# Patient Record
Sex: FEMALE | Race: WHITE | Employment: OTHER | URBAN - METROPOLITAN AREA
[De-identification: names, ages, dates, MRNs, and addresses within clinical notes are randomized per-mention and may not be internally consistent; named-entity substitution may affect disease eponyms.]

---

## 2019-07-29 ENCOUNTER — HOSPITAL ENCOUNTER (EMERGENCY)
Age: 79
Discharge: HOME | End: 2019-07-29
Payer: MEDICARE

## 2019-07-29 ENCOUNTER — APPOINTMENT (OUTPATIENT)
Dept: GENERAL RADIOLOGY | Age: 79
End: 2019-07-29
Payer: MEDICARE

## 2019-07-29 VITALS
SYSTOLIC BLOOD PRESSURE: 118 MMHG | BODY MASS INDEX: 25.19 KG/M2 | RESPIRATION RATE: 16 BRPM | HEART RATE: 114 BPM | OXYGEN SATURATION: 94 % | TEMPERATURE: 98.8 F | WEIGHT: 120 LBS | HEIGHT: 58 IN | DIASTOLIC BLOOD PRESSURE: 68 MMHG

## 2019-07-29 DIAGNOSIS — S80.02XA CONTUSION OF LEFT KNEE, INITIAL ENCOUNTER: Primary | ICD-10-CM

## 2019-07-29 PROCEDURE — 99283 EMERGENCY DEPT VISIT LOW MDM: CPT

## 2019-07-29 PROCEDURE — 73564 X-RAY EXAM KNEE 4 OR MORE: CPT

## 2019-07-29 RX ORDER — FERROUS SULFATE 325(65) MG
325 TABLET ORAL
COMMUNITY

## 2019-07-29 ASSESSMENT — PAIN SCALES - GENERAL: PAINLEVEL_OUTOF10: 5

## 2019-07-29 ASSESSMENT — PAIN DESCRIPTION - ORIENTATION: ORIENTATION: LEFT

## 2019-07-29 ASSESSMENT — PAIN DESCRIPTION - LOCATION: LOCATION: KNEE

## 2019-07-29 NOTE — ED PROVIDER NOTES
[facility]    HPI:  7/29/19,   Time: 4:17 PM         Letty Jauregui is a 66 y.o. female presenting to the ED from home and complains of continued pain swelling and bruising to her left knee that began two days ago after falling. The complaint has been persistent, moderate in severity, and worsened by walking. She denies any other injury. ROS:   Pertinent positives and negatives are stated within HPI, all other systems reviewed and are negative.  --------------------------------------------- PAST HISTORY ---------------------------------------------  Past Medical History:  has a past medical history of Atrial fibrillation ([facility] [address] Past Surgical History:  has a past surgical history that includes Upper gastrointestinal endoscopy (07/25/2017). Social History:  reports that she has never smoked. She has never used smokeless tobacco. She reports that she drinks alcohol. She reports that she does not use drugs. Family History: family history is not on file. The patients home medications have been reviewed. Allergies: Dye [iodides] and Aleve [naproxen]    -------------------------------------------------- RESULTS -------------------------------------------------  All laboratory and radiology results have been personally reviewed by myself   LABS:  No results found for this visit on 07/29/19. RADIOLOGY:  Interpreted by Radiologist.  XR KNEE LEFT (MIN 4 VIEWS)   Final Result   Findings more of soft tissue contusion in the anterior aspect of the   knee extending medially and laterally. Degenerative changes seen moderate degree in the left knee joint. No acute fractures or dislocations identified.          ------------------------- NURSING NOTES AND VITALS REVIEWED ---------------------------   The nursing notes within the ED encounter and vital signs as below have been reviewed.    /68   Pulse 114 Comment: pt states her heart rate is always like this and her doctors are aware

## 2019-07-29 NOTE — ED NOTES
Ace applied to left knee. Tolerated well. States feeling better. Pulses intact after wrap placed.      True Rangel RN  07/29/19 9280

## 2020-12-15 PROBLEM — J84.10 PULMONARY FIBROSIS (HCC): Status: ACTIVE | Noted: 2020-12-15

## 2020-12-15 PROBLEM — R06.09 DYSPNEA ON EXERTION: Status: ACTIVE | Noted: 2020-12-15

## 2021-01-27 ENCOUNTER — IMMUNIZATION (OUTPATIENT)
Dept: PRIMARY CARE CLINIC | Age: 81
End: 2021-01-27
Payer: MEDICARE

## 2021-01-27 PROCEDURE — 0011A COVID-19, MODERNA VACCINE 100MCG/0.5ML DOSE: CPT | Performed by: NURSE PRACTITIONER

## 2021-01-27 PROCEDURE — 91301 COVID-19, MODERNA VACCINE 100MCG/0.5ML DOSE: CPT | Performed by: NURSE PRACTITIONER

## 2021-02-24 ENCOUNTER — IMMUNIZATION (OUTPATIENT)
Dept: PRIMARY CARE CLINIC | Age: 81
End: 2021-02-24
Payer: MEDICARE

## 2021-02-24 PROCEDURE — 91301 COVID-19, MODERNA VACCINE 100MCG/0.5ML DOSE: CPT | Performed by: NURSE PRACTITIONER

## 2021-02-24 PROCEDURE — 0012A COVID-19, MODERNA VACCINE 100MCG/0.5ML DOSE: CPT | Performed by: NURSE PRACTITIONER

## 2021-03-15 PROBLEM — J43.9 PULMONARY EMPHYSEMA (HCC): Status: ACTIVE | Noted: 2021-03-15

## 2021-06-21 ENCOUNTER — APPOINTMENT (OUTPATIENT)
Dept: GENERAL RADIOLOGY | Age: 81
DRG: 292 | End: 2021-06-21
Payer: MEDICARE

## 2021-06-21 ENCOUNTER — HOSPITAL ENCOUNTER (INPATIENT)
Age: 81
LOS: 1 days | Discharge: HOME | DRG: 292 | End: 2021-06-24
Attending: EMERGENCY MEDICINE | Admitting: FAMILY MEDICINE
Payer: MEDICARE

## 2021-06-21 DIAGNOSIS — I50.9 CONGESTIVE HEART FAILURE, UNSPECIFIED HF CHRONICITY, UNSPECIFIED HEART FAILURE TYPE (HCC): ICD-10-CM

## 2021-06-21 DIAGNOSIS — R06.00 DYSPNEA, UNSPECIFIED TYPE: ICD-10-CM

## 2021-06-21 DIAGNOSIS — R07.9 CHEST PAIN, UNSPECIFIED TYPE: Primary | ICD-10-CM

## 2021-06-21 LAB
ALBUMIN SERPL-MCNC: 4.3 G/DL (ref 3.5–5.2)
ALP BLD-CCNC: 84 U/L (ref 35–104)
ALT SERPL-CCNC: 10 U/L (ref 0–32)
ANION GAP SERPL CALCULATED.3IONS-SCNC: 9 MMOL/L (ref 7–16)
APTT: 37.6 SEC (ref 24.5–35.1)
AST SERPL-CCNC: 28 U/L (ref 0–31)
BASOPHILS ABSOLUTE: 0.03 E9/L (ref 0–0.2)
BASOPHILS RELATIVE PERCENT: 0.6 % (ref 0–2)
BILIRUB SERPL-MCNC: 0.8 MG/DL (ref 0–1.2)
BUN BLDV-MCNC: 16 MG/DL (ref 6–23)
CALCIUM SERPL-MCNC: 9.9 MG/DL (ref 8.6–10.2)
CHLORIDE BLD-SCNC: 99 MMOL/L (ref 98–107)
CO2: 29 MMOL/L (ref 22–29)
CREAT SERPL-MCNC: 1.1 MG/DL (ref 0.5–1)
EOSINOPHILS ABSOLUTE: 0.14 E9/L (ref 0.05–0.5)
EOSINOPHILS RELATIVE PERCENT: 2.7 % (ref 0–6)
GFR AFRICAN AMERICAN: 58
GFR NON-AFRICAN AMERICAN: 48 ML/MIN/1.73
GLUCOSE BLD-MCNC: 96 MG/DL (ref 74–99)
HCT VFR BLD CALC: 45.8 % (ref 34–48)
HEMOGLOBIN: 14.4 G/DL (ref 11.5–15.5)
IMMATURE GRANULOCYTES #: 0.01 E9/L
IMMATURE GRANULOCYTES %: 0.2 % (ref 0–5)
INR BLD: 1.2
LYMPHOCYTES ABSOLUTE: 1.19 E9/L (ref 1.5–4)
LYMPHOCYTES RELATIVE PERCENT: 23.2 % (ref 20–42)
MCH RBC QN AUTO: 29 PG (ref 26–35)
MCHC RBC AUTO-ENTMCNC: 31.4 % (ref 32–34.5)
MCV RBC AUTO: 92.3 FL (ref 80–99.9)
MONOCYTES ABSOLUTE: 0.42 E9/L (ref 0.1–0.95)
MONOCYTES RELATIVE PERCENT: 8.2 % (ref 2–12)
NEUTROPHILS ABSOLUTE: 3.33 E9/L (ref 1.8–7.3)
NEUTROPHILS RELATIVE PERCENT: 65.1 % (ref 43–80)
PDW BLD-RTO: 13.4 FL (ref 11.5–15)
PLATELET # BLD: 167 E9/L (ref 130–450)
PMV BLD AUTO: 10.5 FL (ref 7–12)
POTASSIUM REFLEX MAGNESIUM: 4.7 MMOL/L (ref 3.5–5)
PRO-BNP: 1945 PG/ML (ref 0–450)
PROTHROMBIN TIME: 13.4 SEC (ref 9.3–12.4)
RBC # BLD: 4.96 E12/L (ref 3.5–5.5)
SODIUM BLD-SCNC: 137 MMOL/L (ref 132–146)
TOTAL PROTEIN: 7.3 G/DL (ref 6.4–8.3)
TROPONIN, HIGH SENSITIVITY: 19 NG/L (ref 0–9)
TROPONIN, HIGH SENSITIVITY: 20 NG/L (ref 0–9)
WBC # BLD: 5.1 E9/L (ref 4.5–11.5)

## 2021-06-21 PROCEDURE — 83880 ASSAY OF NATRIURETIC PEPTIDE: CPT

## 2021-06-21 PROCEDURE — 85610 PROTHROMBIN TIME: CPT

## 2021-06-21 PROCEDURE — G0378 HOSPITAL OBSERVATION PER HR: HCPCS

## 2021-06-21 PROCEDURE — 85025 COMPLETE CBC W/AUTO DIFF WBC: CPT

## 2021-06-21 PROCEDURE — 96374 THER/PROPH/DIAG INJ IV PUSH: CPT

## 2021-06-21 PROCEDURE — 80053 COMPREHEN METABOLIC PANEL: CPT

## 2021-06-21 PROCEDURE — 96376 TX/PRO/DX INJ SAME DRUG ADON: CPT

## 2021-06-21 PROCEDURE — 71045 X-RAY EXAM CHEST 1 VIEW: CPT

## 2021-06-21 PROCEDURE — 6360000002 HC RX W HCPCS: Performed by: FAMILY MEDICINE

## 2021-06-21 PROCEDURE — 93005 ELECTROCARDIOGRAM TRACING: CPT | Performed by: NURSE PRACTITIONER

## 2021-06-21 PROCEDURE — 84484 ASSAY OF TROPONIN QUANT: CPT

## 2021-06-21 PROCEDURE — 85730 THROMBOPLASTIN TIME PARTIAL: CPT

## 2021-06-21 PROCEDURE — 99283 EMERGENCY DEPT VISIT LOW MDM: CPT

## 2021-06-21 PROCEDURE — 6360000002 HC RX W HCPCS: Performed by: EMERGENCY MEDICINE

## 2021-06-21 RX ORDER — FUROSEMIDE 10 MG/ML
20 INJECTION INTRAMUSCULAR; INTRAVENOUS ONCE
Status: COMPLETED | OUTPATIENT
Start: 2021-06-21 | End: 2021-06-21

## 2021-06-21 RX ORDER — FUROSEMIDE 10 MG/ML
40 INJECTION INTRAMUSCULAR; INTRAVENOUS DAILY
Status: DISCONTINUED | OUTPATIENT
Start: 2021-06-21 | End: 2021-06-22

## 2021-06-21 RX ORDER — ASPIRIN 81 MG/1
324 TABLET, CHEWABLE ORAL ONCE
Status: DISCONTINUED | OUTPATIENT
Start: 2021-06-21 | End: 2021-06-21

## 2021-06-21 RX ADMIN — FUROSEMIDE 40 MG: 10 INJECTION, SOLUTION INTRAMUSCULAR; INTRAVENOUS at 22:33

## 2021-06-21 RX ADMIN — FUROSEMIDE 20 MG: 10 INJECTION, SOLUTION INTRAVENOUS at 16:34

## 2021-06-21 ASSESSMENT — PAIN SCALES - GENERAL: PAINLEVEL_OUTOF10: 0

## 2021-06-21 NOTE — ED NOTES
FIRST PROVIDER CONTACT ASSESSMENT NOTE        Department of Emergency Medicine            ED  First Provider Note            6/21/21  11:18 AM EDT    Chief Complaint: No chief complaint on file. History of Present Illness:    Ally Ferraro is a [de-identified] y.o. female who presents to the emergency department of chest pain and shortness of breath. Onset of symptoms at 9 am while in physical therapy. Focused Screening Exam:  Constitutional:  Alert, appears stated age and is in no distress.     *ALLERGIES*     Dye [iodides] and Aleve [naproxen]     ED Triage Vitals [06/21/21 1114]   BP Temp Temp src Pulse Resp SpO2 Height Weight   -- 97.1 °F (36.2 °C) -- -- -- -- -- --        Initial Plan of Care:  Initiate Treatment-Testing, Proceed toTreatment Area When Bed Available for ED Attending/MLP to Continue Care    -----------------END OF FIRST PROVIDER CONTACT ASSESSMENT NOTE--------------  Electronically signed by MARKOS Santillan CNP   DD: 6/21/21     MARKOS Santillan CNP  06/21/21 1119

## 2021-06-21 NOTE — ED PROVIDER NOTES
HPI:  6/21/21,   Time: 4:01 PM EDT       Ally Ferraro is a [de-identified] y.o. female presenting to the ED for sob/nausea, beginning 6 hrs ago. The complaint has been intermittent, moderate in severity, and worsened by moderate exertion. At therayp today, cp and dominguez, better with rest.  Some nausea, no emesis. Hx dyspnea in past, but this is different. Pain pressure like. No radiation. No cough/congestion/fever/chills/sweats. Bib private vehicle    Review of Systems:   Pertinent positives and negatives are stated within HPI, all other systems reviewed and are negative.          --------------------------------------------- PAST HISTORY ---------------------------------------------  Past Medical History:  has a past medical history of Atrial fibrillation ([facility] [address] Past Surgical History:  has a past surgical history that includes Upper gastrointestinal endoscopy (07/25/2017). Social History:  reports that she quit smoking about 38 years ago. Her smoking use included cigarettes. She started smoking about 59 years ago. She has a 30.00 pack-year smoking history. She has never used smokeless tobacco. She reports current alcohol use. She reports that she does not use drugs. Family History: family history is not on file. The patients home medications have been reviewed. Allergies: Dye [iodides] and Aleve [naproxen]        ---------------------------------------------------PHYSICAL EXAM--------------------------------------    Constitutional/General: Alert and oriented x3, well appearing, non toxic in NAD  Head: Normocephalic and atraumatic  Eyes: PERRL, EOMI, conjunctive normal, sclera non icteric  Mouth: Oropharynx clear, handling secretions, no trismus, no asymmetry of the posterior oropharynx or uvular edema  Neck: Supple, full ROM, non tender to palpation in the midline, no stridor, no crepitus, no meningeal signs  Respiratory: Lungs clear to auscultation bilaterally, no wheezes, rales, or rhonchi.  Not in respiratory distress  Cardiovascular:  Regular rate. Regular rhythm. No murmurs, gallops, or rubs. 2+ distal pulses  Chest: No chest wall tenderness  GI:  Abdomen Soft, Non tender, Non distended. +BS. No organomegaly, no palpable masses,  No rebound, guarding, or rigidity. Musculoskeletal: Moves all extremities x 4. Warm and well perfused, no clubbing, cyanosis, or edema. Capillary refill <3 seconds  Integument: skin warm and dry. No rashes. Lymphatic: no lymphadenopathy noted  Neurologic: GCS 15, no focal deficits, symmetric strength 5/5 in the upper and lower extremities bilaterally  Psychiatric: Normal Affect    -------------------------------------------------- RESULTS -------------------------------------------------  I have personally reviewed all laboratory and imaging results for this patient. Results are listed below.      LABS:  Results for orders placed or performed during the hospital encounter of 06/21/21   CBC Auto Differential   Result Value Ref Range    WBC 5.1 4.5 - 11.5 E9/L    RBC 4.96 3.50 - 5.50 E12/L    Hemoglobin 14.4 11.5 - 15.5 g/dL    Hematocrit 45.8 34.0 - 48.0 %    MCV 92.3 80.0 - 99.9 fL    MCH 29.0 26.0 - 35.0 pg    MCHC 31.4 (L) 32.0 - 34.5 %    RDW 13.4 11.5 - 15.0 fL    Platelets 702 230 - 855 E9/L    MPV 10.5 7.0 - 12.0 fL    Neutrophils % 65.1 43.0 - 80.0 %    Immature Granulocytes % 0.2 0.0 - 5.0 %    Lymphocytes % 23.2 20.0 - 42.0 %    Monocytes % 8.2 2.0 - 12.0 %    Eosinophils % 2.7 0.0 - 6.0 %    Basophils % 0.6 0.0 - 2.0 %    Neutrophils Absolute 3.33 1.80 - 7.30 E9/L    Immature Granulocytes # 0.01 E9/L    Lymphocytes Absolute 1.19 (L) 1.50 - 4.00 E9/L    Monocytes Absolute 0.42 0.10 - 0.95 E9/L    Eosinophils Absolute 0.14 0.05 - 0.50 E9/L    Basophils Absolute 0.03 0.00 - 0.20 E9/L   Comprehensive Metabolic Panel w/ Reflex to MG   Result Value Ref Range    Sodium 137 132 - 146 mmol/L    Potassium reflex Magnesium 4.7 3.5 - 5.0 mmol/L    Chloride 99 98 - 107 mmol/L CO2 29 22 - 29 mmol/L    Anion Gap 9 7 - 16 mmol/L    Glucose 96 74 - 99 mg/dL    BUN 16 6 - 23 mg/dL    CREATININE 1.1 (H) 0.5 - 1.0 mg/dL    GFR Non-African American 48 >=60 mL/min/1.73    GFR African American 58     Calcium 9.9 8.6 - 10.2 mg/dL    Total Protein 7.3 6.4 - 8.3 g/dL    Albumin 4.3 3.5 - 5.2 g/dL    Total Bilirubin 0.8 0.0 - 1.2 mg/dL    Alkaline Phosphatase 84 35 - 104 U/L    ALT 10 0 - 32 U/L    AST 28 0 - 31 U/L   Troponin   Result Value Ref Range    Troponin, High Sensitivity 20 (H) 0 - 9 ng/L   Brain Natriuretic Peptide   Result Value Ref Range    Pro-BNP 1,945 (H) 0 - 450 pg/mL   APTT   Result Value Ref Range    aPTT 37.6 (H) 24.5 - 35.1 sec   Protime-INR   Result Value Ref Range    Protime 13.4 (H) 9.3 - 12.4 sec    INR 1.2    Troponin   Result Value Ref Range    Troponin, High Sensitivity 19 (H) 0 - 9 ng/L   EKG 12 Lead   Result Value Ref Range    Ventricular Rate 93 BPM    Atrial Rate 241 BPM    QRS Duration 68 ms    Q-T Interval 354 ms    QTc Calculation (Bazett) 440 ms    R Axis 111 degrees    T Axis 71 degrees       RADIOLOGY:  Interpreted by Radiologist.  XR CHEST PORTABLE   Final Result   No acute process. Stable cardiomegaly. Lobe atelectasis. EKG:  This EKG is signed and interpreted by the EP. Time: 1253  Rate: 90  Rhythm: Atrial fibrillation  Interpretation: atrial fibrillation (chronic)  Comparison: None      ------------------------- NURSING NOTES AND VITALS REVIEWED ---------------------------   The nursing notes within the ED encounter and vital signs as below have been reviewed by myself. /73   Pulse 103   Temp 97.1 °F (36.2 °C)   Resp 18   Ht 4' 10\" (1.473 m)   Wt 118 lb (53.5 kg)   SpO2 95%   BMI 24.66 kg/m²   Oxygen Saturation Interpretation: Normal    The patients available past medical records and past encounters were reviewed.         ------------------------------ ED COURSE/MEDICAL DECISION MAKING----------------------  Medications furosemide (LASIX) injection 20 mg (20 mg Intravenous Given 6/21/21 7480)         ED COURSE:       Medical Decision Making:    Vss, w/u noted, elev bnp, trop x 2 no delta, but with age and elev bnp, obs for further care. This patient's ED course included: a personal history and physicial examination    This patient has remained hemodynamically stable during their ED course. Re-Evaluations:             Re-evaluation. Patients symptoms show no change            Consultations:             Kingsley Roland    Critical Care:         Counseling: The emergency provider has spoken with the patient and discussed todays results, in addition to providing specific details for the plan of care and counseling regarding the diagnosis and prognosis. Questions are answered at this time and they are agreeable with the plan.       --------------------------------- IMPRESSION AND DISPOSITION ---------------------------------    IMPRESSION  1. Chest pain, unspecified type    2. Dyspnea, unspecified type    3. Congestive heart failure, unspecified HF chronicity, unspecified [facility] ([facility]        DISPOSITION  Disposition: Admit to telemetry  Patient condition is stable    NOTE: This report was transcribed using voice recognition software.  Every effort was made to ensure accuracy; however, inadvertent computerized transcription errors may be present        Harlan Rivera MD  06/21/21 7409

## 2021-06-22 PROBLEM — I48.91 ATRIAL FIBRILLATION WITH RAPID VENTRICULAR RESPONSE (HCC): Status: ACTIVE | Noted: 2021-06-22

## 2021-06-22 LAB
ANION GAP SERPL CALCULATED.3IONS-SCNC: 9 MMOL/L (ref 7–16)
BUN BLDV-MCNC: 18 MG/DL (ref 6–23)
CALCIUM SERPL-MCNC: 9.5 MG/DL (ref 8.6–10.2)
CHLORIDE BLD-SCNC: 99 MMOL/L (ref 98–107)
CO2: 31 MMOL/L (ref 22–29)
CREAT SERPL-MCNC: 1.2 MG/DL (ref 0.5–1)
EKG ATRIAL RATE: 241 BPM
EKG Q-T INTERVAL: 354 MS
EKG QRS DURATION: 68 MS
EKG QTC CALCULATION (BAZETT): 440 MS
EKG R AXIS: 111 DEGREES
EKG T AXIS: 71 DEGREES
EKG VENTRICULAR RATE: 93 BPM
GFR AFRICAN AMERICAN: 52
GFR NON-AFRICAN AMERICAN: 43 ML/MIN/1.73
GLUCOSE BLD-MCNC: 94 MG/DL (ref 74–99)
HCT VFR BLD CALC: 40.9 % (ref 34–48)
HEMOGLOBIN: 13 G/DL (ref 11.5–15.5)
MCH RBC QN AUTO: 29.1 PG (ref 26–35)
MCHC RBC AUTO-ENTMCNC: 31.8 % (ref 32–34.5)
MCV RBC AUTO: 91.7 FL (ref 80–99.9)
PDW BLD-RTO: 13.4 FL (ref 11.5–15)
PLATELET # BLD: 170 E9/L (ref 130–450)
PMV BLD AUTO: 10.6 FL (ref 7–12)
POTASSIUM SERPL-SCNC: 3.8 MMOL/L (ref 3.5–5)
RBC # BLD: 4.46 E12/L (ref 3.5–5.5)
SODIUM BLD-SCNC: 139 MMOL/L (ref 132–146)
WBC # BLD: 4.8 E9/L (ref 4.5–11.5)

## 2021-06-22 PROCEDURE — 85027 COMPLETE CBC AUTOMATED: CPT

## 2021-06-22 PROCEDURE — 6370000000 HC RX 637 (ALT 250 FOR IP): Performed by: FAMILY MEDICINE

## 2021-06-22 PROCEDURE — 96376 TX/PRO/DX INJ SAME DRUG ADON: CPT

## 2021-06-22 PROCEDURE — G0378 HOSPITAL OBSERVATION PER HR: HCPCS

## 2021-06-22 PROCEDURE — 36415 COLL VENOUS BLD VENIPUNCTURE: CPT

## 2021-06-22 PROCEDURE — 6360000002 HC RX W HCPCS: Performed by: FAMILY MEDICINE

## 2021-06-22 PROCEDURE — 93010 ELECTROCARDIOGRAM REPORT: CPT | Performed by: INTERNAL MEDICINE

## 2021-06-22 PROCEDURE — 80048 BASIC METABOLIC PNL TOTAL CA: CPT

## 2021-06-22 RX ORDER — LISINOPRIL 20 MG/1
40 TABLET ORAL DAILY
Status: DISCONTINUED | OUTPATIENT
Start: 2021-06-22 | End: 2021-06-22

## 2021-06-22 RX ORDER — ATORVASTATIN CALCIUM 20 MG/1
20 TABLET, FILM COATED ORAL DAILY
Status: DISCONTINUED | OUTPATIENT
Start: 2021-06-22 | End: 2021-06-25 | Stop reason: HOSPADM

## 2021-06-22 RX ORDER — DILTIAZEM HYDROCHLORIDE 120 MG/1
120 CAPSULE, COATED, EXTENDED RELEASE ORAL DAILY
Status: DISCONTINUED | OUTPATIENT
Start: 2021-06-23 | End: 2021-06-25 | Stop reason: HOSPADM

## 2021-06-22 RX ORDER — AMLODIPINE BESYLATE AND BENAZEPRIL HYDROCHLORIDE 5; 40 MG/1; MG/1
1 CAPSULE ORAL DAILY
Status: DISCONTINUED | OUTPATIENT
Start: 2021-06-22 | End: 2021-06-22 | Stop reason: CLARIF

## 2021-06-22 RX ORDER — NADOLOL 20 MG/1
5 TABLET ORAL DAILY
Status: DISCONTINUED | OUTPATIENT
Start: 2021-06-22 | End: 2021-06-25 | Stop reason: HOSPADM

## 2021-06-22 RX ORDER — ALBUTEROL SULFATE 2.5 MG/3ML
2.5 SOLUTION RESPIRATORY (INHALATION) EVERY 6 HOURS PRN
Status: DISCONTINUED | OUTPATIENT
Start: 2021-06-22 | End: 2021-06-25 | Stop reason: HOSPADM

## 2021-06-22 RX ORDER — AMLODIPINE BESYLATE 5 MG/1
5 TABLET ORAL DAILY
Status: DISCONTINUED | OUTPATIENT
Start: 2021-06-22 | End: 2021-06-22

## 2021-06-22 RX ORDER — LISINOPRIL 20 MG/1
20 TABLET ORAL DAILY
Status: DISCONTINUED | OUTPATIENT
Start: 2021-06-23 | End: 2021-06-24

## 2021-06-22 RX ORDER — POTASSIUM CHLORIDE 20 MEQ/1
20 TABLET, EXTENDED RELEASE ORAL DAILY
Status: DISCONTINUED | OUTPATIENT
Start: 2021-06-22 | End: 2021-06-25 | Stop reason: HOSPADM

## 2021-06-22 RX ORDER — ALENDRONATE SODIUM 70 MG/1
70 TABLET ORAL
Status: DISCONTINUED | OUTPATIENT
Start: 2021-06-22 | End: 2021-06-22 | Stop reason: CLARIF

## 2021-06-22 RX ADMIN — FUROSEMIDE 40 MG: 10 INJECTION, SOLUTION INTRAMUSCULAR; INTRAVENOUS at 08:14

## 2021-06-22 RX ADMIN — APIXABAN 2.5 MG: 5 TABLET, FILM COATED ORAL at 20:16

## 2021-06-22 ASSESSMENT — PAIN SCALES - GENERAL: PAINLEVEL_OUTOF10: 0

## 2021-06-22 ASSESSMENT — ENCOUNTER SYMPTOMS
SHORTNESS OF BREATH: 1
ABDOMINAL PAIN: 0

## 2021-06-22 NOTE — H&P
HISTORY AND PHYSICAL             Date: 6/22/2021        Patient Name: Bill Oneil     YOB: 1940      Age:  [de-identified] y.o. Chief Complaint     Chief Complaint   Patient presents with    Shortness of Breath     starting during physical therapy. hx afib, on eliquis        History Obtained From   patient    History of Present Illness   Patient was at PT and says she became short of breath and dizzy. Past Medical History     Past Medical History:   Diagnosis Date    Atrial fibrillation [facility])         Past Surgical History     Past Surgical History:   Procedure Laterality Date    UPPER GASTROINTESTINAL ENDOSCOPY  07/25/2017        Medications Prior to Admission     Prior to Admission medications    Medication Sig Start Date End Date Taking?  Authorizing Provider   albuterol sulfate HFA (PROVENTIL HFA) 108 (90 Base) MCG/ACT inhaler Inhale 2 puffs into the lungs every 6 hours as needed for Wheezing 12/15/20  Yes Leanne Bamberger, MD   ferrous sulfate 325 (65 Fe) MG tablet Take 325 mg by mouth daily (with breakfast)   Yes Historical Provider, MD   NADOLOL PO Take 5 mg by mouth daily    Yes Historical Provider, MD   potassium chloride (KLOR-CON M) 20 MEQ extended release tablet Take 20 mEq by mouth daily   Yes Historical Provider, MD   alendronate (FOSAMAX) 70 MG tablet Take 70 mg by mouth every 7 days Sundays   Yes Historical Provider, MD   amLODIPine-benazepril (LOTREL) 5-40 MG per capsule Take 1 capsule by mouth daily   Yes Historical Provider, MD   furosemide (LASIX) 20 MG tablet Take 20 mg by mouth daily   Yes Historical Provider, MD   simvastatin (ZOCOR) 20 MG tablet Take 20 mg by mouth nightly   Yes Historical Provider, MD   apixaban (ELIQUIS) 5 MG TABS tablet Take 2.5 mg by mouth 2 times daily    Yes Historical Provider, MD   Tiotropium Bromide-Olodaterol (STIOLTO RESPIMAT) 2.5-2.5 MCG/ACT AERS Inhale 2 actuation into the lungs daily 3/15/21   Leanne Bamberger, MD        Allergies   Dye [iodides] and Aleve [naproxen]    Social History     Social History     Tobacco History     Smoking Status  Former Smoker Smoking Start Date  12/15/1961 Quit date  12/15/1982 Smoking Frequency  1.5 packs/day for 20 years (30 pk yrs)    Smoking Tobacco Type  Cigarettes    Smokeless Tobacco Use  Never Used          Alcohol History     Alcohol Use Status  Yes Comment  SOCIALLY          Drug Use     Drug Use Status  No          Sexual Activity     Sexually Active  Not Currently                Family History   History reviewed. No pertinent family history. Review of Systems   Review of Systems   Constitutional: Positive for activity change. HENT: Negative for congestion. Respiratory: Positive for shortness of breath. Cardiovascular: Negative for chest pain. Gastrointestinal: Negative for abdominal pain. Genitourinary: Negative for difficulty urinating. Neurological: Positive for light-headedness. Negative for syncope and speech difficulty. Psychiatric/Behavioral: Negative for agitation. Physical Exam   BP 99/71   Pulse 87   Temp 97.4 °F (36.3 °C) (Temporal)   Resp 18   Ht 4' 10\" (1.473 m)   Wt 118 lb (53.5 kg)   SpO2 95%   BMI 24.66 kg/m²     Physical Exam  Vitals reviewed. HENT:      Head: Normocephalic. Mouth/Throat:      Mouth: Mucous membranes are moist.   Eyes:      Pupils: Pupils are equal, round, and reactive to light. Cardiovascular:      Rate and Rhythm: Normal rate and regular rhythm. Pulses: Normal pulses. Heart sounds: Normal heart sounds. Pulmonary:      Effort: Pulmonary effort is normal. No respiratory distress. Breath sounds: Normal breath sounds. No wheezing. Abdominal:      General: Abdomen is flat. Bowel sounds are normal. There is no distension. Tenderness: There is no abdominal tenderness. Skin:     General: Skin is warm and dry. Capillary Refill: Capillary refill takes less than 2 seconds.    Neurological:      General: No focal deficit present. Mental Status: She is alert and oriented to person, place, and time.    Psychiatric:         Mood and Affect: Mood normal.         Behavior: Behavior normal.         Labs      Recent Results (from the past 24 hour(s))   EKG 12 Lead    Collection Time: 06/21/21 12:53 PM   Result Value Ref Range    Ventricular Rate 93 BPM    Atrial Rate 241 BPM    QRS Duration 68 ms    Q-T Interval 354 ms    QTc Calculation (Bazett) 440 ms    R Axis 111 degrees    T Axis 71 degrees   CBC Auto Differential    Collection Time: 06/21/21 12:55 PM   Result Value Ref Range    WBC 5.1 4.5 - 11.5 E9/L    RBC 4.96 3.50 - 5.50 E12/L    Hemoglobin 14.4 11.5 - 15.5 g/dL    Hematocrit 45.8 34.0 - 48.0 %    MCV 92.3 80.0 - 99.9 fL    MCH 29.0 26.0 - 35.0 pg    MCHC 31.4 (L) 32.0 - 34.5 %    RDW 13.4 11.5 - 15.0 fL    Platelets 836 486 - 072 E9/L    MPV 10.5 7.0 - 12.0 fL    Neutrophils % 65.1 43.0 - 80.0 %    Immature Granulocytes % 0.2 0.0 - 5.0 %    Lymphocytes % 23.2 20.0 - 42.0 %    Monocytes % 8.2 2.0 - 12.0 %    Eosinophils % 2.7 0.0 - 6.0 %    Basophils % 0.6 0.0 - 2.0 %    Neutrophils Absolute 3.33 1.80 - 7.30 E9/L    Immature Granulocytes # 0.01 E9/L    Lymphocytes Absolute 1.19 (L) 1.50 - 4.00 E9/L    Monocytes Absolute 0.42 0.10 - 0.95 E9/L    Eosinophils Absolute 0.14 0.05 - 0.50 E9/L    Basophils Absolute 0.03 0.00 - 0.20 E9/L   Comprehensive Metabolic Panel w/ Reflex to MG    Collection Time: 06/21/21 12:55 PM   Result Value Ref Range    Sodium 137 132 - 146 mmol/L    Potassium reflex Magnesium 4.7 3.5 - 5.0 mmol/L    Chloride 99 98 - 107 mmol/L    CO2 29 22 - 29 mmol/L    Anion Gap 9 7 - 16 mmol/L    Glucose 96 74 - 99 mg/dL    BUN 16 6 - 23 mg/dL    CREATININE 1.1 (H) 0.5 - 1.0 mg/dL    GFR Non-African American 48 >=60 mL/min/1.73    GFR African American 58     Calcium 9.9 8.6 - 10.2 mg/dL    Total Protein 7.3 6.4 - 8.3 g/dL    Albumin 4.3 3.5 - 5.2 g/dL    Total Bilirubin 0.8 0.0 - 1.2 mg/dL    Alkaline Phosphatase 84 35 - 104 U/L    ALT 10 0 - 32 U/L    AST 28 0 - 31 U/L   Troponin    Collection Time: 06/21/21 12:55 PM   Result Value Ref Range    Troponin, High Sensitivity 20 (H) 0 - 9 ng/L   Brain Natriuretic Peptide    Collection Time: 06/21/21 12:55 PM   Result Value Ref Range    Pro-BNP 1,945 (H) 0 - 450 pg/mL   APTT    Collection Time: 06/21/21 12:55 PM   Result Value Ref Range    aPTT 37.6 (H) 24.5 - 35.1 sec   Protime-INR    Collection Time: 06/21/21 12:55 PM   Result Value Ref Range    Protime 13.4 (H) 9.3 - 12.4 sec    INR 1.2    Troponin    Collection Time: 06/21/21  3:56 PM   Result Value Ref Range    Troponin, High Sensitivity 19 (H) 0 - 9 ng/L   CBC    Collection Time: 06/22/21  3:50 AM   Result Value Ref Range    WBC 4.8 4.5 - 11.5 E9/L    RBC 4.46 3.50 - 5.50 E12/L    Hemoglobin 13.0 11.5 - 15.5 g/dL    Hematocrit 40.9 34.0 - 48.0 %    MCV 91.7 80.0 - 99.9 fL    MCH 29.1 26.0 - 35.0 pg    MCHC 31.8 (L) 32.0 - 34.5 %    RDW 13.4 11.5 - 15.0 fL    Platelets 992 784 - 927 E9/L    MPV 10.6 7.0 - 12.0 fL   Basic metabolic panel    Collection Time: 06/22/21  3:50 AM   Result Value Ref Range    Sodium 139 132 - 146 mmol/L    Potassium 3.8 3.5 - 5.0 mmol/L    Chloride 99 98 - 107 mmol/L    CO2 31 (H) 22 - 29 mmol/L    Anion Gap 9 7 - 16 mmol/L    Glucose 94 74 - 99 mg/dL    BUN 18 6 - 23 mg/dL    CREATININE 1.2 (H) 0.5 - 1.0 mg/dL    GFR Non-African American 43 >=60 mL/min/1.73    GFR African American 52     Calcium 9.5 8.6 - 10.2 mg/dL        Imaging/Diagnostics Last 24 Hours   XR CHEST PORTABLE    Result Date: 6/21/2021  EXAMINATION: ONE XRAY VIEW OF THE CHEST 6/21/2021 12:28 pm COMPARISON: 07/25/2017 HISTORY: ORDERING SYSTEM PROVIDED HISTORY: Shortness of breath TECHNOLOGIST PROVIDED HISTORY: Reason for exam:->Shortness of breath What reading provider will be dictating this exam?->CRC FINDINGS: The lungs are without acute focal process. Right lower lobe atelectasis. There is no effusion or pneumothorax.   Stable cardiomegaly. The osseous structures are without acute process. No acute process. Stable cardiomegaly. Lobe atelectasis. Assessment      Hospital Problems         Last Modified POA    Dyspnea 6/21/2021 Yes      Dizziness  Afib    Plan   Feels better after IV lasix, although CXR and labs are stable. Cardiology to evaluate. Continue home meds.     Consultations Ordered:  IP CONSULT TO INTERNAL MEDICINE  IP CONSULT TO CARDIOLOGY    Electronically signed by Sona Orosco MD on 6/22/21 at 7:00 AM EDT

## 2021-06-22 NOTE — PROGRESS NOTES
[facility] cardiology progress note:    Patient was seen and follows with Dr. Jagruti Abad. Nursing staff notified of needed consult change. Please call if you have any questions.    Electronically signed by MARKOS Garcia CNP on 6/22/21 at 9:43 AM EDT

## 2021-06-22 NOTE — PROGRESS NOTES
Home medications reviewed with patient upon admission. perfectserve sent to Dr. Anais Collazo at 2057 regarding orders for home medications. No orders placed at this time.

## 2021-06-22 NOTE — CARE COORDINATION
6/22/21 Transition of Care: Patient is observation due to shortness of breath. She did have iv lasix x1 and says she feels better. She is alert and oriented. She lives alone and is independent. She does not use assistive devices and she does drive. She is currently attending therapy outpatient at [address] [place] [address] due to back pain. She sees Dr James Worthy and her pharmacy is [facility]. She plans on returning home at discharge and states her family wiil provide transportion. Currently she is awaiting cardiology consult completion with Dr Clover Kumar. She is now on iv lasix qd. Creat 1.2 today. Will await cardiology and follow.  Reji Plascencia RN CM

## 2021-06-23 PROBLEM — I50.43 CHF (CONGESTIVE HEART FAILURE), NYHA CLASS I, ACUTE ON CHRONIC, COMBINED (HCC): Status: ACTIVE | Noted: 2021-06-23

## 2021-06-23 LAB
ANION GAP SERPL CALCULATED.3IONS-SCNC: 9 MMOL/L (ref 7–16)
BUN BLDV-MCNC: 26 MG/DL (ref 6–23)
CALCIUM SERPL-MCNC: 9.5 MG/DL (ref 8.6–10.2)
CHLORIDE BLD-SCNC: 100 MMOL/L (ref 98–107)
CO2: 31 MMOL/L (ref 22–29)
CREAT SERPL-MCNC: 1.4 MG/DL (ref 0.5–1)
GFR AFRICAN AMERICAN: 44
GFR NON-AFRICAN AMERICAN: 36 ML/MIN/1.73
GLUCOSE BLD-MCNC: 96 MG/DL (ref 74–99)
HCT VFR BLD CALC: 42.5 % (ref 34–48)
HEMOGLOBIN: 13.3 G/DL (ref 11.5–15.5)
MCH RBC QN AUTO: 28.7 PG (ref 26–35)
MCHC RBC AUTO-ENTMCNC: 31.3 % (ref 32–34.5)
MCV RBC AUTO: 91.6 FL (ref 80–99.9)
PDW BLD-RTO: 13.4 FL (ref 11.5–15)
PLATELET # BLD: 173 E9/L (ref 130–450)
PMV BLD AUTO: 10.8 FL (ref 7–12)
POTASSIUM SERPL-SCNC: 3.9 MMOL/L (ref 3.5–5)
RBC # BLD: 4.64 E12/L (ref 3.5–5.5)
SODIUM BLD-SCNC: 140 MMOL/L (ref 132–146)
WBC # BLD: 4.8 E9/L (ref 4.5–11.5)

## 2021-06-23 PROCEDURE — 6370000000 HC RX 637 (ALT 250 FOR IP): Performed by: INTERNAL MEDICINE

## 2021-06-23 PROCEDURE — 85027 COMPLETE CBC AUTOMATED: CPT

## 2021-06-23 PROCEDURE — 36415 COLL VENOUS BLD VENIPUNCTURE: CPT

## 2021-06-23 PROCEDURE — 80048 BASIC METABOLIC PNL TOTAL CA: CPT

## 2021-06-23 PROCEDURE — 6370000000 HC RX 637 (ALT 250 FOR IP): Performed by: FAMILY MEDICINE

## 2021-06-23 PROCEDURE — 2140000000 HC CCU INTERMEDIATE R&B

## 2021-06-23 RX ADMIN — APIXABAN 2.5 MG: 5 TABLET, FILM COATED ORAL at 09:25

## 2021-06-23 RX ADMIN — POTASSIUM CHLORIDE 20 MEQ: 1500 TABLET, EXTENDED RELEASE ORAL at 09:26

## 2021-06-23 RX ADMIN — NADOLOL 5 MG: 20 TABLET ORAL at 09:29

## 2021-06-23 RX ADMIN — APIXABAN 2.5 MG: 5 TABLET, FILM COATED ORAL at 20:47

## 2021-06-23 RX ADMIN — DILTIAZEM HYDROCHLORIDE 120 MG: 120 CAPSULE, COATED, EXTENDED RELEASE ORAL at 09:26

## 2021-06-23 RX ADMIN — ATORVASTATIN CALCIUM 20 MG: 20 TABLET, FILM COATED ORAL at 09:26

## 2021-06-23 ASSESSMENT — ENCOUNTER SYMPTOMS
SHORTNESS OF BREATH: 1
ABDOMINAL PAIN: 0

## 2021-06-23 ASSESSMENT — PAIN SCALES - GENERAL: PAINLEVEL_OUTOF10: 0

## 2021-06-23 NOTE — PROGRESS NOTES
Progress Note  Date:2021       Room:82/8209  Patient Sergey Hernandez     YOB: 1940     Age:80 y.o. Patient says her breathing is improved today. Subjective    Subjective:  Symptoms:  Improved. She reports shortness of breath. No chest pain. Diet:  Adequate intake. Activity level: Normal.    Pain:  She reports no pain. Review of Systems   Constitutional: Negative for activity change and fever. Respiratory: Positive for shortness of breath. Cardiovascular: Negative for chest pain. Gastrointestinal: Negative for abdominal pain. Genitourinary: Negative for difficulty urinating. Musculoskeletal: Negative for arthralgias. Neurological: Negative for dizziness. Psychiatric/Behavioral: Negative for agitation. Objective         Vitals Last 24 Hours:  TEMPERATURE:  Temp  Av.7 °F (36.5 °C)  Min: 97.3 °F (36.3 °C)  Max: 98.1 °F (36.7 °C)  RESPIRATIONS RANGE: Resp  Avg: 15  Min: 14  Max: 16  PULSE OXIMETRY RANGE: SpO2  Av.7 %  Min: 91 %  Max: 95 %  PULSE RANGE: Pulse  Av.5  Min: 90  Max: 91  BLOOD PRESSURE RANGE: Systolic (15CJL), DVD:171 , Min:95 , PMO:156   ; Diastolic (03JLH), BXZ:24, Min:54, Max:69    I/O (24Hr): Intake/Output Summary (Last 24 hours) at 2021 0629  Last data filed at 2021 0600  Gross per 24 hour   Intake 240 ml   Output 400 ml   Net -160 ml     Objective:  General Appearance:  Comfortable. Vital signs: (most recent): Blood pressure 95/69, pulse 90, temperature 98.1 °F (36.7 °C), temperature source Temporal, resp. rate 16, height 4' 10\" (1.473 m), weight 118 lb (53.5 kg), SpO2 95 %. No fever. Lungs:  Normal effort and normal respiratory rate. Breath sounds clear to auscultation. Heart: Normal rate. Irregular rhythm.       Labs/Imaging/Diagnostics    Labs:  CBC:  Recent Labs     21  1255 21  0350 21  0442   WBC 5.1 4.8 4.8   RBC 4.96 4.46 4.64   HGB 14.4 13.0 13.3   HCT 45.8 40.9 42.5 MCV 92.3 91.7 91.6   RDW 13.4 13.4 13.4    170 173     CHEMISTRIES:  Recent Labs     06/21/21  1255 06/22/21  0350 06/23/21  0442    139 140   K 4.7 3.8 3.9   CL 99 99 100   CO2 29 31* 31*   BUN 16 18 26*   CREATININE 1.1* 1.2* 1.4*   GLUCOSE 96 94 96     PT/INR:  Recent Labs     06/21/21  1255   PROTIME 13.4*   INR 1.2     APTT:  Recent Labs     06/21/21  1255   APTT 37.6*     LIVER PROFILE:  Recent Labs     06/21/21  1255   AST 28   ALT 10   BILITOT 0.8   ALKPHOS 84       Imaging Last 24 Hours:  XR CHEST PORTABLE    Result Date: 6/21/2021  EXAMINATION: ONE XRAY VIEW OF THE CHEST 6/21/2021 12:28 pm COMPARISON: 07/25/2017 HISTORY: ORDERING SYSTEM PROVIDED HISTORY: Shortness of breath TECHNOLOGIST PROVIDED HISTORY: Reason for exam:->Shortness of breath What reading provider will be dictating this exam?->CRC FINDINGS: The lungs are without acute focal process. Right lower lobe atelectasis. There is no effusion or pneumothorax. Stable cardiomegaly. The osseous structures are without acute process. No acute process. Stable cardiomegaly. Lobe atelectasis. Assessment//Plan           Hospital Problems         Last Modified POA    Anticoagulated 6/22/2021 Yes    Dyspnea 6/21/2021 Yes    Atrial fibrillation with rapid ventricular response ([address] 6/22/2021 Yes        Assessment:  (Dyspnea   Dizziness  Afib     ). Plan:   ([address]  Cardiology following, plan for ECHO. Continue rest of meds. Home when ok with Cardiology. ).        Electronically signed by Laura Lopez MD on 6/23/21 at 6:29 AM EDT

## 2021-06-23 NOTE — CONSULTS
CARDIOLOGY CONSULTATION    Patient Name:  Paris Patterson    :  1940    Reason for Consultation:   Near syncope; atrial fibrillation rapid ventricular response    History of Present Illness:   Paris Patterson presents to [address] following history of sudden lightheadedness while attending physical therapy yesterday morning at approximately 9 AM. She has a longstanding history of atrial fibrillation for which she was on nadolol as well as apixaban. She left the facility and drove home but at home did not feel well and was somewhat short of breath but denied any chest discomfort. She then took her dogs to a residence where she borders them and being offered to be transported to the emergency room she elected to drive herself. While in the emergency room it was noted that she had chest discomfort yet denied it this evening. She was given intravenous diuretic as her proBNP was elevated at >1900 pg/mL. She denied any peripheral edema nor sensation of abdominal fullness. She was subsequently admitted for further observation. She has been seen in the distant past by me. She has no previous history of myocardial infarction. She denies any chest discomfort nor shortness of breath this evening. Past Medical History:   has a past medical history of Atrial fibrillation ([facility] [address] Surgical History:   has a past surgical history that includes Upper gastrointestinal endoscopy (2017). Social History:   reports that she quit smoking about 38 years ago. Her smoking use included cigarettes. She started smoking about 59 years ago. She has a 30.00 pack-year smoking history. She has never used smokeless tobacco. She reports current alcohol use. She reports that she does not use drugs.      Family History:  family history is remarkable for father  in his [de-identified] secondary to heart disease and had previous heart surgery and mother  at 80 secondary to complications of wheezing, no sputum production. No hemoptysis, pleuritic pain. · Gastrointestinal: No abdominal pain, appetite loss, blood in stools. No change in bowel habits. No hematemesis  · Genitourinary: No dysuria, trouble voiding or hematuria. No nocturia or increased frequency. · Musculoskeletal:  No gait disturbance, weakness or joint complaints. · Integumentary: No rash or pruritis. · Neurological: No headache, diplopia, change in muscle strength, numbness or tingling. No change in gait, balance, coordination, mood, affect, memory, mentation, behavior. · Psychiatric: No anxiety or depression. · Endocrine: No temperature intolerance. No excessive thirst, fluid intake, or urination. No tremor. · Hematologic/Lymphatic: No abnormal bruising or bleeding, blood clots or swollen lymph nodes. · Allergic/Immunologic: No nasal congestion or hives. Physical Examination:    Vital Signs: BP 95/69   Pulse 90   Temp 98.1 °F (36.7 °C) (Temporal)   Resp 16   Ht 4' 10\" (1.473 m)   Wt 118 lb (53.5 kg)   SpO2 91%   BMI 24.66 kg/m²   General appearance: Well preserved, ectomorphic body habitus, alert, no distress. Skin: Skin color, texture, turgor normal. No rashes or lesions. No induration or tightening palpated. Head: Normocephalic. No masses, lesions, tenderness or abnormalities  Eyes: Conjunctivae/corneas clear. PERRL, EOMs intact. Sclera non icteric. Ears: External ears normal. Canals clear. TM's clear bilaterally. Hearing normal to finger rub. Nose/Sinuses: Nares normal. Septum midline. Mucosa normal. No drainage or sinus tenderness. Oropharynx: Lips, mucosa, and tongue normal. Oropharynx clear with no exudate seen. Neck: Neck supple and symmetric. No adenopathy. Thyroid symmetric, normal size, without nodules. Trachea is midline. Carotids brisk in upstroke without bruits, no abnormal JVP noted at 45°. Chest: Even excursion  Lungs: Lungs clear to auscultation bilaterally.  No retractions or use of accessory muscles. No tactile vocal fremitus. No rhonchi, crackles or rales. Heart:  S1 > S2. Irregular, irregular rhythm. No gallop or murmur. No rub, palpable thrill or heave noted. PMI 5th intercostal space midclavicular line. Abdomen: Abdomen soft, scaphoid, non-tender. BS normal. No masses, organomegaly. No hernia noted. Extremities: Extremities normal. No deformities, edema, or skin discoloration. No cyanosis or clubbing noted to the nails. Peripheral pulses present 2+ upper extremities and present 1+  lower extremities. Musculoskeletal: Spine ROM normal. Muscular strength intact. Neuro: Cranial nerves intact. Motor: Strength 5/5 in all extremities. Reflexes 2+ in all extremities. No focal weakness. Sensory: grossly normal to touch. Coordination intact. Pertinent Labs:  CBC:   Recent Labs     06/21/21  1255 06/22/21  0350   WBC 5.1 4.8   HGB 14.4 13.0    170     BMP:  Recent Labs     06/21/21  1255 06/22/21  0350    139   K 4.7 3.8   CL 99 99   CO2 29 31*   BUN 16 18   CREATININE 1.1* 1.2*   GLUCOSE 96 94   LABGLOM 48 43     ABGs: No results found for: PH, PO2, PCO2  INR:   Recent Labs     06/21/21  1255   INR 1.2     PRO-BNP:   Lab Results   Component Value Date    PROBNP 1,945 (H) 06/21/2021      Cardiac Injury Profile: No results for input(s): CKTOTAL, CKMB, CKMBINDEX, TROPONINI in the last 72 hours. Lipid Profile: No results found for: TRIG, HDL, LDLCALC, CHOL   Thyroid:   Lab Results   Component Value Date    TSH 1.870 01/22/2018      Hemoglobin A1C: No components found for: HGBA1C   ECG:  See report    Radiology:  XR CHEST PORTABLE    Result Date: 6/21/2021  EXAMINATION: ONE XRAY VIEW OF THE CHEST 6/21/2021 12:28 pm COMPARISON: 07/25/2017 HISTORY: ORDERING SYSTEM PROVIDED HISTORY: Shortness of breath TECHNOLOGIST PROVIDED HISTORY: Reason for exam:->Shortness of breath What reading provider will be dictating this exam?->CRC FINDINGS: The lungs are without acute focal process.   Right lower lobe atelectasis. There is no effusion or pneumothorax. Stable cardiomegaly. The osseous structures are without acute process. No acute process. Stable cardiomegaly. Lobe atelectasis. Assessment:    Active Problems:    Anticoagulated    Dyspnea    Atrial fibrillation with rapid ventricular response (HCC)  Resolved Problems:    * No resolved hospital problems. *      Plan: Will obtain two-dimensional echocardiogram and based upon results will further adjust her medical regimen. Will also obtain orthostatic blood pressures. I am concerned that her electrocardiogram does suggest possible remote high lateral wall infarct. Will obtain previous records from my office and make further comment in the a.m. Will also obtain lipid profile in the a.m. and make appropriate recommendations. Will withhold any further diuretic presently as certainly I suspect her elevated proBNP may simply reflect her underlying atrial fibrillation. Additionally, I have once again asked Mrs. Cheyenne Yin if she had any chest discomfort whatsoever as noted in the emergency room history and she emphatically denied it. I have spent more than 45 minutes face to face with Yair Dawson reviewing notes and laboratory data with greater than 50% of this time instructing and counseling the patient regarding my findings and recommendations and I have answered all questions as posed to me by Ms. Cheyenne Yin. Oziel Clark DO DO, FACP, [facility], [facility]    NOTE:  This report was transcribed using voice recognition software. Every effort was made to ensure accuracy; however, inadvertent computerized transcription errors may be present.

## 2021-06-23 NOTE — CARE COORDINATION
6/23/21  Update CM Note; Patient is observation and pending an echo per cardiology. Plan is to possibly adjust medication based on echo report. Echo notified and will follow for completion.  Raghavendra Mathews RN Cm

## 2021-06-24 VITALS
OXYGEN SATURATION: 96 % | HEART RATE: 75 BPM | SYSTOLIC BLOOD PRESSURE: 106 MMHG | DIASTOLIC BLOOD PRESSURE: 73 MMHG | RESPIRATION RATE: 18 BRPM | WEIGHT: 118 LBS | BODY MASS INDEX: 24.77 KG/M2 | HEIGHT: 58 IN | TEMPERATURE: 97.6 F

## 2021-06-24 PROBLEM — R55 NEAR SYNCOPE: Status: ACTIVE | Noted: 2021-06-22

## 2021-06-24 LAB
ANION GAP SERPL CALCULATED.3IONS-SCNC: 8 MMOL/L (ref 7–16)
BUN BLDV-MCNC: 21 MG/DL (ref 6–23)
CALCIUM SERPL-MCNC: 9.7 MG/DL (ref 8.6–10.2)
CHLORIDE BLD-SCNC: 98 MMOL/L (ref 98–107)
CO2: 31 MMOL/L (ref 22–29)
CREAT SERPL-MCNC: 1 MG/DL (ref 0.5–1)
GFR AFRICAN AMERICAN: >60
GFR NON-AFRICAN AMERICAN: 53 ML/MIN/1.73
GLUCOSE BLD-MCNC: 97 MG/DL (ref 74–99)
HCT VFR BLD CALC: 45.9 % (ref 34–48)
HEMOGLOBIN: 14.4 G/DL (ref 11.5–15.5)
LV EF: 55 %
LVEF MODALITY: NORMAL
MCH RBC QN AUTO: 29.3 PG (ref 26–35)
MCHC RBC AUTO-ENTMCNC: 31.4 % (ref 32–34.5)
MCV RBC AUTO: 93.3 FL (ref 80–99.9)
PDW BLD-RTO: 13.3 FL (ref 11.5–15)
PLATELET # BLD: 173 E9/L (ref 130–450)
PMV BLD AUTO: 11 FL (ref 7–12)
POTASSIUM SERPL-SCNC: 3.9 MMOL/L (ref 3.5–5)
RBC # BLD: 4.92 E12/L (ref 3.5–5.5)
SODIUM BLD-SCNC: 137 MMOL/L (ref 132–146)
WBC # BLD: 4 E9/L (ref 4.5–11.5)

## 2021-06-24 PROCEDURE — 36415 COLL VENOUS BLD VENIPUNCTURE: CPT

## 2021-06-24 PROCEDURE — 6370000000 HC RX 637 (ALT 250 FOR IP): Performed by: FAMILY MEDICINE

## 2021-06-24 PROCEDURE — 80048 BASIC METABOLIC PNL TOTAL CA: CPT

## 2021-06-24 PROCEDURE — 93306 TTE W/DOPPLER COMPLETE: CPT

## 2021-06-24 PROCEDURE — 85027 COMPLETE CBC AUTOMATED: CPT

## 2021-06-24 PROCEDURE — 6370000000 HC RX 637 (ALT 250 FOR IP): Performed by: INTERNAL MEDICINE

## 2021-06-24 RX ORDER — FUROSEMIDE 20 MG/1
20 TABLET ORAL
Qty: 60 TABLET | Refills: 3 | Status: SHIPPED | OUTPATIENT
Start: 2021-06-25

## 2021-06-24 RX ORDER — DILTIAZEM HYDROCHLORIDE 120 MG/1
120 CAPSULE, COATED, EXTENDED RELEASE ORAL DAILY
Qty: 30 CAPSULE | Refills: 3 | Status: SHIPPED | OUTPATIENT
Start: 2021-06-25

## 2021-06-24 RX ORDER — DOCUSATE SODIUM 100 MG/1
100 CAPSULE, LIQUID FILLED ORAL DAILY
Status: DISCONTINUED | OUTPATIENT
Start: 2021-06-24 | End: 2021-06-25 | Stop reason: HOSPADM

## 2021-06-24 RX ORDER — ATORVASTATIN CALCIUM 20 MG/1
20 TABLET, FILM COATED ORAL DAILY
Qty: 30 TABLET | Refills: 3 | Status: ON HOLD | OUTPATIENT
Start: 2021-06-25 | End: 2022-06-21

## 2021-06-24 RX ORDER — LISINOPRIL 10 MG/1
10 TABLET ORAL DAILY
Status: DISCONTINUED | OUTPATIENT
Start: 2021-06-24 | End: 2021-06-25 | Stop reason: HOSPADM

## 2021-06-24 RX ORDER — LISINOPRIL 10 MG/1
10 TABLET ORAL DAILY
Qty: 30 TABLET | Refills: 3 | Status: SHIPPED | OUTPATIENT
Start: 2021-06-25

## 2021-06-24 RX ADMIN — LISINOPRIL 10 MG: 10 TABLET ORAL at 08:23

## 2021-06-24 RX ADMIN — APIXABAN 2.5 MG: 5 TABLET, FILM COATED ORAL at 08:23

## 2021-06-24 RX ADMIN — APIXABAN 2.5 MG: 5 TABLET, FILM COATED ORAL at 20:37

## 2021-06-24 RX ADMIN — NADOLOL 5 MG: 20 TABLET ORAL at 08:23

## 2021-06-24 RX ADMIN — DOCUSATE SODIUM 100 MG: 100 CAPSULE, LIQUID FILLED ORAL at 13:54

## 2021-06-24 RX ADMIN — POTASSIUM CHLORIDE 20 MEQ: 1500 TABLET, EXTENDED RELEASE ORAL at 08:23

## 2021-06-24 RX ADMIN — ATORVASTATIN CALCIUM 20 MG: 20 TABLET, FILM COATED ORAL at 08:23

## 2021-06-24 RX ADMIN — DILTIAZEM HYDROCHLORIDE 120 MG: 120 CAPSULE, COATED, EXTENDED RELEASE ORAL at 08:23

## 2021-06-24 ASSESSMENT — ENCOUNTER SYMPTOMS
ABDOMINAL PAIN: 0
SHORTNESS OF BREATH: 1

## 2021-06-24 ASSESSMENT — PAIN SCALES - GENERAL: PAINLEVEL_OUTOF10: 0

## 2021-06-24 NOTE — PROGRESS NOTES
Progress Note  Date:2021       Room:82/8209  Patient Dedrick Collet     YOB: 1940     Age:80 y.o. Patient says her breathing is improved today. Subjective    Subjective:  Symptoms:  Improved. She reports shortness of breath. No chest pain. Diet:  Adequate intake. Activity level: Normal.    Pain:  She reports no pain. Review of Systems   Constitutional: Negative for activity change and fever. Respiratory: Positive for shortness of breath. Cardiovascular: Negative for chest pain. Gastrointestinal: Negative for abdominal pain. Genitourinary: Negative for difficulty urinating. Musculoskeletal: Negative for arthralgias. Neurological: Negative for dizziness. Psychiatric/Behavioral: Negative for agitation. Objective         Vitals Last 24 Hours:  TEMPERATURE:  Temp  Av °F (36.1 °C)  Min: 96.7 °F (35.9 °C)  Max: 97.3 °F (36.3 °C)  RESPIRATIONS RANGE: Resp  Avg: 15  Min: 14  Max: 16  PULSE OXIMETRY RANGE: SpO2  Av %  Min: 93 %  Max: 97 %  PULSE RANGE: Pulse  Av.7  Min: 73  Max: 81  BLOOD PRESSURE RANGE: Systolic (88UTO), WE , Min:86 , GSV:82   ; Diastolic (38KSD), ZKM:72, Min:60, Max:69    I/O (24Hr): Intake/Output Summary (Last 24 hours) at 2021 0617  Last data filed at 2021 1841  Gross per 24 hour   Intake 720 ml   Output 350 ml   Net 370 ml     Objective:  General Appearance:  Comfortable. Vital signs: (most recent): Blood pressure 91/69, pulse 81, temperature 96.7 °F (35.9 °C), temperature source Temporal, resp. rate 16, height 4' 10\" (1.473 m), weight 118 lb (53.5 kg), SpO2 93 %. No fever. Lungs:  Normal effort and normal respiratory rate. Breath sounds clear to auscultation. Heart: Normal rate. Irregular rhythm.       Labs/Imaging/Diagnostics    Labs:  CBC:  Recent Labs     21  1255 21  0350 21  0442   WBC 5.1 4.8 4.8   RBC 4.96 4.46 4.64   HGB 14.4 13.0 13.3   HCT 45.8 40.9 42.5   MCV 92.3 91.7 91.6   RDW 13.4 13.4 13.4    170 173     CHEMISTRIES:  Recent Labs     06/21/21  1255 06/22/21  0350 06/23/21  0442    139 140   K 4.7 3.8 3.9   CL 99 99 100   CO2 29 31* 31*   BUN 16 18 26*   CREATININE 1.1* 1.2* 1.4*   GLUCOSE 96 94 96     PT/INR:  Recent Labs     06/21/21  1255   PROTIME 13.4*   INR 1.2     APTT:  Recent Labs     06/21/21  1255   APTT 37.6*     LIVER PROFILE:  Recent Labs     06/21/21  1255   AST 28   ALT 10   BILITOT 0.8   ALKPHOS 84       Imaging Last 24 Hours:  XR CHEST PORTABLE    Result Date: 6/21/2021  EXAMINATION: ONE XRAY VIEW OF THE CHEST 6/21/2021 12:28 pm COMPARISON: 07/25/2017 HISTORY: ORDERING SYSTEM PROVIDED HISTORY: Shortness of breath TECHNOLOGIST PROVIDED HISTORY: Reason for exam:->Shortness of breath What reading provider will be dictating this exam?->CRC FINDINGS: The lungs are without acute focal process. Right lower lobe atelectasis. There is no effusion or pneumothorax. Stable cardiomegaly. The osseous structures are without acute process. No acute process. Stable cardiomegaly. Lobe atelectasis. Assessment//Plan           Hospital Problems         Last Modified POA    Anticoagulated 6/22/2021 Yes    Dyspnea 6/21/2021 Yes    Atrial fibrillation with rapid ventricular response ([address] 6/22/2021 Yes    CHF (congestive heart failure), NYHA class I, acute on chronic, combined ([address] 6/23/2021 Yes        Assessment:  (Dyspnea   Dizziness  Afib     ). Plan:   ([address]  Cardiology following, plan for ECHO. Reduced ACE. Continue rest of meds. Home when ok with Cardiology. ).

## 2021-06-24 NOTE — PROGRESS NOTES
BP 91/69   Pulse 81   Temp 96.7 °F (35.9 °C) (Temporal)   Resp 16   Ht 4' 10\" (1.473 m)   Wt 118 lb (53.5 kg)   SpO2 93%   BMI 24.66 kg/m²   Patient Vitals for the past 96 hrs (Last 3 readings):   Weight   06/21/21 1120 118 lb (53.5 kg)     OBJECTIVE:    HEENT: PERRL, EOM  Intact; sclera non-icteric, conjunctiva pink. Carotids are brisk in upstroke with normal contour. No carotid bruits. Normal jugular venous pulsation at 45°. No palpable cervical nor supraclavicular nodes. Thyroid not palpable. Trachea midline. Chest: Even excursion  Lungs: few bilateral mid inspiratory crackles, no expiratory wheezes or rhonchi, no decreased tactile fremitus without inspiratory rales. Heart: Irregular, irregular rhythm; S1 > S2, no gallop; grade 1 - 2/6 systolic murmur heard at the apex. No clicks, rub, palpable thrills   or heaves. PMI nondisplaced, 5th intercostal space MCL. Abdomen: Soft, nontender, nondistended,  mildly protuberant, no masses or organomegaly. Bowel sounds active. Extremities: Without clubbing, cyanosis or edema. Pulses present 3+ upper extermities bilaterally; present 1+ DP and present 1+ PT bilaterally.      Data:   Scheduled Meds: Reviewed  Continuous Infusions:     Intake/Output Summary (Last 24 hours) at 6/23/2021 2343  Last data filed at 6/23/2021 1841  Gross per 24 hour   Intake 720 ml   Output 750 ml   Net -30 ml     CBC:   Recent Labs     06/21/21  1255 06/22/21  0350 06/23/21  0442   WBC 5.1 4.8 4.8   HGB 14.4 13.0 13.3   HCT 45.8 40.9 42.5    170 173     BMP:  Recent Labs     06/21/21  1255 06/22/21  0350 06/23/21  0442    139 140   K 4.7 3.8 3.9   CL 99 99 100   CO2 29 31* 31*   BUN 16 18 26*   CREATININE 1.1* 1.2* 1.4*   LABGLOM 48 43 36     ABGs: No results found for: PH, PO2, PCO2  INR:   Recent Labs     06/21/21  1255   INR 1.2     PRO-BNP:   Lab Results   Component Value Date    PROBNP 1,945 (H) 06/21/2021      TSH:   Lab Results   Component Value Date TSH 1.870 01/22/2018      Cardiac Injury Profile: No results for input(s): CKTOTAL, CKMB, TROPONINI in the last 72 hours. Lipid Profile: No results found for: TRIG, HDL, LDLCALC, CHOL   Hemoglobin A1C: No components found for: HGBA1C     RAD:   XR CHEST PORTABLE    Result Date: 6/21/2021  EXAMINATION: ONE XRAY VIEW OF THE CHEST 6/21/2021 12:28 pm COMPARISON: 07/25/2017 HISTORY: ORDERING SYSTEM PROVIDED HISTORY: Shortness of breath TECHNOLOGIST PROVIDED HISTORY: Reason for exam:->Shortness of breath What reading provider will be dictating this exam?->CRC FINDINGS: The lungs are without acute focal process. Right lower lobe atelectasis. There is no effusion or pneumothorax. Stable cardiomegaly. The osseous structures are without acute process. No acute process. Stable cardiomegaly. Lobe atelectasis. EKG: See Report  Echo: See Report      IMPRESSIONS:  Active Problems:    Anticoagulated    Dyspnea    Atrial fibrillation with rapid ventricular response (HCC)    CHF (congestive heart failure), NYHA class I, acute on chronic, combined ([address]  Resolved Problems:    * No resolved hospital problems. *      RECOMMENDATIONS:  We will decrease dosage of ACE inhibitor and place it on hold for systolic blood pressure less than +105 mmHg. Awaiting completion of two-dimensional echocardiogram and will further adjust her medical regimen but importantly will also hopefully see her renal function stabilized. I have spent more than 25 minutes face to face with Gayathri Angel and reviewing notes and laboratory data, with greater than 50% of this time instructing and counseling the patient face to face regarding my findings and recommendations and I have answered all questions as posed to me by Ms. Tiana Runao. Adia Mota, DO FACP,FACC,FSCAI      NOTE:  This report was transcribed using voice recognition software.   Every effort was made to ensure accuracy; however, inadvertent computerized transcription errors may be present

## 2021-06-24 NOTE — PROGRESS NOTES
Physician Progress Note      PATIENT:               Triny Randall  CSN #:                  [id number]  :                       1940  ADMIT DATE:       2021 3:42 PM  [address] DATE:  RESPONDING  PROVIDER #:        Tarun Betancur MD          QUERY TEXT:    Patient admitted with dyspnea and AF Noted documentation in cardiology   consult: \"her electrocardiogram does suggest possible remote high lateral wall   infarct. \". If possible, please document in the progress notes and discharge   summary if you are evaluating and/or treating any of the following: The medical record reflects the following:  Risk Factors: age CHF AF  Clinical Indicators: \"her electrocardiogram does suggest possible remote high   lateral wall infarct. \" Troponin 20 EKG: Septal infarct (cited on or before   2017) Lateral infarct , age undetermined  Treatment: Cardiology consult EKG cycle enzymes cardiac meds    Sara Yin RN BSN CCDS  Options provided:  -- NSTEMI  -- Type 2 MI  -- Demand Ischemia with MI  -- Demand Ischemia only, no MI  -- Unstable Angina  -- Other - I will add my own diagnosis  -- Disagree - Not applicable / Not valid  -- Disagree - Clinically unable to determine / Unknown  -- Refer to Clinical Documentation Reviewer    PROVIDER RESPONSE TEXT:    This patient has unstable angina.     Query created by: Uday Bañuelos on 2021 7:23 AM      Electronically signed by:  Tarun Betancur MD 2021 11:24 AM

## 2021-06-24 NOTE — CARE COORDINATION
6/24/21 Update CM Note; Patient continues to await completion of 2D echo. Cardiology following and pending echo to further adjust medications. Currently cardiology decreased Zestril to 10mg qd. Plan remains to home with no needs.  Aakash Deutsch RN CM

## 2021-06-25 ENCOUNTER — CARE COORDINATION (OUTPATIENT)
Dept: CASE MANAGEMENT | Age: 81
End: 2021-06-25

## 2021-06-25 NOTE — PROGRESS NOTES
PROGRESS NOTE       PATIENT PROBLEM LIST:  Principal Problem:    Near syncope  Active Problems:    Anticoagulated    Dyspnea    Atrial fibrillation with rapid ventricular response (HCC)    CHF (congestive heart failure), NYHA class I, acute on chronic, combined ([address]  Resolved Problems:    * No resolved hospital problems. *      SUBJECTIVE:  Kiko Mccall states she feels much better today and has no reproducible symptoms which brought her to the emergency room. she denies any lightheadedness, palpitations, shortness of breath nor chest discomfort presently. systolic blood pressure on the other hand has been low I.e. 86/60 mmHg. REVIEW OF SYSTEMS:  General ROS: negative for - fatigue, malaise,  weight gain or weight loss  Psychological ROS: negative for - anxiety , depression  Ophthalmic ROS: negative for - decreased vision or visual distortion. ENT ROS: negative  Allergy and Immunology ROS: negative  Hematological and Lymphatic ROS: negative  Endocrine: no heat or cold intolerance and no polyphagia, polydipsia, or polyuria  Respiratory ROS: negative for - hemoptysis, pleuritic pain, shortness of breath and wheezing  Cardiovascular ROS: positive for - irregular heartbeat and loss of consciousness. Gastrointestinal ROS: no abdominal pain, change in bowel habits, or black or bloody stools  Genito-Urinary ROS: no nocturia, dysuria, trouble voiding, frequency or hematuria  Musculoskeletal ROS: negative for- myalgias, arthralgias, or claudication  Neurological ROS: no TIA or stroke symptoms otherwise no significant change in symptoms or problems since yesterday as documented in previous progress notes.     SCHEDULED MEDICATIONS:   lisinopril  10 mg Oral Daily    docusate sodium  100 mg Oral Daily    apixaban  2.5 mg Oral BID    nadolol  5 mg Oral Daily    potassium chloride  20 mEq Oral Daily    atorvastatin  20 mg Oral Daily    dilTIAZem  120 mg Oral Daily       VITAL SIGNS: /73   Pulse 75   Temp 97.6 °F (36.4 °C) (Temporal)   Resp 18   Ht 4' 10\" (1.473 m)   Wt 118 lb (53.5 kg)   SpO2 96%   BMI 24.66 kg/m²   Patient Vitals for the past 96 hrs (Last 3 readings):   Weight   06/21/21 1120 118 lb (53.5 kg)     OBJECTIVE:    HEENT: PERRL, EOM  Intact; sclera non-icteric, conjunctiva pink. Carotids are brisk in upstroke with normal contour. No carotid bruits. Normal jugular venous pulsation at 45°. No palpable cervical nor supraclavicular nodes. Thyroid not palpable. Trachea midline. Chest: Even excursion  Lungs: few bilateral mid inspiratory crackles, no expiratory wheezes or rhonchi, no decreased tactile fremitus without inspiratory rales. Heart: Irregular, irregular rhythm; S1 > S2, no gallop; grade 1  2/6 systolic murmur heard at the apex. No clicks, rub, palpable thrills   or heaves. PMI nondisplaced, 5th intercostal space MCL. Abdomen: Soft, nontender, nondistended,  mildly protuberant, no masses or organomegaly. Bowel sounds active. Extremities: Without clubbing, cyanosis or edema. Pulses present 3+ upper extermities bilaterally; present 1+ DP and present 1+ PT bilaterally. Data:   Scheduled Meds: Reviewed  Continuous Infusions:     Intake/Output Summary (Last 24 hours) at 6/24/2021 2222  Last data filed at 6/24/2021 1830  Gross per 24 hour   Intake 1200 ml   Output 300 ml   Net 900 ml     CBC:   Recent Labs     06/22/21  0350 06/23/21  0442 06/24/21  0653   WBC 4.8 4.8 4.0*   HGB 13.0 13.3 14.4   HCT 40.9 42.5 45.9    173 173     BMP:  Recent Labs     06/22/21  0350 06/23/21  0442 06/24/21  0653    140 137   K 3.8 3.9 3.9   CL 99 100 98   CO2 31* 31* 31*   BUN 18 26* 21   CREATININE 1.2* 1.4* 1.0   LABGLOM 43 36 53     ABGs: No results found for: PH, PO2, PCO2  INR:   No results for input(s): INR in the last 72 hours.   PRO-BNP:   Lab Results   Component Value Date    PROBNP 1,945 (H) 06/21/2021      TSH:   Lab Results   Component Value Date    TSH 1.870 01/22/2018      Cardiac Injury Profile: No results for input(s): CKTOTAL, CKMB, TROPONINI in the last 72 hours. Lipid Profile: No results found for: TRIG, HDL, LDLCALC, CHOL   Hemoglobin A1C: No components found for: HGBA1C     RAD:   XR CHEST PORTABLE    Result Date: 6/21/2021  EXAMINATION: ONE XRAY VIEW OF THE CHEST 6/21/2021 12:28 pm COMPARISON: 07/25/2017 HISTORY: ORDERING SYSTEM PROVIDED HISTORY: Shortness of breath TECHNOLOGIST PROVIDED HISTORY: Reason for exam:->Shortness of breath What reading provider will be dictating this exam?->CRC FINDINGS: The lungs are without acute focal process. Right lower lobe atelectasis. There is no effusion or pneumothorax. Stable cardiomegaly. The osseous structures are without acute process. No acute process. Stable cardiomegaly. Lobe atelectasis. EKG: See Report  Echo: See Report      IMPRESSIONS:  Principal Problem:    Near syncope  Active Problems:    Anticoagulated    Dyspnea    Atrial fibrillation with rapid ventricular response (HCC)    CHF (congestive heart failure), NYHA class I, acute on chronic, combined ([address]  Resolved Problems:    * No resolved hospital problems. *      RECOMMENDATIONS:  We will decrease dosage of ACE inhibitor and place it on hold for systolic blood pressure less than +105 mmHg. Awaiting completion of two-dimensional echocardiogram and will further adjust her medical regimen but importantly will also hopefully see her renal function stabilized. I have spent more than 25 minutes face to face with Mukesh Fraire and reviewing notes and laboratory data, with greater than 50% of this time instructing and counseling the patient face to face regarding my findings and recommendations and I have answered all questions as posed to me by Ms. Makenna Flores.     Yg Martin, DO FACP,FACC,FSCAI      NOTE:  This report was transcribed using voice recognition software.   Every effort was made to ensure accuracy; however, inadvertent computerized transcription errors may be present

## 2021-06-25 NOTE — CARE COORDINATION
Jamie 45 Transitions Initial Follow Up Call    Call within 2 business days of discharge: Yes    Patient: [place] Gentile Patient : 1940   MRN: [id number]  Reason for Admission: near syncope  Discharge Date: 21 RARS: Readmission Risk Score: 11      Last Discharge [facility]       Complaint Diagnosis Description Type Department Provider    21 Shortness of Breath Chest pain, unspecified type . .. ED to Hosp-Admission (Discharged) (ADMITTED) [facility] Juhi Mcintosh MD; Sue Green . ..        -First attempt to reach the patient for initial BPCI call post hospital discharge. VM box full, unable to leave message. Follow Up  No future appointments.     Lcio Green RN

## 2021-06-25 NOTE — DISCHARGE SUMMARY
Discharge Summary    Date: 6/25/2021  Patient Name: Osmany Medrano YOB: 1940 Age: [de-identified] y.o. Admit Date: 6/21/2021  Discharge Date: 6/24/2021  Discharge Condition: Stable    Admission Diagnosis  Dyspnea (R06.00);CHF (congestive heart failure), NYHA class I, acute on chronic, combined (HCC) (I50.43)     Discharge Diagnosis  Principal Problem: Near syncopeActive Problems: Anticoagulated Dyspnea Atrial fibrillation with rapid ventricular response (HCC) CHF (congestive heart failure), NYHA class I, acute on chronic, combined (HCC)Resolved Problems: * No resolved hospital problems. [facility] Stay  Narrative of Hospital Course:  Patient admitted with dyspnea from mild CHF. Improved with IV diureses. Cardiology followed. Consultants:  IP CONSULT TO INTERNAL MEDICINEIP CONSULT TO CARDIOLOGY    Surgeries/procedures Performed:       Treatments:            Discharge Plan/Disposition:  Home    Hospital/Incidental Findings Requiring Follow Up:    Patient Instructions:    Diet: Cardiac Diet    Activity:Activity as Tolerated  For number of days (if applicable): Other Instructions:    Provider Follow-Up:   No follow-ups on file.      Significant Diagnostic Studies:    Recent Labs:  Admission on 06/21/2021, Discharged on 06/24/2021Ventricular Rate                              Date: 06/21/2021Value: 93          Ref range: BPM                Status: FinalAtrial Rate                                   Date: 06/21/2021Value: 241         Ref range: BPM                Status: FinalQRS Duration                                  Date: 06/21/2021Value: 68          Ref range: ms                 Status: FinalQ-T Interval                                  Date: 06/21/2021Value: 354         Ref range: ms                 Status: FinalQTc Calculation (Bazett)                      Date: 06/21/2021Value: 440         Ref range: ms                 Status: FinalR Axis                                        Date: Date: 06/21/2021Value: 2.7         Ref range: 0.0 - 6.0 %        Status: FinalBasophils %                                   Date: 06/21/2021Value: 0.6         Ref range: 0.0 - 2.0 %        Status: FinalNeutrophils Absolute                          Date: 06/21/2021Value: 3.33        Ref range: 1.80 - 7.30 E9/L   Status: FinalImmature Granulocytes #                       Date: 06/21/2021Value: 0.01        Ref range: E9/L               Status: FinalLymphocytes Absolute                          Date: 06/21/2021Value: 1.19*       Ref range: 1.50 - 4.00 E9/L   Status: FinalMonocytes Absolute                            Date: 06/21/2021Value: 0.42        Ref range: 0.10 - 0.95 E9/L   Status: FinalEosinophils Absolute                          Date: 06/21/2021Value: 0.14        Ref range: 0.05 - 0.50 E9/L   Status: FinalBasophils Absolute                            Date: 06/21/2021Value: 0.03        Ref range: 0.00 - 0.20 E9/L   Status: FinalSodium                                        Date: 06/21/2021Value: 137         Ref range: 132 - 146 mmol/L   Status: FinalPotassium reflex Magnesium                    Date: 06/21/2021Value: 4.7         Ref range: 3.5 - 5.0 mmol/L   Status: FinalChloride                                      Date: 06/21/2021Value: 99          Ref range: 98 - 107 mmol/L    Status: FinalCO2                                           Date: 06/21/2021Value: 29          Ref range: 22 - 29 mmol/L     Status: FinalAnion Gap                                     Date: 06/21/2021Value: 9           Ref range: 7 - 16 mmol/L      Status: FinalGlucose                                       Date: 06/21/2021Value: 96          Ref range: 74 - 99 mg/dL      Status: FinalBUN                                           Date: 06/21/2021Value: 16          Ref range: 6 - 23 mg/dL       Status: FinalCREATININE                                    Date: 06/21/2021Value: 1.1*        Ref range: 0.5 - 1.0 mg/dL Status: [employer]                      Date: 06/21/2021Value: 50          Ref range: >=60 mL/min/1.73   Status: Final              Comment: Chronic Kidney Disease: less than 60 ml/min/1.73 sq.m. Kidney Failure: less than 15 ml/min/1.73 sq. m. Results valid for patients 18 years and older. GFR                           Date: 06/21/2021Value: 58            Status: FinalCalcium                                       Date: 06/21/2021Value: 9.9         Ref range: 8.6 - 10.2 mg/dL   Status: FinalTotal Protein                                 Date: 06/21/2021Value: 7.3         Ref range: 6.4 - 8.3 g/dL     Status: FinalAlbumin                                       Date: 06/21/2021Value: 4.3         Ref range: 3.5 - 5.2 g/dL     Status: FinalTotal Bilirubin                               Date: 06/21/2021Value: 0.8         Ref range: 0.0 - 1.2 mg/dL    Status: FinalAlkaline Phosphatase                          Date: 06/21/2021Value: 84          Ref range: 35 - 104 U/L       Status: FinalALT                                           Date: 06/21/2021Value: 10          Ref range: 0 - 32 U/L         Status: FinalAST                                           Date: 06/21/2021Value: 28          Ref range: 0 - 31 U/L         Status: FinalTroponin, High Sensitivity                    Date: 06/21/2021Value: 20*         Ref range: 0 - 9 ng/L         Status: Final              Comment: High Sensitivity Troponin values cannot be compared withother Troponin methodologies. Patients with high levels of Biotin oral intake (i.e. >5 mg/day)may have falsely decreased Troponin levels. Samples collectedwithin 8 hours of biotin intake may require additional informationfor diagnosis. Pro-BNP                                       Date: 06/21/2021Value: 1,945*      Ref range: 0 - 450 pg/mL      Status: FinalaPTT                                          Date: 06/21/2021Value: 37.6*       Ref range: 24.5 - 35.1 sec range: 7.0 - 12.0 fL      Status: FinalSodium                                        Date: 06/22/2021Value: 139         Ref range: 132 - 146 mmol/L   Status: FinalPotassium                                     Date: 06/22/2021Value: 3.8         Ref range: 3.5 - 5.0 mmol/L   Status: FinalChloride                                      Date: 06/22/2021Value: 99          Ref range: 98 - 107 mmol/L    Status: FinalCO2                                           Date: 06/22/2021Value: 31*         Ref range: 22 - 29 mmol/L     Status: FinalAnion Gap                                     Date: 06/22/2021Value: 9           Ref range: 7 - 16 mmol/L      Status: FinalGlucose                                       Date: 06/22/2021Value: 94          Ref range: 74 - 99 mg/dL      Status: FinalBUN                                           Date: 06/22/2021Value: 18          Ref range: 6 - 23 mg/dL       Status: FinalCREATININE                                    Date: 06/22/2021Value: 1.2*        Ref range: 0.5 - 1.0 mg/dL    Status: FinalGFR Non-                      Date: 06/22/2021Value: 43          Ref range: >=60 mL/min/1.73   Status: Final              Comment: Chronic Kidney Disease: less than 60 ml/min/1.73 sq.m. Kidney Failure: less than 15 ml/min/1.73 sq. m. Results valid for patients 18 years and older. GFR                           Date: 06/22/2021Value: 52            Status: FinalCalcium                                       Date: 06/22/2021Value: 9.5         Ref range: 8.6 - 10.2 mg/dL   Status: [address] [facility]                                           Date: 06/23/2021Value: 4.8         Ref range: 4.5 - 11.5 E9/L    Status: FinalRBC                                           Date: 06/23/2021Value: 4.64        Ref range: 3.50 - 5.50 E12/L  Status: FinalHemoglobin                                    Date: 06/23/2021Value: 13.3        Ref range: 11.5 - 15.5 g/dL   Status: FinalHematocrit Date: 06/23/2021Value: 42.5        Ref range: 34.0 - 48.0 %      Status: FinalMCV                                           Date: 06/23/2021Value: 91.6        Ref range: 80.0 - 99.9 fL     Status: [facility]                                           Date: 06/23/2021Value: 28.7        Ref range: 26.0 - 35.0 pg     Status: [address]                                          Date: 06/23/2021Value: 31.3*       Ref range: 32.0 - 34.5 %      Status: FinalRDW                                           Date: 06/23/2021Value: 13.4        Ref range: 11.5 - 15.0 fL     Status: FinalPlatelets                                     Date: 06/23/2021Value: 173         Ref range: 130 - 450 E9/L     Status: FinalMPV                                           Date: 06/23/2021Value: 10.8        Ref range: 7.0 - 12.0 fL      Status: FinalSodium                                        Date: 06/23/2021Value: 140         Ref range: 132 - 146 mmol/L   Status: FinalPotassium                                     Date: 06/23/2021Value: 3.9         Ref range: 3.5 - 5.0 mmol/L   Status: FinalChloride                                      Date: 06/23/2021Value: 100         Ref range: 98 - 107 mmol/L    Status: FinalCO2                                           Date: 06/23/2021Value: 31*         Ref range: 22 - 29 mmol/L     Status: FinalAnion Gap                                     Date: 06/23/2021Value: 9           Ref range: 7 - 16 mmol/L      Status: FinalGlucose                                       Date: 06/23/2021Value: 96          Ref range: 74 - 99 mg/dL      Status: FinalBUN                                           Date: 06/23/2021Value: 26*         Ref range: 6 - 23 mg/dL       Status: FinalCREATININE                                    Date: 06/23/2021Value: 1.4*        Ref range: 0.5 - 1.0 mg/dL    Status: FinalGFR Non-                      Date: 06/23/2021Value: 36          Ref range: >=60 mL/min/1.73 Status: Final              Comment: Chronic Kidney Disease: less than 60 ml/min/1.73 sq.m. Kidney Failure: less than 15 ml/min/1.73 sq. m. Results valid for patients 18 years and older. GFR                           Date: 06/23/2021Value: 44            Status: FinalCalcium                                       Date: 06/23/2021Value: 9.5         Ref range: 8.6 - 10.2 mg/dL   Status: [address] [facility]                                           Date: 06/24/2021Value: 4.0*        Ref range: 4.5 - 11.5 E9/L    Status: FinalRBC                                           Date: 06/24/2021Value: 4.92        Ref range: 3.50 - 5.50 E12/L  Status: FinalHemoglobin                                    Date: 06/24/2021Value: 14.4        Ref range: 11.5 - 15.5 g/dL   Status: FinalHematocrit                                    Date: 06/24/2021Value: 45.9        Ref range: 34.0 - 48.0 %      Status: FinalMCV                                           Date: 06/24/2021Value: 93.3        Ref range: 80.0 - 99.9 fL     Status: [address]                                           Date: 06/24/2021Value: 29.3        Ref range: 26.0 - 35.0 pg     Status: [facility]                                          Date: 06/24/2021Value: 31.4*       Ref range: 32.0 - 34.5 %      Status: FinalRDW                                           Date: 06/24/2021Value: 13.3        Ref range: 11.5 - 15.0 fL     Status: FinalPlatelets                                     Date: 06/24/2021Value: 173         Ref range: 130 - 450 E9/L     Status: FinalMPV                                           Date: 06/24/2021Value: 11.0        Ref range: 7.0 - 12.0 fL      Status: FinalSodium                                        Date: 06/24/2021Value: 137         Ref range: 132 - 146 mmol/L   Status: FinalPotassium                                     Date: 06/24/2021Value: 3.9         Ref range: 3.5 - 5.0 mmol/L   Status: FinalChloride                                      Date: 06/24/2021Value: 98          Ref range: 98 - 107 mmol/L    Status: FinalCO2                                           Date: 06/24/2021Value: 31*         Ref range: 22 - 29 mmol/L     Status: FinalAnion Gap                                     Date: 06/24/2021Value: 8           Ref range: 7 - 16 mmol/L      Status: FinalGlucose                                       Date: 06/24/2021Value: 97          Ref range: 74 - 99 mg/dL      Status: FinalBUN                                           Date: 06/24/2021Value: 21          Ref range: 6 - 23 mg/dL       Status: FinalCREATININE                                    Date: 06/24/2021Value: 1.0         Ref range: 0.5 - 1.0 mg/dL    Status: FinalGFR Non-                      Date: 06/24/2021Value: 53          Ref range: >=60 mL/min/1.73   Status: Final              Comment: Chronic Kidney Disease: less than 60 ml/min/1.73 sq.m. Kidney Failure: less than 15 ml/min/1.73 sq. m. Results valid for patients 18 years and older. GFR                           Date: 06/24/2021Value: >60           Status: FinalCalcium                                       Date: 06/24/2021Value: 9.7         Ref range: 8.6 - 10.2 mg/dL   Status: Final------------    Radiology last 7 days:  XR CHEST PORTABLEResult Date: 6/21/2021No acute process. Stable cardiomegaly. Lobe atelectasis.       [unfilled]    Discharge Medications    Discharge Medication List as of 6/24/2021 10:24 PMSTART taking these medicationsatorvastatin (LIPITOR) 20 MG tabletTake 1 tablet by mouth daily, Disp-30 tablet, R-3Normallisinopril (PRINIVIL;ZESTRIL) 10 MG tabletTake 1 tablet by mouth daily, Disp-30 tablet, R-3NormaldilTIAZem (CARDIZEM CD) 120 MG extended release capsuleTake 1 capsule by mouth daily, Disp-30 capsule, R-3Normal    Discharge Medication List as of 6/24/2021 10:24 PMCONTINUE these medications which have CHANGEDfurosemide (LASIX) 20 MG tabletTake 1 tablet by mouth three times a week Mon-Weds-Sat only, Disp-60 tablet, R-3Normal    Discharge Medication List as of 6/24/2021 10:24 Frank Barters these medications which have NOT CHANGEDalbuterol sulfate HFA (PROVENTIL HFA) 108 (90 Base) MCG/ACT inhalerInhale 2 puffs into the lungs every 6 hours as needed for Wheezing, Disp-1 Inhaler, R-3Normalferrous sulfate 325 (65 Fe) MG tabletTake 325 mg by mouth daily (with breakfast)Historical MedNADOLOL POTake 5 mg by mouth daily Historical Medpotassium chloride (KLOR-CON M) 20 MEQ extended release tabletTake 20 mEq by mouth dailyHistorical Medalendronate (FOSAMAX) 70 MG tabletTake 70 mg by mouth every 7 days SundaysHistorical Medapixaban (ELIQUIS) 5 MG TABS tabletTake 2.5 mg by mouth 2 times daily Historical MedTiotropium Bromide-Olodaterol (STIOLTO RESPIMAT) 2.5-2.5 MCG/ACT AERSInhale 2 actuation into the lungs daily, Disp-1 Inhaler, R-0Sample    Discharge Medication List as of 6/24/2021 10:24 PMSTOP taking these medicationspantoprazole (PROTONIX) 40 MG tabletComments:Reason for Stopping:sucralfate (CARAFATE) 1 GM/10ML suspensionComments:Reason for Stopping:amLODIPine-benazepril (LOTREL) 5-40 MG per capsuleComments:Reason for Stopping:simvastatin (ZOCOR) 20 MG tabletComments:Reason for Stopping:    Time Spent on Discharge:1E] minutes were spent in patient examination, evaluation, counseling as well as medication reconciliation, prescriptions for required medications, discharge plan, and follow up.     Electronically signed by Abdon Solis MD on 6/25/21 at 6:25 AM EDT

## 2021-06-28 ENCOUNTER — CARE COORDINATION (OUTPATIENT)
Dept: CASE MANAGEMENT | Age: 81
End: 2021-06-28

## 2021-06-28 NOTE — CARE COORDINATION
Jaime 45 Transitions Initial Follow Up Call    Call within 2 business days of discharge: Yes    Patient: Bull Smith Patient : 1940   MRN: [id number]  Reason for Admission: near syncope  Discharge Date: 21 RARS: Readmission Risk Score: 11      Last Discharge [facility]       Complaint Diagnosis Description Type Department Provider    21 Shortness of Breath Chest pain, unspecified type . .. ED to Hosp-Admission (Discharged) (ADMITTED) [facility] 8S CDU Kayleen Hashimoto, MD; Lisbeth Watts . ..        -Second attempt to reach the patient for initial BPCI call post hospital discharge. \"Memory full\" as per phone automation, unable to leave message. Follow Up  No future appointments.     Ezra Garcia RN

## 2021-07-06 ENCOUNTER — CARE COORDINATION (OUTPATIENT)
Dept: CASE MANAGEMENT | Age: 81
End: 2021-07-06

## 2021-07-06 NOTE — CARE COORDINATION
Jaime 45 Transitions Follow Up Call    2021    Patient: Juanita Lechuga  Patient : 1940   MRN: [id number]  Reason for Admission:   Discharge Date: 21 RARS: Readmission Risk Score: 11    Attempted to reach patient by phone regarding follow up; BPCI-A. Unable to reach patient; no answer, voice mailbox full. Attempted to reach office of Dr. Darby Cabrera to confirm patient scheduled f/u appointment; message states not taking calls at this time due to high call volume.       Yuliya Olivo RN

## 2021-07-12 ENCOUNTER — CARE COORDINATION (OUTPATIENT)
Dept: CASE MANAGEMENT | Age: 81
End: 2021-07-12

## 2021-07-12 NOTE — CARE COORDINATION
Jaime 45 Transitions Follow Up Call    2021    Patient: Juanita Lechuga  Patient : 1940   MRN: [id number]  Reason for Admission: Chest pain  Discharge Date: 21 RARS: Readmission Risk Score: 11       Attempted to contact patient for transitions call. Memory full, unable to leave message. Attempted PCP office to check on HFU appt. Office closed. Follow Up  No future appointments.     Janeen Garay RN

## 2021-07-15 ENCOUNTER — CARE COORDINATION (OUTPATIENT)
Dept: CASE MANAGEMENT | Age: 81
End: 2021-07-15

## 2021-07-15 NOTE — CARE COORDINATION
Jaime 45 Transitions Follow Up Call    7/15/2021    Patient: Kiko Mccall  Patient : 1940   MRN: [id number]  Reason for Admission: Chest pain  Discharge Date: 21 RARS: Readmission Risk Score: 11         Attempted to contact patient for BPCI-A call. Memory full, unable to leave message. Follow Up  No future appointments.     Meghan Vo RN

## 2021-07-23 ENCOUNTER — CARE COORDINATION (OUTPATIENT)
Dept: CASE MANAGEMENT | Age: 81
End: 2021-07-23

## 2021-07-23 NOTE — CARE COORDINATION
Jaime 45 Transitions Initial Follow Up Call    Call within 2 business days of discharge:     Patient: Mukesh Fraire Patient : 1940   MRN: [id number]  Reason for Admission:   Discharge Date: 21 RARS: Readmission Risk Score: 11      Last Discharge [facility]       Complaint Diagnosis Description Type Department Provider    21 Shortness of Breath Chest pain, unspecified type . .. ED to Hosp-Admission (Discharged) (ADMITTED) [facility] 8S CDU Alix Aggarwal MD; Josemanuel Abreu . .. Spoke with: Mukesh Fraire, patient    Facility:  [facility]     Contacted patient for initial BPCI-A follow up. Spoke with Angelneftaly Sandympf who was very upset that she has been receiving calls everyday from an affiliate of [facility] ([facility]). Patient stated she has not been answering the phone so that is probably the reason they are calling her everyday. She said she wants the calls to stop. CTN apologized and informed her that she would reach out to her director. CTN introduced self and provided information about the Medicare bundle program.  Angelarqian Roque stated she is okay with follow up calls but does NOT want calls everyday or multiple times a week. Informed her that we typically follow up once a week for the first 30 days and then push the calls out if she is no longer having any issues or signs/symptoms. Patient verbalized understanding and is in agreement with this. Marcioyury Sandympf stated she is feeling much better. Stated she is no longer short of breath and able to do more w/o becoming short of breath. She is trying not to overexert herself. No c/o chest pain/discomfort, palpitations that she is aware of, swelling or weight gain. Denies any syncopal episodes. Reports she saw her cardiologist and medications have been changed. Attempted to review medications but patient declined. She stated she does not have them with her right now.   Chantel Ades for being short of CTN and

## 2021-07-30 ENCOUNTER — CARE COORDINATION (OUTPATIENT)
Dept: CASE MANAGEMENT | Age: 81
End: 2021-07-30

## 2021-07-30 NOTE — CARE COORDINATION
Jaime 45 Transitions Follow Up Call    2021    Patient: Ezra Castro  Patient : 1940   MRN: [id number]  Reason for Admission:   Discharge Date: 21 RARS: Readmission Risk Score: 11    Attempted to contact patient for BPCI-A follow up. Unable to reach patient. Recording stated to please call again. No option to leave a message. Will try again at a later time. Follow Up  No future appointments.     Hernandez Lee RN

## 2021-08-09 ENCOUNTER — CARE COORDINATION (OUTPATIENT)
Dept: CASE MANAGEMENT | Age: 81
End: 2021-08-09

## 2021-08-09 NOTE — CARE COORDINATION
Jaime 45 Transitions Follow Up Call    2021    Patient: Star Luu  Patient : 1940   MRN: <[id number]>  Reason for Admission:   Discharge Date: 21 RARS: Readmission Risk Score: 11    Attempted to contact patient for follow up BPCI-A transition call. Unable to leave a voicemail message to return call. Message received, called party is temporarily unavailable. Will continue to follow. Care Transitions Subsequent and Final Call    Subsequent and Final Calls  Care Transitions Interventions  Other Interventions: Follow Up  No future appointments.     Stone Vizcaino LPN

## 2021-08-19 ENCOUNTER — CARE COORDINATION (OUTPATIENT)
Dept: CASE MANAGEMENT | Age: 81
End: 2021-08-19

## 2021-08-19 NOTE — CARE COORDINATION
Jaime 45 Transitions Follow Up Call    2021    Patient: Lucas Terry  Patient : 1940   MRN: <[id number]>  Reason for Admission:   Discharge Date: 21 RARS: Readmission Risk Score: 11       Attempted to contact patient for follow up BPCI-A transition call. Unable to leave a voicemail message to return call. Message received states not available now. Will continue to follow. Care Transitions Subsequent and Final Call    Subsequent and Final Calls  Care Transitions Interventions  Other Interventions: Follow Up  No future appointments.     Amaury Morrissey LPN

## 2021-08-30 ENCOUNTER — CARE COORDINATION (OUTPATIENT)
Dept: CASE MANAGEMENT | Age: 81
End: 2021-08-30

## 2021-08-30 NOTE — CARE COORDINATION
Jaime 45 Transitions Follow Up Call    2021    Patient: Scar Abbott  Patient : 1940   MRN: <[id number]>  Reason for Admission:   Discharge Date: 21 RARS: Readmission Risk Score: 11       Attempted to contact patient for follow up BPCI-A transition call. Unable to leave a voicemail message to return call. Voicemail full. Will continue to follow. Care Transitions Subsequent and Final Call    Subsequent and Final Calls  Care Transitions Interventions  Other Interventions: Follow Up  No future appointments.     Lora Fox LPN

## 2021-09-10 ENCOUNTER — CARE COORDINATION (OUTPATIENT)
Dept: CASE MANAGEMENT | Age: 81
End: 2021-09-10

## 2021-09-21 ENCOUNTER — CARE COORDINATION (OUTPATIENT)
Dept: CASE MANAGEMENT | Age: 81
End: 2021-09-21

## 2021-09-21 NOTE — CARE COORDINATION
Jaime 45 Transitions Follow Up Call    2021    Patient: Ezra Castro  Patient : 1940   MRN: [id number]  Reason for Admission:   Discharge Date: 21 RARS: Readmission Risk Score: 11    Attempted to contact patient for final BPCI-A follow up. Unable to reach patient. Left message with contact information and request for call back. BPCI-A bundle ending. CTN signing off. Follow Up  No future appointments.     Hernandez Lee RN

## 2021-09-24 ENCOUNTER — TELEPHONE (OUTPATIENT)
Dept: PRIMARY CARE CLINIC | Age: 81
End: 2021-09-24

## 2021-10-02 ENCOUNTER — HOSPITAL ENCOUNTER (EMERGENCY)
Age: 81
Discharge: HOME | End: 2021-10-02
Attending: EMERGENCY MEDICINE
Payer: MEDICARE

## 2021-10-02 VITALS
RESPIRATION RATE: 16 BRPM | HEART RATE: 89 BPM | TEMPERATURE: 98.2 F | OXYGEN SATURATION: 94 % | DIASTOLIC BLOOD PRESSURE: 78 MMHG | SYSTOLIC BLOOD PRESSURE: 124 MMHG

## 2021-10-02 DIAGNOSIS — T50.901A ACCIDENTAL MEDICATION ERROR, INITIAL ENCOUNTER: Primary | ICD-10-CM

## 2021-10-02 PROCEDURE — 93005 ELECTROCARDIOGRAM TRACING: CPT | Performed by: EMERGENCY MEDICINE

## 2021-10-02 PROCEDURE — 99283 EMERGENCY DEPT VISIT LOW MDM: CPT

## 2021-10-02 NOTE — ED PROVIDER NOTES
EXAM--------------------------------------    Constitutional/General: Alert and oriented x3  Head: Normocephalic and atraumatic  Eyes: PERRL, EOMI, sclera non icteric  Mouth: Oropharynx clear, handling secretions, no trismus, no asymmetry of the posterior oropharynx or uvular edema  Neck: Supple, full ROM, no stridor, no meningeal signs  Respiratory: Lungs clear to auscultation bilaterally, no wheezes, rales, or rhonchi. Not in respiratory distress  Cardiovascular:  Regular rate. Regular rhythm. No murmurs, no aortic murmurs, no gallops, or rubs. 2+ distal pulses. Equal extremity pulses. Chest: No chest wall tenderness  GI:  Abdomen Soft, Non tender, Non distended. No rebound, guarding, or rigidity. No pulsatile masses. Musculoskeletal: Moves all extremities x 4. Warm and well perfused, no clubbing, cyanosis, or edema. Capillary refill <3 seconds  Integument: skin warm and dry. No rashes. Neurologic: GCS 15, no focal deficits, symmetric strength 5/5 in the upper and lower extremities bilaterally  Psychiatric: Normal Affect          -------------------------------------------------- RESULTS -------------------------------------------------  I have personally reviewed all laboratory and imaging results for this patient. Results are listed below.      LABS: (Lab results interpreted by me)  Results for orders placed or performed during the hospital encounter of 10/02/21   EKG 12 Lead   Result Value Ref Range    Ventricular Rate 88 BPM    Atrial Rate 300 BPM    QRS Duration 58 ms    Q-T Interval 344 ms    QTc Calculation (Bazett) 416 ms    R Axis 138 degrees    T Axis 43 degrees   ,       RADIOLOGY:  Interpreted by Radiologist unless otherwise specified  No orders to display         EKG Interpretation  Interpreted by emergency department physician, Dr. Meg Awan    Date of EKG: 10/2/21  Time: 1922    Rhythm: atrial fibrillation - controlled  Rate: 88  Axis: right  Conduction: normal  ST Segments: no acute change  T Waves: no acute change    Clinical Impression: No findings suggestive of acute ischemia or injury  Comparison to prior EKG: stable as compared to patient's most recent EKG      ------------------------- NURSING NOTES AND VITALS REVIEWED ---------------------------   The nursing notes within the ED encounter and vital signs as below have been reviewed by myself  /78   Pulse 89   Temp 98.2 °F (36.8 °C) (Temporal)   Resp 16   SpO2 94%     Oxygen Saturation Interpretation: Normal    The patients available past medical records and past encounters were reviewed. ------------------------------ ED COURSE/MEDICAL DECISION MAKING----------------------  Medications - No data to display            Medical Decision Making:     I, Dr. Mukesh Zhou, am the primary provider of record    80-year-old female presenting after taking a full dose of her 20 mg tablet of nadolol. She is having no symptoms. She is normotensive and EKG shows no ischemia or injury, no bradycardia. Patient has ambulated to the bathroom multiple times with no symptoms, no complaints, no difficulty. Repeat blood pressure is unchanged. She was observed in ED for over 3 hours with no changes, comfortable at discharge home and PCP follow-up. Encouraged to return for any changes in condition or new symptoms. Re-Evaluations: This patient's ED course included: a personal history and physicial examination and re-evaluation prior to disposition    This patient has remained hemodynamically stable and been closely monitored during their ED course. Counseling: The emergency provider has spoken with the patient and discussed todays results, in addition to providing specific details for the plan of care and counseling regarding the diagnosis and prognosis.   Questions are answered at this time and they are agreeable with the plan.       --------------------------------- IMPRESSION AND DISPOSITION ---------------------------------    IMPRESSION  1. Accidental medication error, initial encounter        DISPOSITION  Disposition: Discharge to home  Patient condition is stable        NOTE: This report was transcribed using voice recognition software.  Every effort was made to ensure accuracy; however, inadvertent computerized transcription errors may be present        Awa Logan DO  10/02/21 [id number]

## 2021-10-03 LAB
EKG ATRIAL RATE: 300 BPM
EKG Q-T INTERVAL: 344 MS
EKG QRS DURATION: 58 MS
EKG QTC CALCULATION (BAZETT): 416 MS
EKG R AXIS: 138 DEGREES
EKG T AXIS: 43 DEGREES
EKG VENTRICULAR RATE: 88 BPM

## 2021-10-03 PROCEDURE — 93010 ELECTROCARDIOGRAM REPORT: CPT | Performed by: INTERNAL MEDICINE

## 2022-03-31 ENCOUNTER — OFFICE VISIT (OUTPATIENT)
Dept: FAMILY MEDICINE CLINIC | Age: 82
End: 2022-03-31
Payer: MEDICARE

## 2022-03-31 VITALS
BODY MASS INDEX: 23.72 KG/M2 | WEIGHT: 113 LBS | HEIGHT: 58 IN | OXYGEN SATURATION: 97 % | TEMPERATURE: 97.8 F | RESPIRATION RATE: 16 BRPM | DIASTOLIC BLOOD PRESSURE: 78 MMHG | HEART RATE: 55 BPM | SYSTOLIC BLOOD PRESSURE: 126 MMHG

## 2022-03-31 DIAGNOSIS — S81.812A LEG LACERATION, LEFT, INITIAL ENCOUNTER: Primary | ICD-10-CM

## 2022-03-31 PROCEDURE — 99213 OFFICE O/P EST LOW 20 MIN: CPT

## 2022-03-31 RX ORDER — SULFAMETHOXAZOLE AND TRIMETHOPRIM 800; 160 MG/1; MG/1
1 TABLET ORAL 2 TIMES DAILY
Qty: 20 TABLET | Refills: 0 | Status: SHIPPED | OUTPATIENT
Start: 2022-03-31 | End: 2022-04-10

## 2022-03-31 NOTE — PROGRESS NOTES
Chief Complaint       Wound Check (lt lower extremity since yesterday  / dog jumped off lap and accidentally scraped leg )    History of Present Illness   Source of history provided by:  patient. Christina Gil is a 80 y.o. old female presenting to the walk in clinic for a laceration to the left lower leg, caused by dog jumping off of her lap and scraping her leg, which occurred yesterday afternoon. Patient immediately rinsed area with hydrogen peroxide and soapy water. Pt states bleeding is controlled and there is no N/T to the site. The patients tetanus status is up to date (2018). Pt denies any significant pain at the site, loss of function to the area, fever, chills, lethargy, N/V, or syncope. Patient on Eliquis. ROS    Unless otherwise stated in this report or unable to obtain because of the patient's clinical or mental status as evidenced by the medical record, this patients's positive and negative responses for Review of Systems, constitutional, psych, eyes, ENT, cardiovascular, respiratory, gastrointestinal, neurological, genitourinary, musculoskeletal, integument systems and systems related to the presenting problem are either stated in the preceding or were not pertinent or were negative for the symptoms and/or complaints related to the medical problem. Physical Exam         VS:  /78   Pulse 55   Temp 97.8 °F (36.6 °C) (Temporal)   Resp 16   Ht 4' 10\" (1.473 m)   Wt 113 lb (51.3 kg)   SpO2 97%   BMI 23.62 kg/m²    Oxygen Saturation Interpretation: Normal.    Constitutional:  Alert, development consistent with age. Chest: Heart RRR without pathologic murmurs or gallops. Lungs CTAB without W/R/R. Skin: 6cm laceration to the medial left shin noted. Skin macerated in this area. Multiple small puncture wounds noted throughout lower left leg, healing appropriately. No surrounding erythema, warmth, or lymphatic streaking. No current active bleeding.  Wound explored extensively and no FB present. No tendon laceration noted. FROM without deficits or weakness. Distal sensation intact. Cap refill less then 2 sec. Lymphatics: No lymphangitis or adenopathy noted. Neurological:  Alert and oriented. Motor functions intact. Lab / Imaging Results   (All laboratory and radiology results have been personally reviewed by myself)  Labs:  No results found for this visit on 03/31/22. Imaging: All Radiology results interpreted by Radiologist unless otherwise noted. Assessment / Plan     Impression(s):  Jalil Saldana was seen today for wound check. Diagnoses and all orders for this visit:    Leg laceration, left, initial encounter  -     sulfamethoxazole-trimethoprim (BACTRIM DS;SEPTRA DS) 800-160 MG per tablet; Take 1 tablet by mouth 2 times daily for 10 days  -     mupirocin (BACTROBAN) 2 % ointment; Apply topically 3 times daily. Disposition:  Disposition: Discharge to home. Laceration was not repaired due to cause and duration of injury. Laceration healing appropriately and no active bleeding noted. Wound was irrigated with normal saline and cleansed with iodine swabs. Bacitracin ointment applied along with sterile dressing. Patient was provided materials to dress wound at home. Script for Bactrim and mupriocin provided. Advised rest of the affected area to prevent dehiscence. Wound care measures discussed at length. Advised to follow up with PCP next week if no improvement. ED sooner if symptoms worsen or change. ED immediately with signs of secondary infection including surrounding erythema, swelling, increased tenderness, or purulent discharge. ED with any weakness, paresthesias, or uncontrolled bleeding. Pt states understanding and is in agreement with this care plan. All questions answered. HAILEY You    **This report was transcribed using voice recognition software.  Every effort was made to ensure accuracy; however, inadvertent computerized transcription errors may be present.

## 2022-04-11 DIAGNOSIS — S81.812A LEG LACERATION, LEFT, INITIAL ENCOUNTER: ICD-10-CM

## 2022-06-20 ENCOUNTER — OFFICE VISIT (OUTPATIENT)
Dept: FAMILY MEDICINE CLINIC | Age: 82
End: 2022-06-20
Payer: MEDICARE

## 2022-06-20 ENCOUNTER — HOSPITAL ENCOUNTER (OUTPATIENT)
Age: 82
Setting detail: OBSERVATION
Discharge: HOME | End: 2022-06-22
Attending: EMERGENCY MEDICINE | Admitting: FAMILY MEDICINE
Payer: MEDICARE

## 2022-06-20 ENCOUNTER — APPOINTMENT (OUTPATIENT)
Dept: CT IMAGING | Age: 82
End: 2022-06-20
Payer: MEDICARE

## 2022-06-20 VITALS
RESPIRATION RATE: 18 BRPM | DIASTOLIC BLOOD PRESSURE: 70 MMHG | BODY MASS INDEX: 23.51 KG/M2 | HEIGHT: 58 IN | OXYGEN SATURATION: 98 % | TEMPERATURE: 97 F | SYSTOLIC BLOOD PRESSURE: 124 MMHG | HEART RATE: 91 BPM | WEIGHT: 112 LBS

## 2022-06-20 DIAGNOSIS — S22.000A COMPRESSION FRACTURE OF THORACIC VERTEBRA, UNSPECIFIED THORACIC VERTEBRAL LEVEL, INITIAL ENCOUNTER (HCC): Primary | ICD-10-CM

## 2022-06-20 DIAGNOSIS — T14.8XXA MUSCLE STRAIN: ICD-10-CM

## 2022-06-20 DIAGNOSIS — M54.2 NECK PAIN: ICD-10-CM
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
## 2022-06-20 DIAGNOSIS — S09.90XA INJURY OF HEAD, INITIAL ENCOUNTER: ICD-10-CM

## 2022-06-20 DIAGNOSIS — R51.9 ACUTE NONINTRACTABLE HEADACHE, UNSPECIFIED HEADACHE TYPE: ICD-10-CM

## 2022-06-20 DIAGNOSIS — S16.1XXA ACUTE STRAIN OF NECK MUSCLE, INITIAL ENCOUNTER: ICD-10-CM
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
## 2022-06-20 DIAGNOSIS — S39.012A STRAIN OF LUMBAR REGION, INITIAL ENCOUNTER: ICD-10-CM

## 2022-06-20 DIAGNOSIS — S09.90XA CLOSED HEAD INJURY, INITIAL ENCOUNTER: ICD-10-CM

## 2022-06-20 PROCEDURE — 1036F TOBACCO NON-USER: CPT | Performed by: NURSE PRACTITIONER

## 2022-06-20 PROCEDURE — G8420 CALC BMI NORM PARAMETERS: HCPCS | Performed by: NURSE PRACTITIONER

## 2022-06-20 PROCEDURE — 72125 CT NECK SPINE W/O DYE: CPT

## 2022-06-20 PROCEDURE — 99285 EMERGENCY DEPT VISIT HI MDM: CPT

## 2022-06-20 PROCEDURE — 1090F PRES/ABSN URINE INCON ASSESS: CPT | Performed by: NURSE PRACTITIONER

## 2022-06-20 PROCEDURE — 70450 CT HEAD/BRAIN W/O DYE: CPT

## 2022-06-20 PROCEDURE — G0378 HOSPITAL OBSERVATION PER HR: HCPCS

## 2022-06-20 PROCEDURE — 99214 OFFICE O/P EST MOD 30 MIN: CPT | Performed by: NURSE PRACTITIONER

## 2022-06-20 PROCEDURE — 72128 CT CHEST SPINE W/O DYE: CPT

## 2022-06-20 PROCEDURE — G8427 DOCREV CUR MEDS BY ELIG CLIN: HCPCS | Performed by: NURSE PRACTITIONER

## 2022-06-20 PROCEDURE — 1123F ACP DISCUSS/DSCN MKR DOCD: CPT | Performed by: NURSE PRACTITIONER

## 2022-06-20 PROCEDURE — 72131 CT LUMBAR SPINE W/O DYE: CPT

## 2022-06-20 PROCEDURE — G8400 PT W/DXA NO RESULTS DOC: HCPCS | Performed by: NURSE PRACTITIONER

## 2022-06-20 ASSESSMENT — PAIN DESCRIPTION - DESCRIPTORS: DESCRIPTORS: ACHING

## 2022-06-20 ASSESSMENT — PAIN - FUNCTIONAL ASSESSMENT: PAIN_FUNCTIONAL_ASSESSMENT: 0-10

## 2022-06-20 ASSESSMENT — PAIN SCALES - GENERAL: PAINLEVEL_OUTOF10: 10

## 2022-06-20 ASSESSMENT — PAIN DESCRIPTION - LOCATION: LOCATION: BACK;HEAD;NECK

## 2022-06-20 NOTE — PROGRESS NOTES
22  Monica Riding : 1940 Sex: female  Age 80 y.o. Subjective:  Chief Complaint   Patient presents with    Headache     x 3 days / fell and hit RT side of head  last Tuesday / had aches and pains all night        HPI:   Monica Lopez , 80 y.o. female presents to the clinic for evaluation of headache x 3 days. The patient also states neck pain The patient reports tripping and falling hitting head on pavement 2 days prior. The patient has not taken any treatment for symptoms. The patient reports worsening symptoms over time. The patient denies any other r/t injury to fall,  LOC, vision changes, nausea / vomiting, skull hematoma, and change in mental status. The patient denies clonic movement and loss of strength / sensation of extremities. The patient also denies loose teeth, fever, chest pain, abdominal pain, shortness of breath, and diarrhea. ROS:   Unless otherwise stated in this report the patient's positive and negative responses for review of systems for constitutional, eyes, ENT, cardiovascular, respiratory, gastrointestinal, neurological, , musculoskeletal, and integument systems and related systems to the presenting problem are either stated in the history of present illness or were not pertinent or were negative for the symptoms and/or complaints related to the presenting medical problem. Positives and pertinent negatives as per HPI. All others reviewed and are negative. PMH:     Past Medical History:   Diagnosis Date    Atrial fibrillation [facility])        Past Surgical History:   Procedure Laterality Date    UPPER GASTROINTESTINAL ENDOSCOPY  2017       History reviewed. No pertinent family history. Medications:     Current Outpatient Medications:     mupirocin (BACTROBAN) 2 % ointment, Apply topically 3 times daily. , Disp: 15 g, Rfl: 0    atorvastatin (LIPITOR) 20 MG tablet, Take 1 tablet by mouth daily, Disp: 30 tablet, Rfl: 3    lisinopril (PRINIVIL;ZESTRIL) 10 MG tablet, Take 1 tablet by mouth daily, Disp: 30 tablet, Rfl: 3    dilTIAZem (CARDIZEM CD) 120 MG extended release capsule, Take 1 capsule by mouth daily, Disp: 30 capsule, Rfl: 3    furosemide (LASIX) 20 MG tablet, Take 1 tablet by mouth three times a week Mon--Sat only, Disp: 60 tablet, Rfl: 3    Tiotropium Bromide-Olodaterol (STIOLTO RESPIMAT) 2.5-2.5 MCG/ACT AERS, Inhale 2 actuation into the lungs daily, Disp: 1 Inhaler, Rfl: 0    albuterol sulfate HFA (PROVENTIL HFA) 108 (90 Base) MCG/ACT inhaler, Inhale 2 puffs into the lungs every 6 hours as needed for Wheezing, Disp: 1 Inhaler, Rfl: 3    ferrous sulfate 325 (65 Fe) MG tablet, Take 325 mg by mouth daily (with breakfast) , Disp: , Rfl:     NADOLOL PO, Take 5 mg by mouth daily , Disp: , Rfl:     potassium chloride (KLOR-CON M) 20 MEQ extended release tablet, Take 20 mEq by mouth daily, Disp: , Rfl:     alendronate (FOSAMAX) 70 MG tablet, Take 70 mg by mouth every 7 days Sundays, Disp: , Rfl:     apixaban (ELIQUIS) 5 MG TABS tablet, Take 2.5 mg by mouth 2 times daily , Disp: , Rfl:     Allergies: Allergies   Allergen Reactions    Dye [Iodides]     Aleve [Naproxen]        Social History:     Social History     Tobacco Use    Smoking status: Former Smoker     Packs/day: 1.50     Years: 20.00     Pack years: 30.00     Types: Cigarettes     Start date: 12/15/1961     Quit date: 12/15/1982     Years since quittin.5    Smokeless tobacco: Never Used   Substance Use Topics    Alcohol use: Yes     Comment: SOCIALLY    Drug use: No       Patient lives at home. Physical Exam:     Vitals:    22 1426   BP: 124/70   Site: Left Upper Arm   Position: Sitting   Cuff Size: Medium Adult   Pulse: 91   Resp: 18   Temp: 97 °F (36.1 °C)   TempSrc: Temporal   SpO2: 98%   Weight: 112 lb (50.8 kg)   Height: 4' 10\" (1.473 m)       Physical Exam (PE)    Physical Exam  Constitutional:       Appearance: Normal appearance.    HENT: Head: Normocephalic. Right Ear: External ear normal. There is impacted cerumen. Left Ear: Tympanic membrane, ear canal and external ear normal.      Nose: Nose normal.      Mouth/Throat:      Mouth: Mucous membranes are moist.      Pharynx: Oropharynx is clear. Eyes:      Extraocular Movements: Extraocular movements intact. Pupils: Pupils are equal, round, and reactive to light. Cardiovascular:      Rate and Rhythm: Normal rate and regular rhythm. Pulses: Normal pulses. Heart sounds: Normal heart sounds. Pulmonary:      Effort: Pulmonary effort is normal.      Breath sounds: Normal breath sounds. Abdominal:      General: Bowel sounds are normal.      Palpations: Abdomen is soft. Musculoskeletal:         General: Normal range of motion. Cervical back: Normal range of motion and neck supple. Tenderness present. No rigidity. Lymphadenopathy:      Cervical: No cervical adenopathy. Skin:     General: Skin is warm and dry. Capillary Refill: Capillary refill takes less than 2 seconds. Neurological:      General: No focal deficit present. Mental Status: She is alert and oriented to person, place, and time. Cranial Nerves: No cranial nerve deficit. Sensory: No sensory deficit. Motor: No weakness. Coordination: Coordination normal.      Gait: Gait normal.   Psychiatric:         Mood and Affect: Mood normal.         Behavior: Behavior normal.          Testing:   (All laboratory and radiology results have been personally reviewed by myself)  Labs:  No results found for this visit on 06/20/22. Imaging: All Radiology results interpreted by Radiologist unless otherwise noted. No orders to display       Assessment / Plan:   The patient's vitals, allergies, medications, and past medical history have been reviewed. Naz Romeo was seen today for headache.     Diagnoses and all orders for this visit:    Fall, initial encounter    Injury of head, initial encounter    Neck pain    Acute nonintractable headache, unspecified headache type        - Disposition: ED    - Educational material printed for patient's review and were included in patient instructions. After Visit Summary and given to patient at the end of visit. - Pt advised that a comprehensive workup is unable to be performed in an ready care setting. Pt advised to go straight to the ED for further evaluation and management. Pt agreed with this care plan and agreed to go immediately by private vehicle. Pt left our office in stable condition. Further disposition to follow. All questions answered. SIGNATURE: MARKOS Quezada Mai-WESLEY    *NOTE: This report was transcribed using voice recognition software. Every effort was made to ensure accuracy; however, inadvertent computerized transcription errors may be present.

## 2022-06-20 NOTE — PATIENT INSTRUCTIONS
Patient Education        Learning About a Closed Head Injury  What is a closed head injury? A closed head injury happens when your head gets hit hard. The strong force of the blow causes your brain to shake in your skull. This movement can cause the brain to bruise, swell, or tear. Sometimes nerves or blood vessels also getdamaged. This can cause bleeding in or around the brain. A concussion is a type of closed head injury. What are the symptoms? If you have a mild concussion, you may have a mild headache or feel \"not quite right. \" These symptoms are common. They usually go away over a few days to 4weeks. But sometimes after a concussion, you feel like you can't function as well as before the injury. And you have new symptoms. This is called postconcussivesyndrome. You may:   Find it harder to solve problems, think, concentrate, or remember.  Have headaches.  Have changes in your sleep patterns, such as not being able to sleep or sleeping all the time.  Have changes in your personality.  Not be interested in your usual activities.  Feel angry or anxious without a clear reason.  Lose your sense of taste or smell.  Be dizzy, lightheaded, or unsteady. It may be hard to stand or walk. How is a closed head injury treated? Any person who may have a concussion needs to see a doctor. Some people have to stay in the hospital to be watched. Others can go home safely. If you go home, follow your doctor's instructions. He or she will tell you if you need someoneto watch you closely for the next 24 hours or longer. Rest is the best treatment. Get plenty of sleep at night. And try to [address] the day.  Avoid activities that are physically or mentally demanding. These include housework, exercise, and schoolwork. And don't play video games, send text messages, or use the computer. You may need to change your school or work schedule to be able to avoid these activities.    Ask your doctor when it's okay to drive, ride a bike, or operate machinery.  Take an over-the-counter pain medicine, such as acetaminophen (Tylenol), ibuprofen (Advil, Motrin), or naproxen (Aleve). Be safe with medicines. Read and follow all instructions on the label.  Check with your doctor before you use any other medicines for pain.  Do not drink alcohol or use illegal drugs. They can slow recovery. They can also increase your risk of getting a second head injury. Follow-up care is a key part of your treatment and safety. Be sure to make and go to all appointments, and call your doctor if you are having problems. It's also a good idea to know your test results and keep alist of the medicines you take. Where can you learn more? Go to https://[employer]Turnaround[employer]. org and sign in to your [employer] account. Enter [id number] [id number] [phone] in the [employer] box to learn more about \"Learning About a Closed Head Injury. \"     If you do not have an account, please click on the \"Sign Up Now\" link. Current as of: December 13, 2021               Content Version: 13.2  © 1302-4984 [employer]. Care instructions adapted under license by [facility] [address] If you have questions about a medical condition or this instruction, always ask your healthcare professional. [address] any warranty or liability for your use of this information.

## 2022-06-20 NOTE — ED PROVIDER NOTES
ED Physician   HPI:  6/20/22, Time: 6:11 PM EDT         Dylan Bustamante is a 80 y.o. female presenting to the ED for fall with injury over 1 week ago. Patient presents to the emergency department after having a mechanical fall last Tuesday. 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 Patient reports that she was outside and she stepped up onto one of her steps but her footing was not correct causing her to fall backwards. Patient reports that she landed primarily on her right side she does report hitting her head, there was no loss of consciousness. Patient reports that she is on Eliquis. She did go to the local urgent care clinic and she was advised here to come to the emergency department for CAT scan of the brain. She does report pain primarily to the back of her head right posterior parietal aspect as well as right lateral lumbar sacral region. She does reports feeling sore all over. She denies any injuries to her upper or lower extremities. She denies having any unusual nausea or vomiting or any confusion or change noted in gait as well as no unusual bruising or bleeding. Patient also has not had any saddle anesthesia or any unexplained incontinence. Patient does complain of head pain. No visual disturbance noted either. Patient reports otherwise normal state of health. She denied feeling weak or dizzy prior to this fall just not putting her foot fully on the step when she was walking up it causing her to fall backwards. Patient reports otherwise no noted chest pain, shortness of breath, abdominal pain and no noted nausea, vomiting or diarrhea. Symptoms moderate in severity and persistent.     Review of Systems:   A complete review of systems was performed and pertinent positives and negatives are stated within HPI, all other systems reviewed and are negative.          --------------------------------------------- PAST HISTORY ---------------------------------------------  Past Medical History:  has a past medical history of Atrial fibrillation ([facility] [address] Past Surgical History:  has a past surgical history that includes Upper gastrointestinal endoscopy (07/25/2017). Social History:  reports that she quit smoking about 39 years ago. Her smoking use included cigarettes. She started smoking about 60 years ago. She has a 30.00 pack-year smoking history. She has never used smokeless tobacco. She reports current alcohol use. She reports that she does not use drugs. Family History: family history is not on file. The patients home medications have been reviewed. Allergies: Dye [iodides] and Aleve [naproxen]    -------------------------------------------------- RESULTS -------------------------------------------------  All laboratory and radiology results have been personally reviewed by myself   LABS:  No results found for this visit on 06/20/22. RADIOLOGY:  Interpreted by Radiologist.  [address] [place] [address]   Final Result   CT OF THE BRAIN:      1. No skull fracture or acute intracranial abnormality. CT OF THE CERVICAL SPINE:      1. No fracture or joint dislocation is seen. 2. Degenerative changes, as described. CT OF THE THORACIC SPINE:      1. Age-indeterminate compression fracture deformities of the T6, T7 and T8   vertebral bodies. If indicated, MRI may be obtained for further evaluation. 2. Chronic compression fracture deformity with kyphoplasty changes at T12.   3. Demineralized bones. CT OF THE LUMBAR SPINE:      1. Chronic compression fracture deformities of the L1 and L2 bodies with   kyphoplasty changes in the former. 2. Degenerative changes, as described. 3. Cholelithiasis. :   Unavailable         CT CERVICAL SPINE WO CONTRAST   Final Result   CT OF THE BRAIN:      1. No skull fracture or acute intracranial abnormality. CT OF THE CERVICAL SPINE:      1. No fracture or joint dislocation is seen. 2. Degenerative changes, as described. CT OF THE THORACIC SPINE:      1. Age-indeterminate compression fracture deformities of the T6, T7 and T8   vertebral bodies. If indicated, MRI may be obtained for further evaluation. 2. Chronic compression fracture deformity with kyphoplasty changes at T12.   3. Demineralized bones. CT OF THE LUMBAR SPINE:      1. Chronic compression fracture deformities of the L1 and L2 bodies with   kyphoplasty changes in the former. 2. Degenerative changes, as described. 3. Cholelithiasis. :   Unavailable         CT LUMBAR SPINE WO CONTRAST   Final Result   CT OF THE BRAIN:      1. No skull fracture or acute intracranial abnormality. CT OF THE CERVICAL SPINE:      1. No fracture or joint dislocation is seen. 2. Degenerative changes, as described. CT OF THE THORACIC SPINE:      1. Age-indeterminate compression fracture deformities of the T6, T7 and T8   vertebral bodies. If indicated, MRI may be obtained for further evaluation. 2. Chronic compression fracture deformity with kyphoplasty changes at T12.   3. Demineralized bones. CT OF THE LUMBAR SPINE:      1. Chronic compression fracture deformities of the L1 and L2 bodies with   kyphoplasty changes in the former. 2. Degenerative changes, as described. 3. Cholelithiasis. :   Unavailable         CT THORACIC SPINE WO CONTRAST   Final Result   CT OF THE BRAIN:      1. No skull fracture or acute intracranial abnormality. CT OF THE CERVICAL SPINE:      1. No fracture or joint dislocation is seen. 2. Degenerative changes, as described. CT OF THE THORACIC SPINE:      1. Age-indeterminate compression fracture deformities of the T6, T7 and T8   vertebral bodies. If indicated, MRI may be obtained for further evaluation. 2. Chronic compression fracture deformity with kyphoplasty changes at T12.   3. Demineralized bones. CT OF THE LUMBAR SPINE:      1.  Chronic compression fracture deformities of the L1 and L2 bodies with   kyphoplasty changes in the former. 2. Degenerative changes, as described. 3. Cholelithiasis. Chris Choi             ------------------------- NURSING NOTES AND VITALS REVIEWED ---------------------------   The nursing notes within the ED encounter and vital signs as below have been reviewed. BP (!) 124/91   Pulse 98   Temp 97.5 °F (36.4 °C) (Temporal)   Resp 16   Ht 4' 10\" (1.473 m)   Wt 112 lb (50.8 kg)   SpO2 94%   BMI 23.41 kg/m²   Oxygen Saturation Interpretation: Normal      ---------------------------------------------------PHYSICAL EXAM--------------------------------------      Constitutional/General: Alert and oriented x3, well appearing, non toxic in NAD  Head: Normocephalic and atraumatic  Eyes: PERRL, EOMI  Mouth: Oropharynx clear, handling secretions, no trismus  Neck: Supple, full ROM,   Pulmonary: Lungs clear to auscultation bilaterally, no wheezes, rales, or rhonchi. Not in respiratory distress  Cardiovascular:  Regular rate and rhythm, no murmurs, gallops, or rubs. 2+ distal pulses  Abdomen: Soft, non tender, non distended,   Extremities: Moves all extremities x 4. Warm and well perfused, cervical, thoracic, lumbar sacral spine are midline. No step-off noted. The patient did express point tenderness to all areas palpated stating it was more of a soreness. On exam for point tenderness was most noticeable to the thoracic region. No internal/external rotation no shortening noted. Skin: warm and dry without rash  Neurologic: GCS 15, cranial nerves II through XII grossly intact. No acute neurovascular deficit noted. Speech clear and coherent strength strong and equal bilaterally  Psych: Normal Affect      ------------------------------ ED COURSE/MEDICAL DECISION MAKING----------------------  Medications - No data to display      ED COURSE:       Medical Decision Making:    Plan be for imaging. Patient declines wanting anything for pain meds.   Imaging results CT scan of brain showing no skull fracture or any acute intercranial abnormality. CT cervical spine no fracture or any joint dislocation. CT lumbar spine does show chronic compression fracture deformities of L1 and 2 with kyphoplasty. Otherwise degenerative changes and cholelithiasis. CT thoracic spine does show age-indeterminate compression fracture deformities of T6, T7 and T8 vertebral bodies. She does have a chronic compression fracture deformity with kyphoplasty of T12. Patient reports having kyphoplasties done in [place] years ago after a fall. Patient with notable point tenderness to mid thoracic spine. Call out to neurosurgery. I did speak with neurosurgeon, Dr. Gopi Obando, she was made aware of imaging and presenting complaint, she reports that the patient will need a TLSO brace and pain control. And that she will see her first thing in the a.m. I did call patient's primary care doctor and made him aware, he is agreeable to admit patient to medical surgical.  Patient is declining wanting anything for pain. She is neurovascularly intact she is able to ambulate easily and independently without any difficulty she was made aware of all results and there is concern for compression fracture of thoracic spine but unclear if this could be old or versus the fall from 1 week ago. Patient otherwise will be admitted to medical surgical observation. Patient expressed understanding. Patient resting comfortably. Counseling: The emergency provider has spoken with the patient and discussed todays results, in addition to providing specific details for the plan of care and counseling regarding the diagnosis and prognosis. Questions are answered at this time and they are agreeable with the plan.      --------------------------------- IMPRESSION AND DISPOSITION ---------------------------------    IMPRESSION  1.  Compression fracture of thoracic vertebra, unspecified thoracic vertebral level, initial encounter ([facility] [address]    2. Closed head injury, initial encounter    3. Fall, initial encounter    4. Acute strain of neck muscle, initial encounter    5. Strain of lumbar region, initial encounter    6. Muscle strain        DISPOSITION  Disposition:-Medical surgical admission  Patient condition is good      NOTE: This report was transcribed using voice recognition software. Every effort was made to ensure accuracy; however, inadvertent computerized transcription errors may be present     MAKROS Tyalor - CNP  06/20/22 [id number]    ATTENDING PROVIDER ATTESTATION:     I have personally performed and/or participated in the history, exam, medical decision making, and procedures and agree with all pertinent clinical information. I have also reviewed and agree with the past medical, family and social history unless otherwise noted. My findings/Plan: Patient presenting here because of fall about a week ago. Patient reporting some back pain. Patient reports that she was outside and stepped and lost her footing. Patient did strike her head she does complain of some mild headache she does report some lower back pain. Patient reporting no abdominal pain or chest pain there is no numbness or tingling. Patient reporting no upper or lower extremity weakness. Patient here is awake alert orient x3 heart lung exam normal abdomen is soft nontender she does have tenderness to her lumbar spine as well as lower thoracic. Patient able to move all extremities. She has tenderness to her posterior scalp. Patient had imaging done and noted reviewed. We did speak to neurosurgery they recommend admission for further evaluation and treatment. Patient was made aware of findings and plan. We did speak to on-call internal medicine. Patient will be admitted.        Milton Fontanez MD  06/20/22 [facility] Reddy Arango MD  06/20/22 4762

## 2022-06-21 ENCOUNTER — APPOINTMENT (OUTPATIENT)
Dept: MRI IMAGING | Age: 82
End: 2022-06-21
Payer: MEDICARE

## 2022-06-21 PROCEDURE — 6360000002 HC RX W HCPCS: Performed by: FAMILY MEDICINE

## 2022-06-21 PROCEDURE — 97165 OT EVAL LOW COMPLEX 30 MIN: CPT

## 2022-06-21 PROCEDURE — G0378 HOSPITAL OBSERVATION PER HR: HCPCS

## 2022-06-21 PROCEDURE — 72146 MRI CHEST SPINE W/O DYE: CPT

## 2022-06-21 PROCEDURE — 94664 DEMO&/EVAL PT USE INHALER: CPT

## 2022-06-21 PROCEDURE — 97535 SELF CARE MNGMENT TRAINING: CPT

## 2022-06-21 PROCEDURE — 97530 THERAPEUTIC ACTIVITIES: CPT

## 2022-06-21 PROCEDURE — 6370000000 HC RX 637 (ALT 250 FOR IP): Performed by: FAMILY MEDICINE

## 2022-06-21 PROCEDURE — 97161 PT EVAL LOW COMPLEX 20 MIN: CPT

## 2022-06-21 PROCEDURE — 99204 OFFICE O/P NEW MOD 45 MIN: CPT | Performed by: NEUROLOGICAL SURGERY

## 2022-06-21 PROCEDURE — 6370000000 HC RX 637 (ALT 250 FOR IP): Performed by: STUDENT IN AN ORGANIZED HEALTH CARE EDUCATION/TRAINING PROGRAM

## 2022-06-21 PROCEDURE — 94640 AIRWAY INHALATION TREATMENT: CPT

## 2022-06-21 RX ORDER — FERROUS SULFATE 325(65) MG
325 TABLET ORAL
Status: DISCONTINUED | OUTPATIENT
Start: 2022-06-21 | End: 2022-06-22 | Stop reason: HOSPADM

## 2022-06-21 RX ORDER — LORAZEPAM 0.5 MG/1
0.5 TABLET ORAL ONCE
Status: COMPLETED | OUTPATIENT
Start: 2022-06-21 | End: 2022-06-21

## 2022-06-21 RX ORDER — NADOLOL 20 MG/1
5 TABLET ORAL DAILY
Status: DISCONTINUED | OUTPATIENT
Start: 2022-06-21 | End: 2022-06-22 | Stop reason: HOSPADM

## 2022-06-21 RX ORDER — ALBUTEROL SULFATE 2.5 MG/3ML
2.5 SOLUTION RESPIRATORY (INHALATION) EVERY 6 HOURS PRN
Status: DISCONTINUED | OUTPATIENT
Start: 2022-06-21 | End: 2022-06-22 | Stop reason: HOSPADM

## 2022-06-21 RX ORDER — POTASSIUM CHLORIDE 20 MEQ/1
20 TABLET, EXTENDED RELEASE ORAL DAILY
Status: DISCONTINUED | OUTPATIENT
Start: 2022-06-21 | End: 2022-06-22 | Stop reason: HOSPADM

## 2022-06-21 RX ORDER — ROSUVASTATIN CALCIUM 10 MG/1
10 TABLET, COATED ORAL DAILY
COMMUNITY

## 2022-06-21 RX ORDER — ARFORMOTEROL TARTRATE 15 UG/2ML
15 SOLUTION RESPIRATORY (INHALATION) 2 TIMES DAILY
Status: DISCONTINUED | OUTPATIENT
Start: 2022-06-21 | End: 2022-06-22 | Stop reason: HOSPADM

## 2022-06-21 RX ORDER — ROSUVASTATIN CALCIUM 10 MG/1
10 TABLET, COATED ORAL DAILY
Status: DISCONTINUED | OUTPATIENT
Start: 2022-06-21 | End: 2022-06-22 | Stop reason: HOSPADM

## 2022-06-21 RX ORDER — ALENDRONATE SODIUM 70 MG/1
70 TABLET ORAL
Status: DISCONTINUED | OUTPATIENT
Start: 2022-06-21 | End: 2022-06-21 | Stop reason: SDUPTHER

## 2022-06-21 RX ORDER — FUROSEMIDE 20 MG/1
20 TABLET ORAL
Status: DISCONTINUED | OUTPATIENT
Start: 2022-06-21 | End: 2022-06-22 | Stop reason: HOSPADM

## 2022-06-21 RX ORDER — LISINOPRIL 10 MG/1
10 TABLET ORAL DAILY
Status: DISCONTINUED | OUTPATIENT
Start: 2022-06-21 | End: 2022-06-22 | Stop reason: HOSPADM

## 2022-06-21 RX ORDER — DILTIAZEM HYDROCHLORIDE 120 MG/1
120 CAPSULE, COATED, EXTENDED RELEASE ORAL DAILY
Status: DISCONTINUED | OUTPATIENT
Start: 2022-06-21 | End: 2022-06-22 | Stop reason: HOSPADM

## 2022-06-21 RX ADMIN — FERROUS SULFATE TAB 325 MG (65 MG ELEMENTAL FE) 325 MG: 325 (65 FE) TAB at 11:17

## 2022-06-21 RX ADMIN — ROSUVASTATIN CALCIUM 10 MG: 10 TABLET, FILM COATED ORAL at 11:17

## 2022-06-21 RX ADMIN — LORAZEPAM 0.5 MG: 0.5 TABLET ORAL at 18:57

## 2022-06-21 RX ADMIN — IPRATROPIUM BROMIDE 0.5 MG: 0.5 SOLUTION RESPIRATORY (INHALATION) at 19:20

## 2022-06-21 RX ADMIN — FUROSEMIDE 20 MG: 20 TABLET ORAL at 11:17

## 2022-06-21 RX ADMIN — LISINOPRIL 10 MG: 20 TABLET ORAL at 11:18

## 2022-06-21 RX ADMIN — IPRATROPIUM BROMIDE 0.5 MG: 0.5 SOLUTION RESPIRATORY (INHALATION) at 15:31

## 2022-06-21 RX ADMIN — APIXABAN 2.5 MG: 2.5 TABLET, FILM COATED ORAL at 11:16

## 2022-06-21 RX ADMIN — APIXABAN 2.5 MG: 2.5 TABLET, FILM COATED ORAL at 23:13

## 2022-06-21 RX ADMIN — NADOLOL 5 MG: 20 TABLET ORAL at 11:17

## 2022-06-21 RX ADMIN — DILTIAZEM HYDROCHLORIDE 120 MG: 120 CAPSULE, COATED, EXTENDED RELEASE ORAL at 11:17

## 2022-06-21 RX ADMIN — POTASSIUM CHLORIDE 20 MEQ: 20 TABLET, EXTENDED RELEASE ORAL at 11:18

## 2022-06-21 RX ADMIN — IPRATROPIUM BROMIDE 0.5 MG: 0.5 SOLUTION RESPIRATORY (INHALATION) at 11:10

## 2022-06-21 RX ADMIN — ARFORMOTEROL TARTRATE 15 MCG: 15 SOLUTION RESPIRATORY (INHALATION) at 11:10

## 2022-06-21 RX ADMIN — ARFORMOTEROL TARTRATE 15 MCG: 15 SOLUTION RESPIRATORY (INHALATION) at 19:20

## 2022-06-21 ASSESSMENT — PAIN SCALES - GENERAL: PAINLEVEL_OUTOF10: 0

## 2022-06-21 ASSESSMENT — ENCOUNTER SYMPTOMS
TROUBLE SWALLOWING: 0
PHOTOPHOBIA: 0
BACK PAIN: 1
SHORTNESS OF BREATH: 0
ABDOMINAL PAIN: 0

## 2022-06-21 NOTE — CARE COORDINATION
6/21/22 Transition of Care: Patient is here observation after sustaining a fall last week and c/o back and head pain. She is alert and oriented. She is pending MRI. She is ordered and has a tlso brace. She is independent. She drives. She resides 6 months in [facility] and 6 months in [place]. Her pcp is Dr Randa Sheth and her pharmacy is [facility]. She plans on returning home at discharge. She states her friend will help her. She does not wish to talk about skilled nursing or homecare. She is concerned about her dogs and wants to return home.  Electronically signed by Razia Dove RN CM on 6/21/2022 at 1:39 PM

## 2022-06-21 NOTE — PROGRESS NOTES
Pharmacy Note    This patient was ordered Fosamax. Per the [address] [place] [address] Dr, this medication is non-formulary and not stocked by pharmacy for the reason indicated below. The medication can be reordered at discharge.      Medications in which risks outweigh benefits during hospitalization:           -  oral bisphosphonates         -  raloxifene (Evista)        -  SGLT2 inhibitors (ordered in the hospital for an indication other than heart failure or chronic kidney disease)    Medications that lack necessity during an acute hospital stay:        -  nasal antihistamines        -  nasal ipratropium 0.03% and 0.06%        -  nasal miacalcin        -  acyclovir topical cream/ointment orders for herpes labialis (cold sores)    Cyn Reaves [facility], 6/21/2022 8:31 AM

## 2022-06-21 NOTE — PROGRESS NOTES
[address]        Date:2022                                                  Patient Name: Shereen Nguyen    MRN: [id number]    : 1940    Room: [address] [place] [address]          Evaluating OT: Clarissa Santoyo OTR/L; [id number]       Referring Provider: Domenic Giles MD    Specific Provider Orders/Date: OT Eval and Treat 22      Diagnosis: Closed thoracic compression fracture    Surgery: None this admission     Pertinent Medical History:  has a past medical history of Atrial fibrillation ([facility] [address]      Recommended Adaptive Equipment:      Precautions:  Fall Risk, spinal precautions, TLSO brace     Assessment of current deficits    [x] Functional mobility  [x]ADLs  [x] Strength               []Cognition    [x] Functional transfers   [x] IADLs         [x] Safety Awareness   [x]Endurance    [] Fine Coordination              [x] Balance      [] Vision/perception   []Sensation     []Gross Motor Coordination  [] ROM  [] Delirium                   [] Motor Control     OT PLAN OF CARE   OT POC based on physician orders, patient diagnosis and results of clinical assessment    Frequency/Duration 3-5 days/wk for 2 weeks PRN   Specific OT Treatment Interventions to include:   * Instruction/training on adapted ADL techniques and AE recommendations to increase functional independence within precautions       * Training on energy conservation strategies, correct breathing pattern and techniques to improve independence/tolerance for self-care routine  * Functional transfer/mobility training/DME recommendations for increased independence, safety, and fall prevention  * Patient/Family education to increase follow through with safety techniques and functional independence  * Recommendation of environmental modifications for increased safety with functional transfers/mobility and ADLs  * Therapeutic exercise to improve motor endurance, ROM, and functional strength for ADLs/functional transfers  * Therapeutic activities to facilitate/challenge dynamic balance, stand tolerance for increased safety and independence with ADLs  * Positioning to improve skin integrity, interaction with environment and functional independence    Home Living: Pt lives alone in 1 story home with 1 step & 0 handrails to enter. Bathroom setup: Tub/shower & walk-in shower   Equipment owned: Shower chair, cane,     Prior Level of Function: IND with ADLs , IND with IADLs; engaged in functional mobility without use of  AD  Driving: Yes  Occupation: [employer]    Pain Level: Pt c/o min back pain; therapist provided repositioning   Cognition: A&O: 4/4; Follows multi step directions   Memory:  Good   Sequencing:  Fair+   Problem solving:  Fair+   Judgement/safety:  Fair     Functional Assessment:  AM-PAC Daily Activity Raw Score: 17/24   Initial Eval Status  Date: 6/21/22 Treatment Status  Date: STGs = LTGs  Time frame: 10-14 days   Feeding Independent       Grooming Minimal Assist   Independent    UB Dressing Maximal Assist to don/doff TLSO brace at bed-level   Independent    LB Dressing Minimal Assist   Independent    Bathing Minimal Assist  Independent     Toileting Moderate Assist   Independent    Bed Mobility  Log Roll: Minimal Assist  Supine to sit: Minimal Assist   Sit to supine: Minimal Assist   Supine to sit: Independent   Sit to supine: Independent    Functional Transfers Sit to stand:Minimal Assist   Stand to sit:Minimal Assist  Stand pivot: Minimal Assist  Commode: Minimal Assist  Sit to stand:Independent    Stand to sit: Independent   Stand pivot: Independent   Commode: Independent    Functional Mobility Minimal Assist  No use of AD within household distance  Independent    Balance Sitting:     Static - SBA     Dynamic - Minimal Assist  Standing: Minimal Assist  Sitting:     Static: Independent      Dynamic: Independent   Standing: Independent    Activity Tolerance Fair  Good   Visual/  Perceptual Glasses: Yes  Appears WFL        Safety Fair  Good  during ADL completion following spinal precautions     Hand Dominance Right   AROM (PROM) Strength Additional Info:  Goal:   RUE  WFL 4-/5 grossly tested good  and wfl FMC/dexterity noted during ADL tasks   Improve overall RUE strength WFL for participation in functional tasks       LUE WFL 4-/5 grossly tested Good  and wfl FMC/dexterity noted during ADL tasks   Improve overall LUE strength WFL for participation in functional tasks        Hearing: [facility]  Sensation:  No c/o numbness or tingling BUE  Tone: WFL BUE  Edema: Unremarkable    Comment: Cleared by RN to see pt. Upon arrival patient supine in bed and agreeable to OT session. At end of session, patient supine in bed with call light and phone within reach, all lines and tubes intact. Overall patient demonstrated decreased independence and safety during completion of ADL/functional transfer/mobility tasks. Therapist facilitated ADL tasks, functional transfers, functional mobility, bed mobility to address safety awareness, implementation of fall prevention strategies, & engagement throughout functional tasks. Pt educated on spinal precautions & activity modifications/adaptations to maintain skin/joint integrity & safety throughout daily activities. Pt would benefit from continued skilled OT to increase safety and independence with completion of ADL/IADL tasks for functional independence and quality of life. Treatment: OT treatment provided this date includes:    ADL-  Instruction/training on safety and adapted techniques for completion of ADLs: Pt able to void self of urine seated on commode with cuing for spinal precautions during hygiene care. Pt required assist to don pants over BLEs while maintaining spinal neutrality.  Pt required assist to don/doff TLSO brace at bed-level with assist to maintain proper body mechanics & spinal precautions.   Mobility-  Instruction/training on safety and improved independence with bed mobility/functional transfers and functional mobility. Pt required assist/cuing to maintain spinal precautions & safety awareness throughout functional transfers/mobility.   Sitting EOB ~5 minutes to improve dynamic sitting balance and activity tolerance during ADLs.   Skilled positioning/alignment-  Proper Positioning/Alignment. Pt required assist/cuing to maintain proper body mechanics & spinal neutrality throughout session to promote skin/joint integrity, safety awareness, & implementation of fall prevention strategies. Rehab Potential: Good for established goals     LTG: maximize independence with ADLs to return to PLOF    Patient and/or family were instructed on functional diagnosis, prognosis/goals and OT plan of care. Demonstrated fair understanding. Eval Complexity: Low  · History: Expanded chart review of medical records and additional review of physical, cognitive, or psychosocial history related to current functional performance  · Exam: 3+ performance deficits  · Assistance/Modification: Min/mod assistance or modifications required to perform tasks. May have comorbidities that affect occupational performance. Evaluation time includes thorough review of current medical information, gathering information on past medical & social history & PLOF, completion of standardized testing, informal observation of tasks, consultation with other medical professions/disciplines, assessment of data & development of POC/goals.      Time In: 11:45a  Time Out: 12:10p  Total Treatment Time: 10 minutes    Min Units   OT Eval Low 13346  x     OT Eval Medium 42939      OT Eval High 04646      OT Re-Eval [id number]       Therapeutic Ex 03609       Therapeutic Activities 14389       ADL/Self Care 82976  10 1    Orthotic Management [id number]       Manual [id number]     Neuro Re-Ed [id number]       Non-Billable Time Evaluation Time additionally includes thorough review of current medical information, gathering information on past medical history/social history and prior level of function, interpretation of standardized testing/informal observation of tasks, assessment of data and development of plan of care and goals.             Harlan Rodríguez OTR/L; [id number]

## 2022-06-21 NOTE — PROGRESS NOTES
Dr. Ashtyn Torerz called unit informing that the patient called his office and wanted to know if he needed to see her. This nurse asked patient if she wanted him to come, she stated \"no he likes to know when any of his patient are in the hospital, I just wanted to let him know I'm here.

## 2022-06-21 NOTE — ED NOTES
N2N given to RN on 46. All questions answered.  Placed in transport      Ryan Shaw RN  06/21/22 7565

## 2022-06-21 NOTE — PROGRESS NOTES
Physical Therapy Initial Assessment     Name: Edith Zhou  : [birth year]  MRN: [id number]      Date of Service: 2022    Evaluating PT:  João Carlson, PT, DPT [id number]    Room #:  9961/5219-T  Diagnosis:  Muscle strain [T14. 8XXA]  Closed head injury, initial encounter [S09.90XA]  Acute strain of neck muscle, initial encounter [S16. 1XXA]  Fall, initial encounter [id number]. XXXA]  Strain of lumbar region, initial encounter [Y63.727R]  Thoracic compression fracture, closed, initial encounter ([facility] [address] [S22.000A]  Compression fracture of thoracic vertebra, unspecified thoracic vertebral level, initial encounter ([facility] [address] [S22.000A]  PMHx/PSHx:    Past Medical History:   Diagnosis Date    Atrial fibrillation ([facility] [address]      Procedure/Surgery:  None this admission   Reason for admission: Thoracic compression fracture, closed, initial encounter [facility])  Precautions:  Fall risk, TLSO  Equipment Needs:  TBD    SUBJECTIVE:  Pt lives alone in single story home with no KELLY. Pt ambulated with no AD PTA. OBJECTIVE:   Initial Evaluation  Date: 22 Treatment Short Term/ Long Term   Goals   AM-PAC 6 Clicks 76/68     Was pt agreeable to Eval/treatment?  Yes     Does pt have pain? no     Bed Mobility  Rolling: min a  Supine to sit: min a  Sit to supine: min a  Scooting: min a  Rolling: ind  Supine to sit: ind  Sit to supine: ind  Scooting: ind   Transfers Sit to stand: min a  Stand to sit: min a  Stand pivot: min a  Sit to stand: ind  Stand to sit: ind  Stand pivot: ind   Ambulation    120 feet with no AD min a<>CGA  >150 feet with no AD ind   Stair negotiation: ascended and descended  NT  2 steps with hand rail ind   ROM BUE:  Refer to OT eval   BLE:  WNL     Strength BUE:  Refer to OT eval   BLE:  WNL     Balance Sitting EOB:  SBA  Dynamic Standing:  Min a <>CGA  Sitting EOB:  ind  Dynamic Standing:  ind     Pt is A & O x 4  Sensation:  Pt denies numbness and tingling to extremities  Edema:  None noted     Vitals:  Blood Pressure at rest -- Blood Pressure post session --   Heart Rate at rest - Heart Rate post session --   SPO2 at rest -- SPO2 post session --     Therapeutic Exercises:  none performed this visit    Patient education  Pt educated on spinal precautions, safety, role of PT, brace donning/doffing    Patient response to education:   Pt verbalized understanding Pt demonstrated skill Pt requires further education in this area   x x      ASSESSMENT:  Conditions Requiring Skilled Therapeutic Intervention:  [x]Decreased strength     []Decreased ROM  [x]Decreased functional mobility  [x]Decreased balance   [x]Decreased endurance   []Decreased posture  []Decreased sensation  []Decreased coordination   []Decreased vision  []Decreased safety awareness   []Increased pain       Comments:  Pt in bed on arrival, agreeable to PT/OT co-evaluation. Pt assisted with donning brace in supine and educated extensively regarding spinal precautions. Pt noted tightness in brace in chest area but notes she has less pain while wearing brace. Pt slightly impulsive today requiring cues for safety. Pt requested to ambulate to restroom upon sitting EOB and assisted to restroom with min A required for balance and pt reaching for objects during ambulation. Pt instructed to ambulate to tolerance after restroom use; able to ambulate in hallway and dynamics ability improved throughout bout of ambulation to [facility]. Pt noted some fatigue after ambulation and requested to return to bed at end of session. Pt assisted with brace doffing upon returning to supine and pt noted spinning dizziness sensation when rolling to L but not R which decreased upon laying supine. Pt left with call light in reach and all needs met at end of session. Treatment:  Patient practiced and was instructed in the following treatment:     Bed mobility: performed with cues for safety awareness and proper hand placement to promote improved functional independence.     Transfer Training: from EOB, low toilet height   Gait training: in hallway, room with no AD      Pt's/ family goals   1. Return home safely. Prognosis is good for reaching above PT goals. Patient and or family understand(s) diagnosis, prognosis, and plan of care. yes  06/21/22 0815  PT eval and treat  Start:  06/21/22 0815,   End:  06/21/22 0815,   ONE TIME,   Standing Count:  1 Occurrences,   PEPE Salamanca MD         PHYSICAL THERAPY PLAN OF CARE:    PT POC is established based on physician order and patient diagnosis     Referring provider/PT Order:    06/21/22 0815  PT eval and treat  Start:  06/21/22 0815,   End:  06/21/22 0815,   ONE TIME,   Standing Count:  1 Occurrences,   PEPE Salamanca MD       Diagnosis:  Muscle strain [T14. 8XXA]  Closed head injury, initial encounter [S09.90XA]  Acute strain of neck muscle, initial encounter [S16. 1XXA]  Fall, initial encounter [id number]. XXXA]  Strain of lumbar region, initial encounter [S39.012A]  Thoracic compression fracture, closed, initial encounter ([facility] [address] [S22.000A]  Compression fracture of thoracic vertebra, unspecified thoracic vertebral level, initial encounter ([facility] [address] [S22.000A]  Specific instructions for next treatment:  Increase ambulation distance, trial stairs     Current Treatment Recommendations:     [x] Strengthening to improve independence with functional mobility   [] ROM to improve independence with functional mobility   [x] Balance Training to improve static/dynamic balance and to reduce fall risk  [x] Endurance Training to improve activity tolerance during functional mobility   [x] Transfer Training to improve safety and independence with all functional transfers   [x] Gait Training to improve gait mechanics, endurance and assess need for appropriate assistive device  [] Stair Training in preparation for safe discharge home and/or into the community   [] Positioning to prevent skin breakdown and contractures  [x] Safety and Education Training [x] Patient/Caregiver Education   [] HEP  [] Other     PT long term treatment goals are located in above grid    Frequency of treatments: 2-5x/week x 1-2 weeks. Time in  1145  Time out  1212    Total Treatment Time  15 minutes     Evaluation Time includes thorough review of current medical information, gathering information on past medical history/social history and prior level of function, completion of standardized testing/informal observation of tasks, assessment of data and education on plan of care and goals.     CPT codes:  [x] Low Complexity PT evaluation 24001  [] Moderate Complexity PT evaluation 98427  [] High Complexity PT evaluation 83579  [] PT Re-evaluation 01512  [] Gait training 17812 -- minutes  [] Manual therapy [id number] -- minutes  [x] Therapeutic activities 69509 15 minutes  [] Therapeutic exercises 70146 -- minutes  [] Neuromuscular reeducation 53853 -- minutes     Soloomn Macario, PT, DPT  [id number]

## 2022-06-21 NOTE — CONSULTS
NEUROSURGERY INPATIENT CONSULT NOTE    Chief Complaint: Thoracic Compression fractures     HPI:   Paris Patterson is a 80 y.o.  female who presents today with mid to lower back pain. Patient states she was walking into her house when she lost her footing and fell and landed on her left side. She started to have mid to lower back pain after this. She denies any numbness or weakness associated with this pain. No bowel or bladder incontinence. Patient states she has had compression fractures before that required a Kyphoplasty. CT Thoracic Spine with T6,T7 and T8 compression fractures of unknown chronicity which is why Neurosurgery was consulted. Past Medical History:   Diagnosis Date    Atrial fibrillation [facility])      Past Surgical History:   Procedure Laterality Date    UPPER GASTROINTESTINAL ENDOSCOPY  07/25/2017      No family history on file.      Medications:   Current Facility-Administered Medications   Medication Dose Route Frequency Provider Last Rate Last Admin    albuterol (PROVENTIL) nebulizer solution 2.5 mg  2.5 mg Nebulization Q6H PRN Andreea Dove MD        apixaban Claudette Lee) tablet 2.5 mg  2.5 mg Oral BID Andreea Dove MD        rosuvastatin (CRESTOR) tablet 10 mg  10 mg Oral Daily Andreea Dove MD        dilTIAZem [facility] CD) extended release capsule 120 mg  120 mg Oral Daily Andreea Dove MD        ferrous sulfate (IRON 325) tablet 325 mg  325 mg Oral Daily with breakfast Andreea Dove MD        furosemide (LASIX) tablet 20 mg  20 mg Oral Once per day on Mon Wed Fri Andreea Dove MD        lisinopril (PRINIVIL;ZESTRIL) tablet 10 mg  10 mg Oral Daily Andreea Dove MD        nadolol (CORGARD) tablet 5 mg  5 mg Oral Daily Andreea Dove MD        potassium chloride [facility]) extended release tablet 20 mEq  20 mEq Oral Daily Andreea Dove MD        ipratropium (ATROVENT) 0.02 % nebulizer solution 0.5 mg  0.5 mg Nebulization 4x daily Mela Espino MD        And    Arformoterol Tartrate [facility]) nebulizer solution 15 mcg  15 mcg Nebulization BID Mela Espino MD            Allergies:    Dye [iodides] and Aleve [naproxen]     /88   Pulse 90   Temp 97.5 °F (36.4 °C) (Temporal)   Resp 16   Ht 4' 10\" (1.473 m)   Wt 112 lb (50.8 kg)   SpO2 97%   BMI 23.41 kg/m²     Review of Systems   Constitutional: Negative for chills and fever. HENT: Negative for trouble swallowing. Eyes: Negative for photophobia. Respiratory: Negative for shortness of breath. Cardiovascular: Negative for chest pain. Gastrointestinal: Negative for abdominal pain. Endocrine: Negative for heat intolerance. Genitourinary: Negative for difficulty urinating and flank pain. Musculoskeletal: Positive for back pain. Negative for myalgias. Skin: Negative for wound. Neurological: Negative for weakness, numbness and headaches. Psychiatric/Behavioral: Negative for confusion. Physical Exam  HENT:      Head: Normocephalic. Eyes:      Pupils: Pupils are equal, round, and reactive to light. Cardiovascular:      Rate and Rhythm: Normal rate. Pulmonary:      Effort: Pulmonary effort is normal.   Abdominal:      General: There is no distension. Musculoskeletal:         General: Normal range of motion. Cervical back: Normal range of motion. Skin:     General: Skin is warm and dry. Neurological:      Mental Status: She is alert. Comments: A&Ox3  CN3-12 intact  Motor Strength full   Sensation intact to light touch   Reflexes normal   Non tender to light palpation of back   Mild Tenderness to deep palpation of thoracic spine   Psychiatric:         Thought Content:  Thought content normal.        Imagin2022 CT Thoracic and Lumbar Spine  CT OF THE THORACIC SPINE:       1. Age-indeterminate compression fracture deformities of the T6, T7 and T8   vertebral bodies.   If indicated, MRI may be obtained for further evaluation. 2. Chronic compression fracture deformity with kyphoplasty changes at T12.   3. Demineralized bones. CT OF THE LUMBAR SPINE:       1. Chronic compression fracture deformities of the L1 and L2 bodies with   kyphoplasty changes in the former. 2. Degenerative changes, as described. 3. Cholelithiasis.       :   Unavailable             Assessment:   -Moses Dandy is a 79 y/o female who CT scan shows T6, T7, and T8 compression fractures of unknown chronicity. Plan:  -Pain control  -MRI thoracic to determine acuity of compression fractures  -Orthotics referral for TLSO brace for comfort  -Please call with any questions or concerns. Electronically signed by Charlie Newberry PA-C on 6/21/2022 at 9:03 AM     Nsx Attending:    Patient was seen and examined by me with the team.  I personally reviewed all pertinent radiological images. I concur with Miss Adames's clinical assessment and plan. In brief this delightful 43-year-old woman presents status post fall with mid to lower back pain. She denied any numbness or weakness. There is been no loss of bowel or bladder function. She has had a kyphoplasty in [place] in the past.  On my examination she is awake alert oriented and rationally conversant. She demonstrates a kyphotic stoop. She has minimal tenderness in the low back to palpation or deep percussion. She remains with full power in the lower extremities throughout. Review of her CT scan demonstrates age-indeterminate T6-8 compression fractures without canal compromise. Plan as above. Thank you so much for allowing us to participate in the care of this patient. I certify that I spent more than half of the total time on this encounter. NOTE: This report was transcribed using voice recognition software.  Every effort was made to ensure accuracy; however, inadvertent computerized transcription errors may be present

## 2022-06-22 VITALS
BODY MASS INDEX: 23.51 KG/M2 | DIASTOLIC BLOOD PRESSURE: 81 MMHG | HEIGHT: 58 IN | RESPIRATION RATE: 16 BRPM | HEART RATE: 78 BPM | WEIGHT: 112 LBS | OXYGEN SATURATION: 96 % | SYSTOLIC BLOOD PRESSURE: 112 MMHG | TEMPERATURE: 97.7 F

## 2022-06-22 PROCEDURE — 99214 OFFICE O/P EST MOD 30 MIN: CPT | Performed by: NEUROLOGICAL SURGERY

## 2022-06-22 PROCEDURE — G0378 HOSPITAL OBSERVATION PER HR: HCPCS

## 2022-06-22 PROCEDURE — 6360000002 HC RX W HCPCS: Performed by: FAMILY MEDICINE

## 2022-06-22 PROCEDURE — L0464 TLSO 4MOD SACRO-SCAP PRE: HCPCS

## 2022-06-22 PROCEDURE — 6370000000 HC RX 637 (ALT 250 FOR IP): Performed by: FAMILY MEDICINE

## 2022-06-22 PROCEDURE — 94640 AIRWAY INHALATION TREATMENT: CPT

## 2022-06-22 RX ADMIN — IPRATROPIUM BROMIDE 0.5 MG: 0.5 SOLUTION RESPIRATORY (INHALATION) at 17:05

## 2022-06-22 RX ADMIN — ARFORMOTEROL TARTRATE 15 MCG: 15 SOLUTION RESPIRATORY (INHALATION) at 09:46

## 2022-06-22 RX ADMIN — LISINOPRIL 10 MG: 20 TABLET ORAL at 08:28

## 2022-06-22 RX ADMIN — IPRATROPIUM BROMIDE 0.5 MG: 0.5 SOLUTION RESPIRATORY (INHALATION) at 09:46

## 2022-06-22 RX ADMIN — FERROUS SULFATE TAB 325 MG (65 MG ELEMENTAL FE) 325 MG: 325 (65 FE) TAB at 08:29

## 2022-06-22 RX ADMIN — NADOLOL 5 MG: 20 TABLET ORAL at 08:29

## 2022-06-22 RX ADMIN — FUROSEMIDE 20 MG: 20 TABLET ORAL at 08:28

## 2022-06-22 RX ADMIN — APIXABAN 2.5 MG: 2.5 TABLET, FILM COATED ORAL at 08:28

## 2022-06-22 RX ADMIN — ROSUVASTATIN CALCIUM 10 MG: 10 TABLET, FILM COATED ORAL at 08:29

## 2022-06-22 RX ADMIN — POTASSIUM CHLORIDE 20 MEQ: 20 TABLET, EXTENDED RELEASE ORAL at 08:29

## 2022-06-22 RX ADMIN — DILTIAZEM HYDROCHLORIDE 120 MG: 120 CAPSULE, COATED, EXTENDED RELEASE ORAL at 08:30

## 2022-06-22 ASSESSMENT — ENCOUNTER SYMPTOMS
ABDOMINAL PAIN: 0
SHORTNESS OF BREATH: 0

## 2022-06-22 NOTE — PROGRESS NOTES
Department of Neurosurgery  Progress Note    CHIEF COMPLAINT: T6, T7 and T8 fractures     SUBJECTIVE:  No acute events overnight. Patient states her back pain is feeling better. No pain down the legs. No numbness or weakness. Brace at bedside. MRI reviewed. REVIEW OF SYSTEMS :  Constitutional: Negative for chills and fever. Neurological: Negative for dizziness, tremors and speech change.      OBJECTIVE:   VITALS:  BP (!) 126/95   Pulse 88   Temp 97.3 °F (36.3 °C) (Oral)   Resp 16   Ht 4' 10\" (1.473 m)   Wt 112 lb (50.8 kg)   SpO2 96%   BMI 23.41 kg/m²     PHYSICAL:  Alert, oriented  Appears stated age  PERRL  EOMI  CAIN  Sensation intact to light touch      DATA:  CBC:   Lab Results   Component Value Date    WBC 4.0 06/24/2021    RBC 4.92 06/24/2021    HGB 14.4 06/24/2021    HCT 45.9 06/24/2021    MCV 93.3 06/24/2021    MCH 29.3 06/24/2021    MCHC 31.4 06/24/2021    RDW 13.3 06/24/2021     06/24/2021    MPV 11.0 06/24/2021     BMP:    Lab Results   Component Value Date     06/24/2021    K 3.9 06/24/2021    K 4.7 06/21/2021    CL 98 06/24/2021    CO2 31 06/24/2021    BUN 21 06/24/2021    LABALBU 4.3 06/21/2021    CREATININE 1.0 06/24/2021    CALCIUM 9.7 06/24/2021    GFRAA >60 06/24/2021    LABGLOM 53 06/24/2021    GLUCOSE 97 06/24/2021     PT/INR:    Lab Results   Component Value Date    PROTIME 13.4 06/21/2021    INR 1.2 06/21/2021     PTT:    Lab Results   Component Value Date    APTT 37.6 06/21/2021   [APTT}    Current Inpatient Medications  Current Facility-Administered Medications: albuterol (PROVENTIL) nebulizer solution 2.5 mg, 2.5 mg, Nebulization, Q6H PRN  apixaban (ELIQUIS) tablet 2.5 mg, 2.5 mg, Oral, BID  rosuvastatin (CRESTOR) tablet 10 mg, 10 mg, Oral, Daily  dilTIAZem (CARDIZEM CD) extended release capsule 120 mg, 120 mg, Oral, Daily  ferrous sulfate (IRON 325) tablet 325 mg, 325 mg, Oral, Daily with breakfast  furosemide (LASIX) tablet 20 mg, 20 mg, Oral, Once per day on Mon Wed Fri  lisinopril (PRINIVIL;ZESTRIL) tablet 10 mg, 10 mg, Oral, Daily  nadolol (CORGARD) tablet 5 mg, 5 mg, Oral, Daily  potassium chloride (KLOR-CON M) extended release tablet 20 mEq, 20 mEq, Oral, Daily  ipratropium (ATROVENT) 0.02 % nebulizer solution 0.5 mg, 0.5 mg, Nebulization, 4x daily **AND** Arformoterol Tartrate (BROVANA) nebulizer solution 15 mcg, 15 mcg, Nebulization, BID    ASSESSMENT:   -Lanny Choi is a 79 y/o male who MRI shows chronic T6. T7 and T8 fracture. Stable. PLAN:  -Pain control  -TLSO brace for comfort  -Okay to d/c from our standpoint  -Follow up in clinic in 4 weeks with thoracic XR  -Please call with any questions or concerns. Electronically signed by Sharad Das PA-C on 6/22/2022 at 11:47 AM     Nsx Attending:    Patient was seen and examined by me with the team.  I personally reviewed all pertinent radiological images. I concur with Miss Adames's clinical assessment and plan. No new complaints. Patient states that pain is under control on minimal.  Remains neurologically intact with preserved power for me on exam.  Plan as above. Thank you so much for allowing us to participate in the care of this patient. I certify that I spent more than half of the total time on this encounter. NOTE: This report was transcribed using voice recognition software.  Every effort was made to ensure accuracy; however, inadvertent computerized transcription errors may be present

## 2022-06-22 NOTE — PLAN OF CARE
Problem: Chronic Conditions and Co-morbidities  Goal: Patient's chronic conditions and co-morbidity symptoms are monitored and maintained or improved  Outcome: Progressing     Problem: Pain  Goal: Verbalizes/displays adequate comfort level or baseline comfort level  Outcome: Progressing  Flowsheets (Taken 6/22/2022 6118)  Verbalizes/displays adequate comfort level or baseline comfort level: Assess pain using appropriate pain scale     Problem: Safety - Adult  Goal: Free from fall injury  Outcome: Progressing     Problem: ABCDS Injury Assessment  Goal: Absence of physical injury  Outcome: Progressing

## 2022-06-22 NOTE — PLAN OF CARE
Problem: Chronic Conditions and Co-morbidities  Goal: Patient's chronic conditions and co-morbidity symptoms are monitored and maintained or improved  6/22/2022 1644 by Fern Cummings RN  Outcome: Completed  6/22/2022 1434 by Sidney Lujan RN  Outcome: Progressing     Problem: Discharge Planning  Goal: Discharge to home or other facility with appropriate resources  Outcome: Completed     Problem: Pain  Goal: Verbalizes/displays adequate comfort level or baseline comfort level  6/22/2022 1644 by Fern Cummings RN  Outcome: Completed  6/22/2022 1434 by Sidney Lujan RN  Outcome: Progressing  Flowsheets (Taken 6/22/2022 0845)  Verbalizes/displays adequate comfort level or baseline comfort level: Assess pain using appropriate pain scale     Problem: Safety - Adult  Goal: Free from fall injury  6/22/2022 1644 by Fern Cummings RN  Outcome: Completed  6/22/2022 1434 by Sidney Lujan RN  Outcome: Progressing     Problem: ABCDS Injury Assessment  Goal: Absence of physical injury  6/22/2022 1644 by Fern Cummings RN  Outcome: Completed  6/22/2022 1434 by Sidney Lujan RN  Outcome: Progressing

## 2022-06-22 NOTE — H&P
HISTORY AND PHYSICAL             Date: 6/22/2022        Patient Name: Paris Patterson     YOB: 1940      Age:  80 y.o. Chief Complaint     Chief Complaint   Patient presents with    Fall     GLF 1 wk ago tripped walking up concrete step and fell over onto R side +hit head. +elequis. denies dizziness or lightheadedness. Denies LOC. c/o LBPand  HA, neck pain           History Obtained From   patient    History of Present Illness   Patient came to the ER with increasing mid back pain after a mechanical fall. Past Medical History     Past Medical History:   Diagnosis Date    Atrial fibrillation [facility])         Past Surgical History     Past Surgical History:   Procedure Laterality Date    UPPER GASTROINTESTINAL ENDOSCOPY  07/25/2017        Medications Prior to Admission     Prior to Admission medications    Medication Sig Start Date End Date Taking? Authorizing Provider   rosuvastatin (CRESTOR) 10 MG tablet Take 10 mg by mouth daily   Yes Historical Provider, MD   mupirocin (BACTROBAN) 2 % ointment Apply topically 3 times daily.  3/31/22   HAILEY Priest   lisinopril (PRINIVIL;ZESTRIL) 10 MG tablet Take 1 tablet by mouth daily 6/25/21   Rubbie Lennox, DO   dilTIAZem (CARDIZEM CD) 120 MG extended release capsule Take 1 capsule by mouth daily 6/25/21   Rubbie Lennox, DO   furosemide (LASIX) 20 MG tablet Take 1 tablet by mouth three times a week Mon-Weds-Sat only 6/25/21   Rubbie Lennox, DO   Tiotropium Bromide-Olodaterol (STIOLTO RESPIMAT) 2.5-2.5 MCG/ACT AERS Inhale 2 actuation into the lungs daily 3/15/21   Mela Gusman MD   albuterol sulfate HFA (PROVENTIL HFA) 108 (90 Base) MCG/ACT inhaler Inhale 2 puffs into the lungs every 6 hours as needed for Wheezing 12/15/20   Mela Gusman MD   ferrous sulfate 325 (65 Fe) MG tablet Take 325 mg by mouth daily (with breakfast)     Historical Provider, MD   NADOLOL PO Take 5 mg by mouth daily     Historical Provider, MD   potassium chloride (KLOR-CON M) 20 MEQ extended release tablet Take 20 mEq by mouth daily    Historical Provider, MD   alendronate (FOSAMAX) 70 MG tablet Take 70 mg by mouth every 7 days Sundays    Historical Provider, MD   apixaban (ELIQUIS) 5 MG TABS tablet Take 2.5 mg by mouth 2 times daily     Historical Provider, MD        Allergies   Dye [iodides] and Aleve [naproxen]    Social History     Social History     Tobacco History     Smoking Status  Former Smoker Smoking Start Date  12/15/1961 Quit date  12/15/1982 Smoking Frequency  1.5 packs/day for 20 years (30 pk yrs)    Smoking Tobacco Type  Cigarettes    Smokeless Tobacco Use  Never Used          Alcohol History     Alcohol Use Status  Yes Comment  SOCIALLY          Drug Use     Drug Use Status  No          Sexual Activity     Sexually Active  Not Currently                Family History   No family history on file. Review of Systems   Review of Systems   Constitutional: Positive for activity change. HENT: Negative for congestion. Respiratory: Negative for shortness of breath. Cardiovascular: Negative for chest pain. Gastrointestinal: Negative for abdominal pain. Genitourinary: Negative for difficulty urinating. Musculoskeletal: Negative for arthralgias. Neurological: Negative for dizziness. Physical Exam   BP (!) 109/90   Pulse 87   Temp 97 °F (36.1 °C) (Temporal)   Resp 16   Ht 4' 10\" (1.473 m)   Wt 112 lb (50.8 kg)   SpO2 91%   BMI 23.41 kg/m²     Physical Exam  Vitals reviewed. HENT:      Mouth/Throat:      Mouth: Mucous membranes are moist.   Eyes:      Pupils: Pupils are equal, round, and reactive to light. Cardiovascular:      Rate and Rhythm: Normal rate and regular rhythm. Pulses: Normal pulses. Heart sounds: Normal heart sounds. Pulmonary:      Effort: Pulmonary effort is normal. No respiratory distress. Breath sounds: Normal breath sounds. No wheezing. Abdominal:      General: Abdomen is flat.  Bowel sounds are normal. There is no distension. Tenderness: There is no abdominal tenderness. Musculoskeletal:         General: Tenderness present. Skin:     General: Skin is warm and dry. Capillary Refill: Capillary refill takes less than 2 seconds. Neurological:      General: No focal deficit present. Mental Status: She is alert and oriented to person, place, and time. Psychiatric:         Mood and Affect: Mood normal.         Behavior: Behavior normal.         Labs    No results found for this or any previous visit (from the past 24 hour(s)). Imaging/Diagnostics Last 24 Hours   CT HEAD WO CONTRAST    Result Date: 6/20/2022  EXAMINATION: CT OF THE HEAD WITHOUT CONTRAST; CT OF THE CERVICAL SPINE WITHOUT CONTRAST; CT OF THE LUMBAR SPINE WITHOUT CONTRAST; CT OF THE THORACIC SPINE WITHOUT CONTRAST  6/20/2022 7:03 pm TECHNIQUE: CT of the head was performed without the administration of intravenous contrast. Automated exposure control, iterative reconstruction, and/or weight based adjustment of the mA/kV was utilized to reduce the radiation dose to as low as reasonably achievable.; CT of the cervical spine was performed without the administration of intravenous contrast. Multiplanar reformatted images are provided for review. Automated exposure control, iterative reconstruction, and/or weight based adjustment of the mA/kV was utilized to reduce the radiation dose to as low as reasonably achievable.; CT of the lumbar spine was performed without the administration of intravenous contrast. Multiplanar reformatted images are provided for review. Adjustment of mA and/or kV according to patient size was utilized.   Automated exposure control, iterative reconstruction, and/or weight based adjustment of the mA/kV was utilized to reduce the radiation dose to as low as reasonably achievable.; CT of the thoracic spine was performed without the administration of intravenous contrast. Multiplanar reformatted images are provided for review. Automated exposure control, iterative reconstruction, and/or weight based adjustment of the mA/kV was utilized to reduce the radiation dose to as low as reasonably achievable. COMPARISON: None. HISTORY: ORDERING SYSTEM PROVIDED HISTORY: Trauma, fall on Eliquis TECHNOLOGIST PROVIDED HISTORY: Has a \"code stroke\" or \"stroke alert\" been called? ->No Reason for exam:->Trauma, fall on Eliquis Decision Support Exception - unselect if not a suspected or confirmed emergency medical condition->Emergency Medical Condition ([place]) What reading provider will be dictating this exam?->CRC FINDINGS: CT OF THE BRAIN: BRAIN/VENTRICLES: No mass effect, edema or hemorrhage is seen. Mild-to-moderate volume loss is seen in the brain with mild microvascular ischemic changes. No hydrocephalus or extra-axial fluid is seen. ORBITS: Prosthetic lenses are seen in the globes bilaterally. The orbits are otherwise grossly unremarkable. SINUSES: The visualized paranasal sinuses and mastoid air cells demonstrate no acute abnormality. SOFT TISSUES/SKULL: No acute abnormality of the visualized skull or soft tissues. CT CERVICAL SPINE: BONES/ALIGNMENT: There is no acute fracture or traumatic malalignment. DEGENERATIVE CHANGES: Minimal retrolisthesis of C6 over C7. Minimal anterolisthesis of C7 over T1. Severe loss of disc heights from C3-4 to C6-7. Small disc osteophyte complexes throughout the cervical spine, resulting in moderate central canal stenosis at C5-6. Mild stenosis at C6-7. Multilevel neural foraminal stenoses, worst (severe) at the right C5-6 level. SOFT TISSUES: There is no prevertebral soft tissue swelling. CT OF THE THORACIC SPINE: BONES/ALIGNMENT: There is a severe age-indeterminate compression fracture deformity of the T7 vertebral body. Moderate compression fracture deformities of the T6 and T8 vertebral bodies.   Moderate chronic compression fracture deformities of the T12 and L1 vertebral bodies with kyphoplasty changes. Mild exaggeration of the thoracic kyphosis. The bones are demineralized. No lytic lytic or blastic lesion is seen. DEGENERATIVE CHANGES: Chronically retropulsed fracture fragments at T12 and L1 result in mild central canal stenoses. Mild to moderate neural foraminal stenoses throughout the thoracic spine. SOFT TISSUES: No paraspinal mass is seen. CT OF THE LUMBAR SPINE: BONES/ALIGNMENT: Moderate grade chronic compression fracture deformities the L1 and L2 vertebral bodies with kyphoplasty changes in the former. No lytic blastic lesion is seen. The lumbar lordosis is grossly preserved. SOFT TISSUES: No paraspinal mass identified. DEGENERATIVE CHANGES: Prominent loss of disc heights with disc bulges at L2-3 and L3-4 result in mild central canal stenoses. Facet hypertrophic changes result in variable degrees of neural foraminal stenoses, worst (severe) at L2-3. MISCELLANEOUS: A 1.5 cm calcified gallstone is seen. CT OF THE BRAIN: 1. No skull fracture or acute intracranial abnormality. CT OF THE CERVICAL SPINE: 1. No fracture or joint dislocation is seen. 2. Degenerative changes, as described. CT OF THE THORACIC SPINE: 1. Age-indeterminate compression fracture deformities of the T6, T7 and T8 vertebral bodies. If indicated, MRI may be obtained for further evaluation. 2. Chronic compression fracture deformity with kyphoplasty changes at T12. 3. Demineralized bones. CT OF THE LUMBAR SPINE: 1. Chronic compression fracture deformities of the L1 and L2 bodies with kyphoplasty changes in the former. 2. Degenerative changes, as described. 3. Cholelithiasis.  : Unavailable     CT CERVICAL SPINE WO CONTRAST    Result Date: 6/20/2022  EXAMINATION: CT OF THE HEAD WITHOUT CONTRAST; CT OF THE CERVICAL SPINE WITHOUT CONTRAST; CT OF THE LUMBAR SPINE WITHOUT CONTRAST; CT OF THE THORACIC SPINE WITHOUT CONTRAST  6/20/2022 7:03 pm TECHNIQUE: CT of the head was performed without the administration of intravenous contrast. Automated exposure control, iterative reconstruction, and/or weight based adjustment of the mA/kV was utilized to reduce the radiation dose to as low as reasonably achievable.; CT of the cervical spine was performed without the administration of intravenous contrast. Multiplanar reformatted images are provided for review. Automated exposure control, iterative reconstruction, and/or weight based adjustment of the mA/kV was utilized to reduce the radiation dose to as low as reasonably achievable.; CT of the lumbar spine was performed without the administration of intravenous contrast. Multiplanar reformatted images are provided for review. Adjustment of mA and/or kV according to patient size was utilized. Automated exposure control, iterative reconstruction, and/or weight based adjustment of the mA/kV was utilized to reduce the radiation dose to as low as reasonably achievable.; CT of the thoracic spine was performed without the administration of intravenous contrast. Multiplanar reformatted images are provided for review. Automated exposure control, iterative reconstruction, and/or weight based adjustment of the mA/kV was utilized to reduce the radiation dose to as low as reasonably achievable. COMPARISON: None. HISTORY: ORDERING SYSTEM PROVIDED HISTORY: Trauma, fall on Eliquis TECHNOLOGIST PROVIDED HISTORY: Has a \"code stroke\" or \"stroke alert\" been called? ->No Reason for exam:->Trauma, fall on Eliquis Decision Support Exception - unselect if not a suspected or confirmed emergency medical condition->Emergency Medical Condition ([place]) What reading provider will be dictating this exam?->CRC FINDINGS: CT OF THE BRAIN: BRAIN/VENTRICLES: No mass effect, edema or hemorrhage is seen. Mild-to-moderate volume loss is seen in the brain with mild microvascular ischemic changes. No hydrocephalus or extra-axial fluid is seen. ORBITS: Prosthetic lenses are seen in the globes bilaterally.   The orbits are otherwise grossly unremarkable. SINUSES: The visualized paranasal sinuses and mastoid air cells demonstrate no acute abnormality. SOFT TISSUES/SKULL: No acute abnormality of the visualized skull or soft tissues. CT CERVICAL SPINE: BONES/ALIGNMENT: There is no acute fracture or traumatic malalignment. DEGENERATIVE CHANGES: Minimal retrolisthesis of C6 over C7. Minimal anterolisthesis of C7 over T1. Severe loss of disc heights from C3-4 to C6-7. Small disc osteophyte complexes throughout the cervical spine, resulting in moderate central canal stenosis at C5-6. Mild stenosis at C6-7. Multilevel neural foraminal stenoses, worst (severe) at the right C5-6 level. SOFT TISSUES: There is no prevertebral soft tissue swelling. CT OF THE THORACIC SPINE: BONES/ALIGNMENT: There is a severe age-indeterminate compression fracture deformity of the T7 vertebral body. Moderate compression fracture deformities of the T6 and T8 vertebral bodies. Moderate chronic compression fracture deformities of the T12 and L1 vertebral bodies with kyphoplasty changes. Mild exaggeration of the thoracic kyphosis. The bones are demineralized. No lytic lytic or blastic lesion is seen. DEGENERATIVE CHANGES: Chronically retropulsed fracture fragments at T12 and L1 result in mild central canal stenoses. Mild to moderate neural foraminal stenoses throughout the thoracic spine. SOFT TISSUES: No paraspinal mass is seen. CT OF THE LUMBAR SPINE: BONES/ALIGNMENT: Moderate grade chronic compression fracture deformities the L1 and L2 vertebral bodies with kyphoplasty changes in the former. No lytic blastic lesion is seen. The lumbar lordosis is grossly preserved. SOFT TISSUES: No paraspinal mass identified. DEGENERATIVE CHANGES: Prominent loss of disc heights with disc bulges at L2-3 and L3-4 result in mild central canal stenoses. Facet hypertrophic changes result in variable degrees of neural foraminal stenoses, worst (severe) at L2-3.  MISCELLANEOUS: A 1.5 cm calcified gallstone is seen. CT OF THE BRAIN: 1. No skull fracture or acute intracranial abnormality. CT OF THE CERVICAL SPINE: 1. No fracture or joint dislocation is seen. 2. Degenerative changes, as described. CT OF THE THORACIC SPINE: 1. Age-indeterminate compression fracture deformities of the T6, T7 and T8 vertebral bodies. If indicated, MRI may be obtained for further evaluation. 2. Chronic compression fracture deformity with kyphoplasty changes at T12. 3. Demineralized bones. CT OF THE LUMBAR SPINE: 1. Chronic compression fracture deformities of the L1 and L2 bodies with kyphoplasty changes in the former. 2. Degenerative changes, as described. 3. Cholelithiasis. : Unavailable     CT THORACIC SPINE WO CONTRAST    Result Date: 6/20/2022  EXAMINATION: CT OF THE HEAD WITHOUT CONTRAST; CT OF THE CERVICAL SPINE WITHOUT CONTRAST; CT OF THE LUMBAR SPINE WITHOUT CONTRAST; CT OF THE THORACIC SPINE WITHOUT CONTRAST  6/20/2022 7:03 pm TECHNIQUE: CT of the head was performed without the administration of intravenous contrast. Automated exposure control, iterative reconstruction, and/or weight based adjustment of the mA/kV was utilized to reduce the radiation dose to as low as reasonably achievable.; CT of the cervical spine was performed without the administration of intravenous contrast. Multiplanar reformatted images are provided for review. Automated exposure control, iterative reconstruction, and/or weight based adjustment of the mA/kV was utilized to reduce the radiation dose to as low as reasonably achievable.; CT of the lumbar spine was performed without the administration of intravenous contrast. Multiplanar reformatted images are provided for review. Adjustment of mA and/or kV according to patient size was utilized.   Automated exposure control, iterative reconstruction, and/or weight based adjustment of the mA/kV was utilized to reduce the radiation dose to as low as reasonably achievable.; CT of the thoracic spine was performed without the administration of intravenous contrast. Multiplanar reformatted images are provided for review. Automated exposure control, iterative reconstruction, and/or weight based adjustment of the mA/kV was utilized to reduce the radiation dose to as low as reasonably achievable. COMPARISON: None. HISTORY: ORDERING SYSTEM PROVIDED HISTORY: Trauma, fall on Eliquis TECHNOLOGIST PROVIDED HISTORY: Has a \"code stroke\" or \"stroke alert\" been called? ->No Reason for exam:->Trauma, fall on Eliquis Decision Support Exception - unselect if not a suspected or confirmed emergency medical condition->Emergency Medical Condition ([place]) What reading provider will be dictating this exam?->CRC FINDINGS: CT OF THE BRAIN: BRAIN/VENTRICLES: No mass effect, edema or hemorrhage is seen. Mild-to-moderate volume loss is seen in the brain with mild microvascular ischemic changes. No hydrocephalus or extra-axial fluid is seen. ORBITS: Prosthetic lenses are seen in the globes bilaterally. The orbits are otherwise grossly unremarkable. SINUSES: The visualized paranasal sinuses and mastoid air cells demonstrate no acute abnormality. SOFT TISSUES/SKULL: No acute abnormality of the visualized skull or soft tissues. CT CERVICAL SPINE: BONES/ALIGNMENT: There is no acute fracture or traumatic malalignment. DEGENERATIVE CHANGES: Minimal retrolisthesis of C6 over C7. Minimal anterolisthesis of C7 over T1. Severe loss of disc heights from C3-4 to C6-7. Small disc osteophyte complexes throughout the cervical spine, resulting in moderate central canal stenosis at C5-6. Mild stenosis at C6-7. Multilevel neural foraminal stenoses, worst (severe) at the right C5-6 level. SOFT TISSUES: There is no prevertebral soft tissue swelling. CT OF THE THORACIC SPINE: BONES/ALIGNMENT: There is a severe age-indeterminate compression fracture deformity of the T7 vertebral body.   Moderate compression fracture deformities of the T6 and T8 vertebral bodies. Moderate chronic compression fracture deformities of the T12 and L1 vertebral bodies with kyphoplasty changes. Mild exaggeration of the thoracic kyphosis. The bones are demineralized. No lytic lytic or blastic lesion is seen. DEGENERATIVE CHANGES: Chronically retropulsed fracture fragments at T12 and L1 result in mild central canal stenoses. Mild to moderate neural foraminal stenoses throughout the thoracic spine. SOFT TISSUES: No paraspinal mass is seen. CT OF THE LUMBAR SPINE: BONES/ALIGNMENT: Moderate grade chronic compression fracture deformities the L1 and L2 vertebral bodies with kyphoplasty changes in the former. No lytic blastic lesion is seen. The lumbar lordosis is grossly preserved. SOFT TISSUES: No paraspinal mass identified. DEGENERATIVE CHANGES: Prominent loss of disc heights with disc bulges at L2-3 and L3-4 result in mild central canal stenoses. Facet hypertrophic changes result in variable degrees of neural foraminal stenoses, worst (severe) at L2-3. MISCELLANEOUS: A 1.5 cm calcified gallstone is seen. CT OF THE BRAIN: 1. No skull fracture or acute intracranial abnormality. CT OF THE CERVICAL SPINE: 1. No fracture or joint dislocation is seen. 2. Degenerative changes, as described. CT OF THE THORACIC SPINE: 1. Age-indeterminate compression fracture deformities of the T6, T7 and T8 vertebral bodies. If indicated, MRI may be obtained for further evaluation. 2. Chronic compression fracture deformity with kyphoplasty changes at T12. 3. Demineralized bones. CT OF THE LUMBAR SPINE: 1. Chronic compression fracture deformities of the L1 and L2 bodies with kyphoplasty changes in the former. 2. Degenerative changes, as described. 3. Cholelithiasis.  : Unavailable     CT LUMBAR SPINE WO CONTRAST    Result Date: 6/20/2022  EXAMINATION: CT OF THE HEAD WITHOUT CONTRAST; CT OF THE CERVICAL SPINE WITHOUT CONTRAST; CT OF THE LUMBAR SPINE WITHOUT CONTRAST; CT OF THE THORACIC SPINE WITHOUT CONTRAST  6/20/2022 7:03 pm TECHNIQUE: CT of the head was performed without the administration of intravenous contrast. Automated exposure control, iterative reconstruction, and/or weight based adjustment of the mA/kV was utilized to reduce the radiation dose to as low as reasonably achievable.; CT of the cervical spine was performed without the administration of intravenous contrast. Multiplanar reformatted images are provided for review. Automated exposure control, iterative reconstruction, and/or weight based adjustment of the mA/kV was utilized to reduce the radiation dose to as low as reasonably achievable.; CT of the lumbar spine was performed without the administration of intravenous contrast. Multiplanar reformatted images are provided for review. Adjustment of mA and/or kV according to patient size was utilized. Automated exposure control, iterative reconstruction, and/or weight based adjustment of the mA/kV was utilized to reduce the radiation dose to as low as reasonably achievable.; CT of the thoracic spine was performed without the administration of intravenous contrast. Multiplanar reformatted images are provided for review. Automated exposure control, iterative reconstruction, and/or weight based adjustment of the mA/kV was utilized to reduce the radiation dose to as low as reasonably achievable. COMPARISON: None. HISTORY: ORDERING SYSTEM PROVIDED HISTORY: Trauma, fall on Eliquis TECHNOLOGIST PROVIDED HISTORY: Has a \"code stroke\" or \"stroke alert\" been called? ->No Reason for exam:->Trauma, fall on Eliquis Decision Support Exception - unselect if not a suspected or confirmed emergency medical condition->Emergency Medical Condition ([place]) What reading provider will be dictating this exam?->CRC FINDINGS: CT OF THE BRAIN: BRAIN/VENTRICLES: No mass effect, edema or hemorrhage is seen. Mild-to-moderate volume loss is seen in the brain with mild microvascular ischemic changes.   No hydrocephalus or extra-axial fluid is seen. ORBITS: Prosthetic lenses are seen in the globes bilaterally. The orbits are otherwise grossly unremarkable. SINUSES: The visualized paranasal sinuses and mastoid air cells demonstrate no acute abnormality. SOFT TISSUES/SKULL: No acute abnormality of the visualized skull or soft tissues. CT CERVICAL SPINE: BONES/ALIGNMENT: There is no acute fracture or traumatic malalignment. DEGENERATIVE CHANGES: Minimal retrolisthesis of C6 over C7. Minimal anterolisthesis of C7 over T1. Severe loss of disc heights from C3-4 to C6-7. Small disc osteophyte complexes throughout the cervical spine, resulting in moderate central canal stenosis at C5-6. Mild stenosis at C6-7. Multilevel neural foraminal stenoses, worst (severe) at the right C5-6 level. SOFT TISSUES: There is no prevertebral soft tissue swelling. CT OF THE THORACIC SPINE: BONES/ALIGNMENT: There is a severe age-indeterminate compression fracture deformity of the T7 vertebral body. Moderate compression fracture deformities of the T6 and T8 vertebral bodies. Moderate chronic compression fracture deformities of the T12 and L1 vertebral bodies with kyphoplasty changes. Mild exaggeration of the thoracic kyphosis. The bones are demineralized. No lytic lytic or blastic lesion is seen. DEGENERATIVE CHANGES: Chronically retropulsed fracture fragments at T12 and L1 result in mild central canal stenoses. Mild to moderate neural foraminal stenoses throughout the thoracic spine. SOFT TISSUES: No paraspinal mass is seen. CT OF THE LUMBAR SPINE: BONES/ALIGNMENT: Moderate grade chronic compression fracture deformities the L1 and L2 vertebral bodies with kyphoplasty changes in the former. No lytic blastic lesion is seen. The lumbar lordosis is grossly preserved. SOFT TISSUES: No paraspinal mass identified. DEGENERATIVE CHANGES: Prominent loss of disc heights with disc bulges at L2-3 and L3-4 result in mild central canal stenoses.   Facet hypertrophic changes result in variable degrees of neural foraminal stenoses, worst (severe) at L2-3. MISCELLANEOUS: A 1.5 cm calcified gallstone is seen. CT OF THE BRAIN: 1. No skull fracture or acute intracranial abnormality. CT OF THE CERVICAL SPINE: 1. No fracture or joint dislocation is seen. 2. Degenerative changes, as described. CT OF THE THORACIC SPINE: 1. Age-indeterminate compression fracture deformities of the T6, T7 and T8 vertebral bodies. If indicated, MRI may be obtained for further evaluation. 2. Chronic compression fracture deformity with kyphoplasty changes at T12. 3. Demineralized bones. CT OF THE LUMBAR SPINE: 1. Chronic compression fracture deformities of the L1 and L2 bodies with kyphoplasty changes in the former. 2. Degenerative changes, as described. 3. Cholelithiasis. [facility]       Assessment      [facility] Problems           Last Modified POA    * (Principal) Thoracic compression fracture, closed, initial encounter ([facility] [address] 6/20/2022 Yes      atrial fibrillations. fall    Plan   Neurosurgery following. TLSO brace  PT/OT  MRI report of thoracic spine pending. Continue home meds.     Consultations Ordered:  IP CONSULT TO NEUROSURGERY  IP CONSULT TO INTERNAL MEDICINE  INPATIENT CONSULT TO ORTHOTIST/PROSTHETIST  INPATIENT CONSULT TO ORTHOTIST/PROSTHETIST    Electronically signed by Laura Lopez MD on 6/22/22 at 6:49 AM EDT

## 2022-06-22 NOTE — DISCHARGE SUMMARY
Discharge Summary    Date: 6/22/2022  Patient Name: Allyson Gilbert YOB: 1940 Age: 80 y.o. Admit Date: 6/20/2022  Discharge Date: 6/22/2022  Discharge Condition: Stable    Admission Diagnosis  Muscle strain (T14.8XXA); Closed head injury, initial encounter (S09.90XA); Acute strain of neck muscle, initial encounter (S16.1XXA); Fall, initial encounter [facility]); Strain of lumbar region, initial encounter (M09.572A); Thoracic compression fracture, closed, initial encounter ([facility] [address] (S22.000A); Compression fracture of thoracic vertebra, unspecified thoracic vertebral level, [facility] (S22.000A)     Discharge Diagnosis  Principal Problem: Thoracic compression fracture, closed, initial encounter ([facility])Resolved Problems: * No resolved hospital problems. [facility] Stay  Narrative of Hospital Course:  Patient admitted for thoracic compression fractures  Had TLSO brace placed per neurosurgery    Consultants:  Mel Coleman TO ORTHOTIST/PROSTHETISTINPATIENT CONSULT TO ORTHOTIST/PROSTHETIST    Surgeries/procedures Performed:       Treatments:           Discharge Plan/Disposition:  Home    Hospital/Incidental Findings Requiring Follow Up:    Patient Instructions:    Diet: Cardiac Diet    Activity:Activity as Tolerated  For number of days (if applicable): Other Instructions:    Provider Follow-Up:   No follow-ups on file. Significant Diagnostic Studies:    Recent Labs:  No visits with results within 1 Day(s) from this visit. Latest known visit with results is:Admission on 10/02/2021, Discharged on 10/02/2021Ventricular Rate                            Date: 10/02/2021Value: 80          Ref range: BPM                Status: FinalAtrial Rate                                   Date: 10/02/2021Value: 300         Ref range: BPM                Status: FinalQRS Duration                                  Date: 10/02/2021Value: 62          Ref range: ms                 Status: FinalQ-T Interval                                  Date: 10/02/2021Value: 344         Ref range: ms                 Status: FinalQTc Calculation (Bazett)                      Date: 10/02/2021Value: 416         Ref range: ms                 Status: FinalR Axis                                        Date: 10/02/2021Value: 138         Ref range: degrees            Status: FinalT Axis                                        Date: 10/02/2021Value: 43          Ref range: degrees            Status: Final------------    Radiology last 7 days:  CT HEAD WO CONTRASTResult Date: 6/20/2022CT OF THE BRAIN: 1. No skull fracture or acute intracranial abnormality. CT OF THE CERVICAL SPINE: 1. No fracture or joint dislocation is seen. 2. Degenerative changes, as described. CT OF THE THORACIC SPINE: 1. Age-indeterminate compression fracture deformities of the T6, T7 and T8 vertebral bodies. If indicated, MRI may be obtained for further evaluation. 2. Chronic compression fracture deformity with kyphoplasty changes at T12. 3. Demineralized bones. CT OF THE LUMBAR SPINE: 1. Chronic compression fracture deformities of the L1 and L2 bodies with kyphoplasty changes in the former. 2. Degenerative changes, as described. 3. Cholelithiasis. : Unavailable CT CERVICAL SPINE WO CONTRASTResult Date: 6/20/2022CT OF THE BRAIN: 1. No skull fracture or acute intracranial abnormality. CT OF THE CERVICAL SPINE: 1. No fracture or joint dislocation is seen. 2. Degenerative changes, as described. CT OF THE THORACIC SPINE: 1. Age-indeterminate compression fracture deformities of the T6, T7 and T8 vertebral bodies. If indicated, MRI may be obtained for further evaluation. 2. Chronic compression fracture deformity with kyphoplasty changes at T12. 3. Demineralized bones. CT OF THE LUMBAR SPINE: 1. Chronic compression fracture deformities of the L1 and L2 bodies with kyphoplasty changes in the former.  2. Degenerative changes, as described. 3. Cholelithiasis. : Unavailable CT THORACIC SPINE WO CONTRASTResult Date: 6/20/2022CT OF THE BRAIN: 1. No skull fracture or acute intracranial abnormality. CT OF THE CERVICAL SPINE: 1. No fracture or joint dislocation is seen. 2. Degenerative changes, as described. CT OF THE THORACIC SPINE: 1. Age-indeterminate compression fracture deformities of the T6, T7 and T8 vertebral bodies. If indicated, MRI may be obtained for further evaluation. 2. Chronic compression fracture deformity with kyphoplasty changes at T12. 3. Demineralized bones. CT OF THE LUMBAR SPINE: 1. Chronic compression fracture deformities of the L1 and L2 bodies with kyphoplasty changes in the former. 2. Degenerative changes, as described. 3. Cholelithiasis. : Unavailable CT LUMBAR SPINE WO CONTRASTResult Date: 6/20/2022CT OF THE BRAIN: 1. No skull fracture or acute intracranial abnormality. CT OF THE CERVICAL SPINE: 1. No fracture or joint dislocation is seen. 2. Degenerative changes, as described. CT OF THE THORACIC SPINE: 1. Age-indeterminate compression fracture deformities of the T6, T7 and T8 vertebral bodies. If indicated, MRI may be obtained for further evaluation. 2. Chronic compression fracture deformity with kyphoplasty changes at T12. 3. Demineralized bones. CT OF THE LUMBAR SPINE: 1. Chronic compression fracture deformities of the L1 and L2 bodies with kyphoplasty changes in the former. 2. Degenerative changes, as described. 3. Cholelithiasis. Herb Quan MRI THORACIC SPINE WO CONTRASTResult Date: 6/22/20221. Stable chronic compression deformities at T6, T7, and T8 compared to prior CT examination. 2. Chronic compression deformities at T12, L1, and L2. There is methylmethacrylate at T12 and L1 from prior vertebroplasty or kyphoplasty.  There is mild retropulsion at the superior endplates of Y75 and L1. 3. There are degenerative changes in the thoracic spine without evidence of central canal stenosis or neural foraminal narrowing. [unfilled]    Discharge Medications    Current Discharge Medication List    Current Discharge Medication List    Current Discharge Medication ListCONTINUE these medications which have NOT CHANGEDrosuvastatin (CRESTOR) 10 MG tabletTake 10 mg by mouth dailymupirocin (BACTROBAN) 2 % ointmentApply topically 3 times daily. Qty: 15 g Refills: 0Associated Diagnoses:Leg laceration, left, initial encounterlisinopril (PRINIVIL;ZESTRIL) 10 MG tabletTake 1 tablet by mouth dailyQty: 30 tablet Refills: 3dilTIAZem (CARDIZEM CD) 120 MG extended release capsuleTake 1 capsule by mouth dailyQty: 30 capsule Refills: 3furosemide (LASIX) 20 MG tabletTake 1 tablet by mouth three times a week Mon-Weds-Sat onlyQty: 60 tablet Refills: 3Tiotropium Bromide-Olodaterol (STIOLTO RESPIMAT) 2.5-2.5 MCG/ACT AERSInhale 2 actuation into the lungs dailyQty: 1 Inhaler Refills: 0albuterol sulfate HFA (PROVENTIL HFA) 108 (90 Base) MCG/ACT inhalerInhale 2 puffs into the lungs every 6 hours as needed for [employer]: 1 Inhaler Refills: 3ferrous sulfate 325 (65 Fe) MG tabletTake 325 mg by mouth daily (with breakfast) NADOLOL POTake 5 mg by mouth daily potassium chloride (KLOR-CON M) 20 MEQ extended release tabletTake 20 mEq by mouth dailyalendronate (FOSAMAX) 70 MG tabletTake 70 mg by mouth every 7 days Sundaysapixaban (ELIQUIS) 5 MG TABS tabletTake 2.5 mg by mouth 2 times daily     Current Discharge Medication ListSTOP taking these medicationsatorvastatin (LIPITOR) 20 MG tabletComments:Reason for Stopping:    Time Spent on Discharge:1E] minutes were spent in patient examination, evaluation, counseling as well as medication reconciliation, prescriptions for required medications, discharge plan, and follow up.     Electronically signed by Se Addison MD on 6/22/22 at 4:42 PM EDT

## 2022-06-22 NOTE — PROGRESS NOTES
Pt stated that the brace was not fitting her correct. Pt attempting to leave with brace. Educated pt on the importance of having brace. Pt stated that Kay Citizen was suppose to call her and help with the brace fitting better\". Spoke with Adalid Craig regarding this. Adalid Craig requested to speak with the pt regarding the brace and possibly setting something up outpatient so she can adjust the brace. Adalid Craig called stating that the pt will take brace and follow up outpt.

## 2022-06-22 NOTE — CARE COORDINATION
Pt upset and requesting to speak to LSW. Pt states she was told she was financially responsible for everything and that nothing would be covered. Explained how observation works, pt understanding. Db Melendez, MSW, LSW

## 2022-07-06 DIAGNOSIS — S22.000A THORACIC COMPRESSION FRACTURE, CLOSED, INITIAL ENCOUNTER (HCC): Primary | ICD-10-CM

## 2022-07-21 ENCOUNTER — OFFICE VISIT (OUTPATIENT)
Dept: NEUROSURGERY | Age: 82
End: 2022-07-21
Payer: MEDICARE

## 2022-07-21 ENCOUNTER — HOSPITAL ENCOUNTER (OUTPATIENT)
Dept: GENERAL RADIOLOGY | Age: 82
Discharge: HOME | End: 2022-07-23
Payer: MEDICARE

## 2022-07-21 ENCOUNTER — HOSPITAL ENCOUNTER (OUTPATIENT)
Age: 82
Discharge: HOME | End: 2022-07-23
Payer: MEDICARE

## 2022-07-21 VITALS
SYSTOLIC BLOOD PRESSURE: 124 MMHG | HEIGHT: 58 IN | DIASTOLIC BLOOD PRESSURE: 88 MMHG | BODY MASS INDEX: 23.51 KG/M2 | WEIGHT: 112 LBS | HEART RATE: 86 BPM

## 2022-07-21 DIAGNOSIS — S22.050D CLOSED WEDGE COMPRESSION FRACTURE OF T6 VERTEBRA WITH ROUTINE HEALING, SUBSEQUENT ENCOUNTER: Primary | ICD-10-CM

## 2022-07-21 DIAGNOSIS — S22.060D CLOSED WEDGE COMPRESSION FRACTURE OF T8 VERTEBRA WITH ROUTINE HEALING, SUBSEQUENT ENCOUNTER: ICD-10-CM

## 2022-07-21 DIAGNOSIS — S22.000A THORACIC COMPRESSION FRACTURE, CLOSED, INITIAL ENCOUNTER (HCC): ICD-10-CM

## 2022-07-21 DIAGNOSIS — S22.060D CLOSED WEDGE COMPRESSION FRACTURE OF T7 VERTEBRA WITH ROUTINE HEALING, SUBSEQUENT ENCOUNTER: ICD-10-CM

## 2022-07-21 PROCEDURE — G8427 DOCREV CUR MEDS BY ELIG CLIN: HCPCS | Performed by: STUDENT IN AN ORGANIZED HEALTH CARE EDUCATION/TRAINING PROGRAM

## 2022-07-21 PROCEDURE — 1036F TOBACCO NON-USER: CPT | Performed by: STUDENT IN AN ORGANIZED HEALTH CARE EDUCATION/TRAINING PROGRAM

## 2022-07-21 PROCEDURE — 1123F ACP DISCUSS/DSCN MKR DOCD: CPT | Performed by: STUDENT IN AN ORGANIZED HEALTH CARE EDUCATION/TRAINING PROGRAM

## 2022-07-21 PROCEDURE — 72070 X-RAY EXAM THORAC SPINE 2VWS: CPT

## 2022-07-21 PROCEDURE — 99213 OFFICE O/P EST LOW 20 MIN: CPT | Performed by: STUDENT IN AN ORGANIZED HEALTH CARE EDUCATION/TRAINING PROGRAM

## 2022-07-21 PROCEDURE — G8400 PT W/DXA NO RESULTS DOC: HCPCS | Performed by: STUDENT IN AN ORGANIZED HEALTH CARE EDUCATION/TRAINING PROGRAM

## 2022-07-21 PROCEDURE — 1090F PRES/ABSN URINE INCON ASSESS: CPT | Performed by: STUDENT IN AN ORGANIZED HEALTH CARE EDUCATION/TRAINING PROGRAM

## 2022-07-21 PROCEDURE — G8420 CALC BMI NORM PARAMETERS: HCPCS | Performed by: STUDENT IN AN ORGANIZED HEALTH CARE EDUCATION/TRAINING PROGRAM

## 2022-07-21 PROCEDURE — 99212 OFFICE O/P EST SF 10 MIN: CPT

## 2022-07-21 NOTE — PROGRESS NOTES
Hospital Follow-up     This is a 80year old female who presents to the office for a 1 month follow-up s/p T6, T7 and T8 fractures. Subjective: Patient states she is doing well. She states the brace is helping with the back pain. She denies any numbness or weakness in the legs. No bowel or bladder incontinence. Brace complaint. XR reviewed with patient. Physical Exam:              WDWN, no apparent distress              Non-labored breathing               Vitals Stable              Alert and oriented x3              CN 3-12 intact              PERRL              EOMI              CAIN well              Motor strength symmetric              Sensation to LT intact bilaterally   In TLSO brace                  Imagin2022 XR Thoracic Spine  Thoracic fractures seen, stable, no acute processes noted-final read pending. Assessment: This is a 80 y.o.  female presenting for a 1 month follow-up s/p T6,T7 and T8 fractures. Plan:  -Pain control and expectations discussed  -Continue brace and restrictions   -OARRS report reviewed   -Follow-up in neurosurgery clinic in 2 months with repeat XR  -Call or return to neurosurgery office sooner if symptoms worsen or if new issues arise in the interim.     Electronically signed by Joy Muniz PA-C on 2022 at 12:58 PM

## 2022-09-27 ENCOUNTER — HOSPITAL ENCOUNTER (OUTPATIENT)
Age: 82
Discharge: HOME | End: 2022-09-29
Payer: MEDICARE

## 2022-09-27 ENCOUNTER — OFFICE VISIT (OUTPATIENT)
Dept: NEUROSURGERY | Age: 82
End: 2022-09-27
Payer: MEDICARE

## 2022-09-27 ENCOUNTER — HOSPITAL ENCOUNTER (OUTPATIENT)
Dept: GENERAL RADIOLOGY | Age: 82
Discharge: HOME | End: 2022-09-29
Payer: MEDICARE

## 2022-09-27 VITALS
RESPIRATION RATE: 20 BRPM | SYSTOLIC BLOOD PRESSURE: 137 MMHG | BODY MASS INDEX: 23.51 KG/M2 | WEIGHT: 112 LBS | HEART RATE: 59 BPM | DIASTOLIC BLOOD PRESSURE: 86 MMHG | HEIGHT: 58 IN

## 2022-09-27 DIAGNOSIS — S22.060D CLOSED WEDGE COMPRESSION FRACTURE OF T8 VERTEBRA WITH ROUTINE HEALING, SUBSEQUENT ENCOUNTER: ICD-10-CM

## 2022-09-27 DIAGNOSIS — S22.060D CLOSED WEDGE COMPRESSION FRACTURE OF T7 VERTEBRA WITH ROUTINE HEALING, SUBSEQUENT ENCOUNTER: ICD-10-CM

## 2022-09-27 DIAGNOSIS — S22.050D CLOSED WEDGE COMPRESSION FRACTURE OF T6 VERTEBRA WITH ROUTINE HEALING, SUBSEQUENT ENCOUNTER: Primary | ICD-10-CM

## 2022-09-27 DIAGNOSIS — S22.050D CLOSED WEDGE COMPRESSION FRACTURE OF T6 VERTEBRA WITH ROUTINE HEALING, SUBSEQUENT ENCOUNTER: ICD-10-CM

## 2022-09-27 PROCEDURE — G8427 DOCREV CUR MEDS BY ELIG CLIN: HCPCS | Performed by: STUDENT IN AN ORGANIZED HEALTH CARE EDUCATION/TRAINING PROGRAM

## 2022-09-27 PROCEDURE — 1036F TOBACCO NON-USER: CPT | Performed by: STUDENT IN AN ORGANIZED HEALTH CARE EDUCATION/TRAINING PROGRAM

## 2022-09-27 PROCEDURE — G8400 PT W/DXA NO RESULTS DOC: HCPCS | Performed by: STUDENT IN AN ORGANIZED HEALTH CARE EDUCATION/TRAINING PROGRAM

## 2022-09-27 PROCEDURE — 99212 OFFICE O/P EST SF 10 MIN: CPT

## 2022-09-27 PROCEDURE — 72070 X-RAY EXAM THORAC SPINE 2VWS: CPT

## 2022-09-27 PROCEDURE — 1090F PRES/ABSN URINE INCON ASSESS: CPT | Performed by: STUDENT IN AN ORGANIZED HEALTH CARE EDUCATION/TRAINING PROGRAM

## 2022-09-27 PROCEDURE — 99213 OFFICE O/P EST LOW 20 MIN: CPT | Performed by: STUDENT IN AN ORGANIZED HEALTH CARE EDUCATION/TRAINING PROGRAM

## 2022-09-27 PROCEDURE — G8420 CALC BMI NORM PARAMETERS: HCPCS | Performed by: STUDENT IN AN ORGANIZED HEALTH CARE EDUCATION/TRAINING PROGRAM

## 2022-09-27 PROCEDURE — 1123F ACP DISCUSS/DSCN MKR DOCD: CPT | Performed by: STUDENT IN AN ORGANIZED HEALTH CARE EDUCATION/TRAINING PROGRAM

## 2022-09-27 NOTE — PROGRESS NOTES
Hospital Follow-up     This is a 80year old female who presents to the office for a 3 month follow-up s/p T6, T7 and T8 fractures. Subjective: Patient states she feels good. Denies any significant back pain. No pain down the legs. No numbness or weakness. No bowel or bladder incontience. Brace complaint. XR reviewed with patient. Physical Exam:              WDWN, no apparent distress              Non-labored breathing               Vitals Stable              Alert and oriented x3              CN 3-12 intact              PERRL              EOMI              CAIN well              Motor strength symmetric              Sensation to LT intact bilaterally   In TLSO brace                  Imagin2022 XR Thoracic Spine  Thoracic fractures seen, stable, no acute processes noted-final read pending. Assessment: This is a 80 y.o.  female presenting for a 3 month follow-up s/p T6,T7 and T8 fractures. Plan:  -Pain control and expectations discussed  -Can discontinue brace and restrictions   -OARRS report reviewed   -Follow-up in neurosurgery clinic prn  -Call or return to neurosurgery office sooner if symptoms worsen or if new issues arise in the interim.     Electronically signed by Robbie Bueno PA-C on 2022 at 5:00 PM

## 2023-04-05 ENCOUNTER — HOSPITAL ENCOUNTER (OUTPATIENT)
Dept: NON INVASIVE DIAGNOSTICS | Age: 83
Discharge: HOME | End: 2023-04-05
Payer: MEDICARE

## 2023-04-05 ENCOUNTER — HOSPITAL ENCOUNTER (OUTPATIENT)
Dept: NUCLEAR MEDICINE | Age: 83
Discharge: HOME | End: 2023-04-05
Payer: MEDICARE

## 2023-04-05 VITALS — BODY MASS INDEX: 23.09 KG/M2 | HEIGHT: 58 IN | WEIGHT: 110 LBS

## 2023-04-05 LAB
LV EF: 79 %
LVEF MODALITY: NORMAL

## 2023-04-05 PROCEDURE — A9500 TC99M SESTAMIBI: HCPCS | Performed by: RADIOLOGY

## 2023-04-05 PROCEDURE — 93017 CV STRESS TEST TRACING ONLY: CPT

## 2023-04-05 PROCEDURE — 78452 HT MUSCLE IMAGE SPECT MULT: CPT

## 2023-04-05 PROCEDURE — 3430000000 HC RX DIAGNOSTIC RADIOPHARMACEUTICAL: Performed by: RADIOLOGY

## 2023-04-05 PROCEDURE — 6360000002 HC RX W HCPCS: Performed by: INTERNAL MEDICINE

## 2023-04-05 RX ORDER — TETRAKIS(2-METHOXYISOBUTYLISOCYANIDE)COPPER(I) TETRAFLUOROBORATE 1 MG/ML
30 INJECTION, POWDER, LYOPHILIZED, FOR SOLUTION INTRAVENOUS
Status: COMPLETED | OUTPATIENT
Start: 2023-04-05 | End: 2023-04-05

## 2023-04-05 RX ORDER — TETRAKIS(2-METHOXYISOBUTYLISOCYANIDE)COPPER(I) TETRAFLUOROBORATE 1 MG/ML
10 INJECTION, POWDER, LYOPHILIZED, FOR SOLUTION INTRAVENOUS
Status: COMPLETED | OUTPATIENT
Start: 2023-04-05 | End: 2023-04-05

## 2023-04-05 RX ADMIN — Medication 30 MILLICURIE: at 10:30

## 2023-04-05 RX ADMIN — Medication 10 MILLICURIE: at 09:06

## 2023-04-05 RX ADMIN — REGADENOSON 0.4 MG: 0.08 INJECTION, SOLUTION INTRAVENOUS at 10:45

## 2023-05-18 ENCOUNTER — APPOINTMENT (OUTPATIENT)
Dept: CT IMAGING | Age: 83
DRG: 544 | End: 2023-05-18
Payer: MEDICARE

## 2023-05-18 LAB
ALBUMIN SERPL-MCNC: 4 G/DL (ref 3.5–5.2)
ALP SERPL-CCNC: 98 U/L (ref 35–104)
ALT SERPL-CCNC: 8 U/L (ref 0–32)
ANION GAP SERPL CALCULATED.3IONS-SCNC: 8 MMOL/L (ref 7–16)
AST SERPL-CCNC: 23 U/L (ref 0–31)
BACTERIA URNS QL MICRO: ABNORMAL /HPF
BASOPHILS # BLD: 0.04 E9/L (ref 0–0.2)
BASOPHILS NFR BLD: 0.7 % (ref 0–2)
BILIRUB SERPL-MCNC: 0.5 MG/DL (ref 0–1.2)
BILIRUB UR QL STRIP: NEGATIVE
BUN SERPL-MCNC: 18 MG/DL (ref 6–23)
CALCIUM SERPL-MCNC: 9.8 MG/DL (ref 8.6–10.2)
CHLORIDE SERPL-SCNC: 98 MMOL/L (ref 98–107)
CLARITY UR: CLEAR
CO2 SERPL-SCNC: 28 MMOL/L (ref 22–29)
COLOR UR: YELLOW
CREAT SERPL-MCNC: 0.8 MG/DL (ref 0.5–1)
EOSINOPHIL # BLD: 0.13 E9/L (ref 0.05–0.5)
EOSINOPHIL NFR BLD: 2.4 % (ref 0–6)
ERYTHROCYTE [DISTWIDTH] IN BLOOD BY AUTOMATED COUNT: 13.3 FL (ref 11.5–15)
GLUCOSE SERPL-MCNC: 118 MG/DL (ref 74–99)
GLUCOSE UR STRIP-MCNC: NEGATIVE MG/DL
HCT VFR BLD AUTO: 46.5 % (ref 34–48)
HGB BLD-MCNC: 14.5 G/DL (ref 11.5–15.5)
HGB UR QL STRIP: NEGATIVE
IMM GRANULOCYTES # BLD: 0.01 E9/L
IMM GRANULOCYTES NFR BLD: 0.2 % (ref 0–5)
KETONES UR STRIP-MCNC: NEGATIVE MG/DL
LACTATE BLDV-SCNC: 1.2 MMOL/L (ref 0.5–2.2)
LEUKOCYTE ESTERASE UR QL STRIP: NEGATIVE
LIPASE: 31 U/L (ref 13–60)
LYMPHOCYTES # BLD: 0.82 E9/L (ref 1.5–4)
LYMPHOCYTES NFR BLD: 15.2 % (ref 20–42)
MCH RBC QN AUTO: 29.4 PG (ref 26–35)
MCHC RBC AUTO-ENTMCNC: 31.2 % (ref 32–34.5)
MCV RBC AUTO: 94.1 FL (ref 80–99.9)
MONOCYTES # BLD: 0.56 E9/L (ref 0.1–0.95)
MONOCYTES NFR BLD: 10.4 % (ref 2–12)
NEUTROPHILS # BLD: 3.84 E9/L (ref 1.8–7.3)
NEUTS SEG NFR BLD: 71.1 % (ref 43–80)
NITRITE UR QL STRIP: NEGATIVE
PH UR STRIP: 5.5 [PH] (ref 5–9)
PLATELET # BLD AUTO: 187 E9/L (ref 130–450)
PMV BLD AUTO: 10.6 FL (ref 7–12)
POTASSIUM SERPL-SCNC: 4.7 MMOL/L (ref 3.5–5)
PROT SERPL-MCNC: 6.9 G/DL (ref 6.4–8.3)
PROT UR STRIP-MCNC: NEGATIVE MG/DL
RBC # BLD AUTO: 4.94 E12/L (ref 3.5–5.5)
RBC #/AREA URNS HPF: ABNORMAL /HPF (ref 0–2)
SODIUM SERPL-SCNC: 134 MMOL/L (ref 132–146)
SP GR UR STRIP: 1.02 (ref 1–1.03)
UROBILINOGEN UR STRIP-ACNC: 4 E.U./DL
WBC # BLD: 5.4 E9/L (ref 4.5–11.5)
WBC #/AREA URNS HPF: ABNORMAL /HPF (ref 0–5)

## 2023-05-18 PROCEDURE — 6370000000 HC RX 637 (ALT 250 FOR IP): Performed by: NURSE PRACTITIONER

## 2023-05-18 PROCEDURE — 96374 THER/PROPH/DIAG INJ IV PUSH: CPT

## 2023-05-18 PROCEDURE — 85025 COMPLETE CBC W/AUTO DIFF WBC: CPT

## 2023-05-18 PROCEDURE — 81001 URINALYSIS AUTO W/SCOPE: CPT

## 2023-05-18 PROCEDURE — 83605 ASSAY OF LACTIC ACID: CPT

## 2023-05-18 PROCEDURE — 99285 EMERGENCY DEPT VISIT HI MDM: CPT

## 2023-05-18 PROCEDURE — 80053 COMPREHEN METABOLIC PANEL: CPT

## 2023-05-18 PROCEDURE — 83690 ASSAY OF LIPASE: CPT

## 2023-05-18 PROCEDURE — 74176 CT ABD & PELVIS W/O CONTRAST: CPT

## 2023-05-18 RX ORDER — LIDOCAINE 4 G/G
1 PATCH TOPICAL ONCE
Status: COMPLETED | OUTPATIENT
Start: 2023-05-18 | End: 2023-05-19

## 2023-05-18 RX ORDER — LIDOCAINE 4 G/G
1 PATCH TOPICAL DAILY
Status: DISCONTINUED | OUTPATIENT
Start: 2023-05-19 | End: 2023-05-18

## 2023-05-19 ENCOUNTER — HOSPITAL ENCOUNTER (INPATIENT)
Age: 83
LOS: 5 days | Discharge: HOME | DRG: 544 | End: 2023-05-24
Attending: EMERGENCY MEDICINE | Admitting: FAMILY MEDICINE
Payer: MEDICARE

## 2023-05-19 DIAGNOSIS — R52 INTRACTABLE PAIN: ICD-10-CM

## 2023-05-19 DIAGNOSIS — S22.000A COMPRESSION FRACTURE OF BODY OF THORACIC VERTEBRA (HCC): Primary | ICD-10-CM

## 2023-05-19 PROBLEM — M48.54XA COMPRESSION FRACTURE OF THORACIC SPINE, NON-TRAUMATIC, INITIAL ENCOUNTER (HCC): Status: ACTIVE | Noted: 2023-05-19

## 2023-05-19 LAB
ALBUMIN SERPL-MCNC: 3.7 G/DL (ref 3.5–5.2)
ALP SERPL-CCNC: 88 U/L (ref 35–104)
ALT SERPL-CCNC: 7 U/L (ref 0–32)
ANION GAP SERPL CALCULATED.3IONS-SCNC: 9 MMOL/L (ref 7–16)
AST SERPL-CCNC: 25 U/L (ref 0–31)
BASOPHILS # BLD: 0.03 E9/L (ref 0–0.2)
BASOPHILS NFR BLD: 0.8 % (ref 0–2)
BILIRUB SERPL-MCNC: 0.6 MG/DL (ref 0–1.2)
BUN SERPL-MCNC: 16 MG/DL (ref 6–23)
CALCIUM SERPL-MCNC: 9.4 MG/DL (ref 8.6–10.2)
CHLORIDE SERPL-SCNC: 103 MMOL/L (ref 98–107)
CO2 SERPL-SCNC: 27 MMOL/L (ref 22–29)
CREAT SERPL-MCNC: 0.9 MG/DL (ref 0.5–1)
EOSINOPHIL # BLD: 0.15 E9/L (ref 0.05–0.5)
EOSINOPHIL NFR BLD: 4.1 % (ref 0–6)
ERYTHROCYTE [DISTWIDTH] IN BLOOD BY AUTOMATED COUNT: 13.3 FL (ref 11.5–15)
GLUCOSE SERPL-MCNC: 89 MG/DL (ref 74–99)
HCT VFR BLD AUTO: 45 % (ref 34–48)
HGB BLD-MCNC: 14 G/DL (ref 11.5–15.5)
IMM GRANULOCYTES # BLD: 0.01 E9/L
IMM GRANULOCYTES NFR BLD: 0.3 % (ref 0–5)
LYMPHOCYTES # BLD: 0.89 E9/L (ref 1.5–4)
LYMPHOCYTES NFR BLD: 24.4 % (ref 20–42)
MCH RBC QN AUTO: 29.4 PG (ref 26–35)
MCHC RBC AUTO-ENTMCNC: 31.1 % (ref 32–34.5)
MCV RBC AUTO: 94.5 FL (ref 80–99.9)
MONOCYTES # BLD: 0.5 E9/L (ref 0.1–0.95)
MONOCYTES NFR BLD: 13.7 % (ref 2–12)
NEUTROPHILS # BLD: 2.07 E9/L (ref 1.8–7.3)
NEUTS SEG NFR BLD: 56.7 % (ref 43–80)
PLATELET # BLD AUTO: 174 E9/L (ref 130–450)
PMV BLD AUTO: 11.1 FL (ref 7–12)
POTASSIUM SERPL-SCNC: 4.2 MMOL/L (ref 3.5–5)
PROT SERPL-MCNC: 6.6 G/DL (ref 6.4–8.3)
RBC # BLD AUTO: 4.76 E12/L (ref 3.5–5.5)
SODIUM SERPL-SCNC: 139 MMOL/L (ref 132–146)
WBC # BLD: 3.7 E9/L (ref 4.5–11.5)

## 2023-05-19 PROCEDURE — 6360000002 HC RX W HCPCS: Performed by: INTERNAL MEDICINE

## 2023-05-19 PROCEDURE — 6370000000 HC RX 637 (ALT 250 FOR IP): Performed by: INTERNAL MEDICINE

## 2023-05-19 PROCEDURE — 94664 DEMO&/EVAL PT USE INHALER: CPT

## 2023-05-19 PROCEDURE — 80053 COMPREHEN METABOLIC PANEL: CPT

## 2023-05-19 PROCEDURE — 2580000003 HC RX 258: Performed by: INTERNAL MEDICINE

## 2023-05-19 PROCEDURE — 2700000000 HC OXYGEN THERAPY PER DAY

## 2023-05-19 PROCEDURE — 85025 COMPLETE CBC W/AUTO DIFF WBC: CPT

## 2023-05-19 PROCEDURE — 6360000002 HC RX W HCPCS: Performed by: NURSE PRACTITIONER

## 2023-05-19 PROCEDURE — 94640 AIRWAY INHALATION TREATMENT: CPT

## 2023-05-19 PROCEDURE — 36415 COLL VENOUS BLD VENIPUNCTURE: CPT

## 2023-05-19 PROCEDURE — 1200000000 HC SEMI PRIVATE

## 2023-05-19 RX ORDER — SODIUM CHLORIDE 0.9 % (FLUSH) 0.9 %
10 SYRINGE (ML) INJECTION EVERY 12 HOURS SCHEDULED
Status: DISCONTINUED | OUTPATIENT
Start: 2023-05-19 | End: 2023-05-24 | Stop reason: HOSPADM

## 2023-05-19 RX ORDER — SODIUM CHLORIDE 0.9 % (FLUSH) 0.9 %
10 SYRINGE (ML) INJECTION PRN
Status: DISCONTINUED | OUTPATIENT
Start: 2023-05-19 | End: 2023-05-24 | Stop reason: HOSPADM

## 2023-05-19 RX ORDER — FUROSEMIDE 20 MG/1
20 TABLET ORAL
Status: DISCONTINUED | OUTPATIENT
Start: 2023-05-19 | End: 2023-05-24 | Stop reason: HOSPADM

## 2023-05-19 RX ORDER — ALBUTEROL SULFATE 2.5 MG/3ML
2.5 SOLUTION RESPIRATORY (INHALATION) EVERY 6 HOURS PRN
Status: DISCONTINUED | OUTPATIENT
Start: 2023-05-19 | End: 2023-05-24 | Stop reason: HOSPADM

## 2023-05-19 RX ORDER — SODIUM CHLORIDE 9 MG/ML
INJECTION, SOLUTION INTRAVENOUS PRN
Status: DISCONTINUED | OUTPATIENT
Start: 2023-05-19 | End: 2023-05-24 | Stop reason: HOSPADM

## 2023-05-19 RX ORDER — FERROUS SULFATE 325(65) MG
325 TABLET ORAL
Status: DISCONTINUED | OUTPATIENT
Start: 2023-05-19 | End: 2023-05-24 | Stop reason: HOSPADM

## 2023-05-19 RX ORDER — ARFORMOTEROL TARTRATE 15 UG/2ML
15 SOLUTION RESPIRATORY (INHALATION) 2 TIMES DAILY
Status: DISCONTINUED | OUTPATIENT
Start: 2023-05-19 | End: 2023-05-24 | Stop reason: HOSPADM

## 2023-05-19 RX ORDER — SENNA PLUS 8.6 MG/1
1 TABLET ORAL DAILY PRN
Status: DISCONTINUED | OUTPATIENT
Start: 2023-05-19 | End: 2023-05-24 | Stop reason: HOSPADM

## 2023-05-19 RX ORDER — ENOXAPARIN SODIUM 100 MG/ML
30 INJECTION SUBCUTANEOUS DAILY
Status: DISCONTINUED | OUTPATIENT
Start: 2023-05-19 | End: 2023-05-19

## 2023-05-19 RX ORDER — ROSUVASTATIN CALCIUM 10 MG/1
10 TABLET, COATED ORAL DAILY
Status: DISCONTINUED | OUTPATIENT
Start: 2023-05-19 | End: 2023-05-24 | Stop reason: HOSPADM

## 2023-05-19 RX ORDER — DILTIAZEM HYDROCHLORIDE 120 MG/1
120 CAPSULE, COATED, EXTENDED RELEASE ORAL DAILY
Status: DISCONTINUED | OUTPATIENT
Start: 2023-05-19 | End: 2023-05-21

## 2023-05-19 RX ORDER — ACETAMINOPHEN 325 MG/1
650 TABLET ORAL EVERY 6 HOURS PRN
Status: DISCONTINUED | OUTPATIENT
Start: 2023-05-19 | End: 2023-05-24 | Stop reason: HOSPADM

## 2023-05-19 RX ORDER — ACETAMINOPHEN 650 MG/1
650 SUPPOSITORY RECTAL EVERY 6 HOURS PRN
Status: DISCONTINUED | OUTPATIENT
Start: 2023-05-19 | End: 2023-05-24 | Stop reason: HOSPADM

## 2023-05-19 RX ORDER — LISINOPRIL 10 MG/1
10 TABLET ORAL DAILY
Status: DISCONTINUED | OUTPATIENT
Start: 2023-05-19 | End: 2023-05-21

## 2023-05-19 RX ORDER — MORPHINE SULFATE 2 MG/ML
2 INJECTION, SOLUTION INTRAMUSCULAR; INTRAVENOUS ONCE
Status: COMPLETED | OUTPATIENT
Start: 2023-05-19 | End: 2023-05-19

## 2023-05-19 RX ORDER — HYDROCODONE BITARTRATE AND ACETAMINOPHEN 5; 325 MG/1; MG/1
1 TABLET ORAL EVERY 6 HOURS PRN
Status: DISCONTINUED | OUTPATIENT
Start: 2023-05-19 | End: 2023-05-24 | Stop reason: HOSPADM

## 2023-05-19 RX ADMIN — IPRATROPIUM BROMIDE 0.5 MG: 0.5 SOLUTION RESPIRATORY (INHALATION) at 11:14

## 2023-05-19 RX ADMIN — IPRATROPIUM BROMIDE 0.5 MG: 0.5 SOLUTION RESPIRATORY (INHALATION) at 07:18

## 2023-05-19 RX ADMIN — LISINOPRIL 10 MG: 10 TABLET ORAL at 09:16

## 2023-05-19 RX ADMIN — DILTIAZEM HYDROCHLORIDE 120 MG: 120 CAPSULE, COATED, EXTENDED RELEASE ORAL at 09:14

## 2023-05-19 RX ADMIN — ARFORMOTEROL TARTRATE 15 MCG: 15 SOLUTION RESPIRATORY (INHALATION) at 20:25

## 2023-05-19 RX ADMIN — FERROUS SULFATE TAB 325 MG (65 MG ELEMENTAL FE) 325 MG: 325 (65 FE) TAB at 09:16

## 2023-05-19 RX ADMIN — IPRATROPIUM BROMIDE 0.5 MG: 0.5 SOLUTION RESPIRATORY (INHALATION) at 20:25

## 2023-05-19 RX ADMIN — ROSUVASTATIN 10 MG: 10 TABLET, FILM COATED ORAL at 09:15

## 2023-05-19 RX ADMIN — FUROSEMIDE 20 MG: 20 TABLET ORAL at 09:16

## 2023-05-19 RX ADMIN — ARFORMOTEROL TARTRATE 15 MCG: 15 SOLUTION RESPIRATORY (INHALATION) at 07:18

## 2023-05-19 RX ADMIN — APIXABAN 2.5 MG: 2.5 TABLET, FILM COATED ORAL at 20:14

## 2023-05-19 RX ADMIN — APIXABAN 2.5 MG: 2.5 TABLET, FILM COATED ORAL at 09:16

## 2023-05-19 RX ADMIN — IPRATROPIUM BROMIDE 0.5 MG: 0.5 SOLUTION RESPIRATORY (INHALATION) at 16:44

## 2023-05-19 RX ADMIN — Medication 10 ML: at 20:14

## 2023-05-19 RX ADMIN — MORPHINE SULFATE 2 MG: 2 INJECTION, SOLUTION INTRAMUSCULAR; INTRAVENOUS at 02:39

## 2023-05-19 ASSESSMENT — ENCOUNTER SYMPTOMS
NAUSEA: 0
BACK PAIN: 1
DIARRHEA: 0
BLOOD IN STOOL: 0
COLOR CHANGE: 0
ABDOMINAL PAIN: 0
VOMITING: 0
COUGH: 0
PHOTOPHOBIA: 0
CONSTIPATION: 0
CHOKING: 0
WHEEZING: 0
SHORTNESS OF BREATH: 0
ABDOMINAL DISTENTION: 1

## 2023-05-19 ASSESSMENT — PAIN SCALES - GENERAL: PAINLEVEL_OUTOF10: 5

## 2023-05-19 ASSESSMENT — PAIN DESCRIPTION - LOCATION: LOCATION: ABDOMEN;BACK

## 2023-05-19 ASSESSMENT — LIFESTYLE VARIABLES
HOW MANY STANDARD DRINKS CONTAINING ALCOHOL DO YOU HAVE ON A TYPICAL DAY: 1 OR 2
HOW OFTEN DO YOU HAVE A DRINK CONTAINING ALCOHOL: MONTHLY OR LESS

## 2023-05-19 ASSESSMENT — PAIN DESCRIPTION - DESCRIPTORS: DESCRIPTORS: ACHING;DISCOMFORT;STABBING

## 2023-05-20 ENCOUNTER — APPOINTMENT (OUTPATIENT)
Dept: MRI IMAGING | Age: 83
DRG: 544 | End: 2023-05-20
Payer: MEDICARE

## 2023-05-20 LAB
ALBUMIN SERPL-MCNC: 3.3 G/DL (ref 3.5–5.2)
ALP SERPL-CCNC: 76 U/L (ref 35–104)
ALT SERPL-CCNC: 7 U/L (ref 0–32)
ANION GAP SERPL CALCULATED.3IONS-SCNC: 11 MMOL/L (ref 7–16)
AST SERPL-CCNC: 23 U/L (ref 0–31)
BASOPHILS # BLD: 0.03 E9/L (ref 0–0.2)
BASOPHILS NFR BLD: 0.6 % (ref 0–2)
BILIRUB SERPL-MCNC: 0.4 MG/DL (ref 0–1.2)
BUN SERPL-MCNC: 15 MG/DL (ref 6–23)
CALCIUM SERPL-MCNC: 9 MG/DL (ref 8.6–10.2)
CHLORIDE SERPL-SCNC: 102 MMOL/L (ref 98–107)
CO2 SERPL-SCNC: 27 MMOL/L (ref 22–29)
CREAT SERPL-MCNC: 0.9 MG/DL (ref 0.5–1)
EOSINOPHIL # BLD: 0.14 E9/L (ref 0.05–0.5)
EOSINOPHIL NFR BLD: 3 % (ref 0–6)
ERYTHROCYTE [DISTWIDTH] IN BLOOD BY AUTOMATED COUNT: 13.3 FL (ref 11.5–15)
GLUCOSE SERPL-MCNC: 81 MG/DL (ref 74–99)
HCT VFR BLD AUTO: 40.3 % (ref 34–48)
HGB BLD-MCNC: 12.9 G/DL (ref 11.5–15.5)
IMM GRANULOCYTES # BLD: 0.01 E9/L
IMM GRANULOCYTES NFR BLD: 0.2 % (ref 0–5)
LYMPHOCYTES # BLD: 1.25 E9/L (ref 1.5–4)
LYMPHOCYTES NFR BLD: 26.7 % (ref 20–42)
MCH RBC QN AUTO: 29.5 PG (ref 26–35)
MCHC RBC AUTO-ENTMCNC: 32 % (ref 32–34.5)
MCV RBC AUTO: 92.2 FL (ref 80–99.9)
MONOCYTES # BLD: 0.57 E9/L (ref 0.1–0.95)
MONOCYTES NFR BLD: 12.2 % (ref 2–12)
NEUTROPHILS # BLD: 2.68 E9/L (ref 1.8–7.3)
NEUTS SEG NFR BLD: 57.3 % (ref 43–80)
PLATELET # BLD AUTO: 173 E9/L (ref 130–450)
PMV BLD AUTO: 11.3 FL (ref 7–12)
POTASSIUM SERPL-SCNC: 3.8 MMOL/L (ref 3.5–5)
PROT SERPL-MCNC: 5.9 G/DL (ref 6.4–8.3)
RBC # BLD AUTO: 4.37 E12/L (ref 3.5–5.5)
SODIUM SERPL-SCNC: 140 MMOL/L (ref 132–146)
WBC # BLD: 4.7 E9/L (ref 4.5–11.5)

## 2023-05-20 PROCEDURE — 94640 AIRWAY INHALATION TREATMENT: CPT

## 2023-05-20 PROCEDURE — 85025 COMPLETE CBC W/AUTO DIFF WBC: CPT

## 2023-05-20 PROCEDURE — 2580000003 HC RX 258: Performed by: INTERNAL MEDICINE

## 2023-05-20 PROCEDURE — 6360000002 HC RX W HCPCS: Performed by: INTERNAL MEDICINE

## 2023-05-20 PROCEDURE — 1200000000 HC SEMI PRIVATE

## 2023-05-20 PROCEDURE — 6370000000 HC RX 637 (ALT 250 FOR IP): Performed by: INTERNAL MEDICINE

## 2023-05-20 PROCEDURE — 80053 COMPREHEN METABOLIC PANEL: CPT

## 2023-05-20 PROCEDURE — 36415 COLL VENOUS BLD VENIPUNCTURE: CPT

## 2023-05-20 PROCEDURE — 72146 MRI CHEST SPINE W/O DYE: CPT

## 2023-05-20 RX ORDER — LORAZEPAM 2 MG/ML
0.5 INJECTION INTRAMUSCULAR ONCE
Status: COMPLETED | OUTPATIENT
Start: 2023-05-20 | End: 2023-05-20

## 2023-05-20 RX ADMIN — APIXABAN 2.5 MG: 2.5 TABLET, FILM COATED ORAL at 20:16

## 2023-05-20 RX ADMIN — ROSUVASTATIN 10 MG: 10 TABLET, FILM COATED ORAL at 08:41

## 2023-05-20 RX ADMIN — ARFORMOTEROL TARTRATE 15 MCG: 15 SOLUTION RESPIRATORY (INHALATION) at 18:11

## 2023-05-20 RX ADMIN — LISINOPRIL 10 MG: 10 TABLET ORAL at 08:41

## 2023-05-20 RX ADMIN — FERROUS SULFATE TAB 325 MG (65 MG ELEMENTAL FE) 325 MG: 325 (65 FE) TAB at 08:41

## 2023-05-20 RX ADMIN — Medication 10 ML: at 20:16

## 2023-05-20 RX ADMIN — Medication 10 ML: at 08:44

## 2023-05-20 RX ADMIN — HYDROCODONE BITARTRATE AND ACETAMINOPHEN 1 TABLET: 5; 325 TABLET ORAL at 22:59

## 2023-05-20 RX ADMIN — APIXABAN 2.5 MG: 2.5 TABLET, FILM COATED ORAL at 08:41

## 2023-05-20 RX ADMIN — IPRATROPIUM BROMIDE 0.5 MG: 0.5 SOLUTION RESPIRATORY (INHALATION) at 18:11

## 2023-05-20 RX ADMIN — DILTIAZEM HYDROCHLORIDE 120 MG: 120 CAPSULE, COATED, EXTENDED RELEASE ORAL at 08:41

## 2023-05-20 RX ADMIN — LORAZEPAM 0.5 MG: 2 INJECTION INTRAMUSCULAR; INTRAVENOUS at 11:57

## 2023-05-20 ASSESSMENT — PAIN SCALES - GENERAL
PAINLEVEL_OUTOF10: 10
PAINLEVEL_OUTOF10: 10

## 2023-05-20 ASSESSMENT — ENCOUNTER SYMPTOMS
VOMITING: 0
NAUSEA: 0
COUGH: 0
SHORTNESS OF BREATH: 0
DIARRHEA: 0

## 2023-05-21 LAB
ALBUMIN SERPL-MCNC: 3.4 G/DL (ref 3.5–5.2)
ALP SERPL-CCNC: 79 U/L (ref 35–104)
ALT SERPL-CCNC: 6 U/L (ref 0–32)
ANION GAP SERPL CALCULATED.3IONS-SCNC: 8 MMOL/L (ref 7–16)
AST SERPL-CCNC: 21 U/L (ref 0–31)
BASOPHILS # BLD: 0.04 E9/L (ref 0–0.2)
BASOPHILS NFR BLD: 0.8 % (ref 0–2)
BILIRUB SERPL-MCNC: 0.4 MG/DL (ref 0–1.2)
BUN SERPL-MCNC: 14 MG/DL (ref 6–23)
CALCIUM SERPL-MCNC: 9.2 MG/DL (ref 8.6–10.2)
CHLORIDE SERPL-SCNC: 101 MMOL/L (ref 98–107)
CO2 SERPL-SCNC: 29 MMOL/L (ref 22–29)
CREAT SERPL-MCNC: 0.9 MG/DL (ref 0.5–1)
EOSINOPHIL # BLD: 0.18 E9/L (ref 0.05–0.5)
EOSINOPHIL NFR BLD: 3.4 % (ref 0–6)
ERYTHROCYTE [DISTWIDTH] IN BLOOD BY AUTOMATED COUNT: 13.2 FL (ref 11.5–15)
GLUCOSE SERPL-MCNC: 91 MG/DL (ref 74–99)
HCT VFR BLD AUTO: 41.8 % (ref 34–48)
HGB BLD-MCNC: 13.4 G/DL (ref 11.5–15.5)
IMM GRANULOCYTES # BLD: 0.02 E9/L
IMM GRANULOCYTES NFR BLD: 0.4 % (ref 0–5)
LYMPHOCYTES # BLD: 1.67 E9/L (ref 1.5–4)
LYMPHOCYTES NFR BLD: 31.8 % (ref 20–42)
MCH RBC QN AUTO: 30 PG (ref 26–35)
MCHC RBC AUTO-ENTMCNC: 32.1 % (ref 32–34.5)
MCV RBC AUTO: 93.7 FL (ref 80–99.9)
MONOCYTES # BLD: 0.6 E9/L (ref 0.1–0.95)
MONOCYTES NFR BLD: 11.4 % (ref 2–12)
NEUTROPHILS # BLD: 2.74 E9/L (ref 1.8–7.3)
NEUTS SEG NFR BLD: 52.2 % (ref 43–80)
PLATELET # BLD AUTO: 167 E9/L (ref 130–450)
PMV BLD AUTO: 10.8 FL (ref 7–12)
POTASSIUM SERPL-SCNC: 4.4 MMOL/L (ref 3.5–5)
PROT SERPL-MCNC: 5.9 G/DL (ref 6.4–8.3)
RBC # BLD AUTO: 4.46 E12/L (ref 3.5–5.5)
SODIUM SERPL-SCNC: 138 MMOL/L (ref 132–146)
WBC # BLD: 5.3 E9/L (ref 4.5–11.5)

## 2023-05-21 PROCEDURE — 2580000003 HC RX 258: Performed by: INTERNAL MEDICINE

## 2023-05-21 PROCEDURE — 97530 THERAPEUTIC ACTIVITIES: CPT

## 2023-05-21 PROCEDURE — 1200000000 HC SEMI PRIVATE

## 2023-05-21 PROCEDURE — 80053 COMPREHEN METABOLIC PANEL: CPT

## 2023-05-21 PROCEDURE — 36415 COLL VENOUS BLD VENIPUNCTURE: CPT

## 2023-05-21 PROCEDURE — 97161 PT EVAL LOW COMPLEX 20 MIN: CPT

## 2023-05-21 PROCEDURE — 6370000000 HC RX 637 (ALT 250 FOR IP): Performed by: INTERNAL MEDICINE

## 2023-05-21 PROCEDURE — 6360000002 HC RX W HCPCS: Performed by: INTERNAL MEDICINE

## 2023-05-21 PROCEDURE — 85025 COMPLETE CBC W/AUTO DIFF WBC: CPT

## 2023-05-21 PROCEDURE — 97165 OT EVAL LOW COMPLEX 30 MIN: CPT

## 2023-05-21 PROCEDURE — 94640 AIRWAY INHALATION TREATMENT: CPT

## 2023-05-21 PROCEDURE — 97535 SELF CARE MNGMENT TRAINING: CPT

## 2023-05-21 RX ORDER — LISINOPRIL 10 MG/1
10 TABLET ORAL DAILY
Status: DISCONTINUED | OUTPATIENT
Start: 2023-05-22 | End: 2023-05-24 | Stop reason: HOSPADM

## 2023-05-21 RX ORDER — DILTIAZEM HYDROCHLORIDE 120 MG/1
120 CAPSULE, COATED, EXTENDED RELEASE ORAL DAILY
Status: DISCONTINUED | OUTPATIENT
Start: 2023-05-22 | End: 2023-05-24 | Stop reason: HOSPADM

## 2023-05-21 RX ADMIN — FERROUS SULFATE TAB 325 MG (65 MG ELEMENTAL FE) 325 MG: 325 (65 FE) TAB at 08:19

## 2023-05-21 RX ADMIN — ROSUVASTATIN 10 MG: 10 TABLET, FILM COATED ORAL at 08:19

## 2023-05-21 RX ADMIN — Medication 10 ML: at 20:31

## 2023-05-21 RX ADMIN — IPRATROPIUM BROMIDE 0.5 MG: 0.5 SOLUTION RESPIRATORY (INHALATION) at 09:10

## 2023-05-21 RX ADMIN — ARFORMOTEROL TARTRATE 15 MCG: 15 SOLUTION RESPIRATORY (INHALATION) at 09:10

## 2023-05-21 RX ADMIN — IPRATROPIUM BROMIDE 0.5 MG: 0.5 SOLUTION RESPIRATORY (INHALATION) at 19:58

## 2023-05-21 RX ADMIN — APIXABAN 2.5 MG: 2.5 TABLET, FILM COATED ORAL at 20:31

## 2023-05-21 RX ADMIN — APIXABAN 2.5 MG: 2.5 TABLET, FILM COATED ORAL at 08:19

## 2023-05-21 RX ADMIN — Medication 10 ML: at 08:19

## 2023-05-21 RX ADMIN — ARFORMOTEROL TARTRATE 15 MCG: 15 SOLUTION RESPIRATORY (INHALATION) at 19:58

## 2023-05-21 RX ADMIN — IPRATROPIUM BROMIDE 0.5 MG: 0.5 SOLUTION RESPIRATORY (INHALATION) at 16:27

## 2023-05-21 RX ADMIN — SENNOSIDES 8.6 MG: 8.6 TABLET, FILM COATED ORAL at 20:52

## 2023-05-21 ASSESSMENT — ENCOUNTER SYMPTOMS
SHORTNESS OF BREATH: 0
NAUSEA: 0
DIARRHEA: 0
VOMITING: 0
COUGH: 0

## 2023-05-21 ASSESSMENT — PAIN SCALES - GENERAL: PAINLEVEL_OUTOF10: 0

## 2023-05-22 PROCEDURE — 6360000002 HC RX W HCPCS: Performed by: INTERNAL MEDICINE

## 2023-05-22 PROCEDURE — 94640 AIRWAY INHALATION TREATMENT: CPT

## 2023-05-22 PROCEDURE — 1200000000 HC SEMI PRIVATE

## 2023-05-22 PROCEDURE — 97530 THERAPEUTIC ACTIVITIES: CPT

## 2023-05-22 PROCEDURE — 97535 SELF CARE MNGMENT TRAINING: CPT

## 2023-05-22 PROCEDURE — 6370000000 HC RX 637 (ALT 250 FOR IP): Performed by: FAMILY MEDICINE

## 2023-05-22 PROCEDURE — 6370000000 HC RX 637 (ALT 250 FOR IP): Performed by: INTERNAL MEDICINE

## 2023-05-22 PROCEDURE — 2580000003 HC RX 258: Performed by: INTERNAL MEDICINE

## 2023-05-22 RX ORDER — SODIUM PHOSPHATE, DIBASIC AND SODIUM PHOSPHATE, MONOBASIC 7; 19 G/133ML; G/133ML
1 ENEMA RECTAL
Status: COMPLETED | OUTPATIENT
Start: 2023-05-22 | End: 2023-05-22

## 2023-05-22 RX ORDER — BISACODYL 5 MG/1
5 TABLET, DELAYED RELEASE ORAL DAILY PRN
Status: DISCONTINUED | OUTPATIENT
Start: 2023-05-22 | End: 2023-05-24 | Stop reason: HOSPADM

## 2023-05-22 RX ORDER — POLYETHYLENE GLYCOL 3350 17 G/17G
17 POWDER, FOR SOLUTION ORAL DAILY
Status: DISCONTINUED | OUTPATIENT
Start: 2023-05-22 | End: 2023-05-24 | Stop reason: HOSPADM

## 2023-05-22 RX ADMIN — IPRATROPIUM BROMIDE 0.5 MG: 0.5 SOLUTION RESPIRATORY (INHALATION) at 11:01

## 2023-05-22 RX ADMIN — ROSUVASTATIN 10 MG: 10 TABLET, FILM COATED ORAL at 08:55

## 2023-05-22 RX ADMIN — IPRATROPIUM BROMIDE 0.5 MG: 0.5 SOLUTION RESPIRATORY (INHALATION) at 14:18

## 2023-05-22 RX ADMIN — POLYETHYLENE GLYCOL 3350 17 GRAM ORAL POWDER PACKET 17 G: at 10:06

## 2023-05-22 RX ADMIN — LISINOPRIL 10 MG: 10 TABLET ORAL at 08:55

## 2023-05-22 RX ADMIN — Medication 1 ENEMA: at 10:06

## 2023-05-22 RX ADMIN — Medication 10 ML: at 08:57

## 2023-05-22 RX ADMIN — APIXABAN 2.5 MG: 2.5 TABLET, FILM COATED ORAL at 20:29

## 2023-05-22 RX ADMIN — ARFORMOTEROL TARTRATE 15 MCG: 15 SOLUTION RESPIRATORY (INHALATION) at 11:01

## 2023-05-22 RX ADMIN — ARFORMOTEROL TARTRATE 15 MCG: 15 SOLUTION RESPIRATORY (INHALATION) at 21:39

## 2023-05-22 RX ADMIN — BISACODYL 5 MG: 5 TABLET, COATED ORAL at 17:10

## 2023-05-22 RX ADMIN — DILTIAZEM HYDROCHLORIDE 120 MG: 120 CAPSULE, COATED, EXTENDED RELEASE ORAL at 08:56

## 2023-05-22 RX ADMIN — FUROSEMIDE 20 MG: 20 TABLET ORAL at 08:55

## 2023-05-22 RX ADMIN — Medication 10 ML: at 20:29

## 2023-05-22 RX ADMIN — IPRATROPIUM BROMIDE 0.5 MG: 0.5 SOLUTION RESPIRATORY (INHALATION) at 21:39

## 2023-05-22 RX ADMIN — APIXABAN 2.5 MG: 2.5 TABLET, FILM COATED ORAL at 08:55

## 2023-05-22 RX ADMIN — IPRATROPIUM BROMIDE 0.5 MG: 0.5 SOLUTION RESPIRATORY (INHALATION) at 17:21

## 2023-05-22 RX ADMIN — FERROUS SULFATE TAB 325 MG (65 MG ELEMENTAL FE) 325 MG: 325 (65 FE) TAB at 08:55

## 2023-05-22 ASSESSMENT — ENCOUNTER SYMPTOMS
ABDOMINAL PAIN: 1
BACK PAIN: 1
SHORTNESS OF BREATH: 0

## 2023-05-22 ASSESSMENT — PAIN SCALES - GENERAL: PAINLEVEL_OUTOF10: 0

## 2023-05-22 NOTE — CARE COORDINATION
5/22. Met with the pt at the bedside to discuss transition of care. The pt lives alone and will return home when medically stable. Her PCP is Dr Kevan Barthel. She is usually active. She has her vehicle in the hospital parking lot. Will follow. Derrell Bashir RN    Case Management Assessment  Initial Evaluation    Date/Time of Evaluation: 5/22/2023 1:59 PM  Assessment Completed by: Derrell Bashir RN    If patient is discharged prior to next notation, then this note serves as note for discharge by case management. Patient Name: Lesley Daugherty                   YOB: 1940  Diagnosis: Intractable pain [R52]  Compression fracture of thoracic spine, non-traumatic, initial encounter [facility]) [M80.25XA]  Compression fracture of body of thoracic vertebra ([facility] [address] [S22.000A]                   Date / Time: 5/19/2023  2:21 AM    Patient Admission Status: Inpatient   Readmission Risk (Low < 19, Mod (19-27), High > 27): Readmission Risk Score: 10.8    Current PCP: Nisa Potts MD  PCP verified by CM? Yes    Chart Reviewed: Yes      History Provided by: Patient  Patient Orientation: Alert and Oriented    Patient Cognition: Alert    Hospitalization in the last 30 days (Readmission):  No    If yes, Readmission Assessment in CM Navigator will be completed. Advance Directives:      Code Status: Full Code   Patient's Primary Decision Maker is: Legal Next of Kin      Discharge Planning:    Patient lives with: Alone Type of Home: House  Primary Care Giver: Self  Patient Support Systems include: None   Current Financial resources:    Current community resources:    Current services prior to admission: None            Current DME:              Type of Home Care services:  PT, OT, Attendant    ADLS  Prior functional level: Independent in ADLs/IADLs  Current functional level: Independent in ADLs/IADLs    PT AM-PAC: 17 /24  OT AM-PAC: 23 /24    Family can provide assistance at DC:  Yes  Would you like Case

## 2023-05-22 NOTE — PLAN OF CARE
Problem: Chronic Conditions and Co-morbidities  Goal: Patient's chronic conditions and co-morbidity symptoms are monitored and maintained or improved  5/22/2023 0017 by Tiesha Garcia RN  Outcome: Progressing  5/21/2023 1218 by Sejal Hopkins RN  Outcome: Progressing     Problem: Discharge Planning  Goal: Discharge to home or other facility with appropriate resources  5/22/2023 0017 by Tiesha Garcia RN  Outcome: Progressing  5/21/2023 1218 by Sejal Hopkins RN  Outcome: Progressing     Problem: Safety - Adult  Goal: Free from fall injury  5/22/2023 0017 by Tiesha Garcia RN  Outcome: Progressing  5/21/2023 1218 by Sejal Hopkins RN  Outcome: Progressing     Problem: Pain  Goal: Verbalizes/displays adequate comfort level or baseline comfort level  Outcome: Progressing

## 2023-05-23 PROCEDURE — 6370000000 HC RX 637 (ALT 250 FOR IP): Performed by: INTERNAL MEDICINE

## 2023-05-23 PROCEDURE — 1200000000 HC SEMI PRIVATE

## 2023-05-23 PROCEDURE — 94640 AIRWAY INHALATION TREATMENT: CPT

## 2023-05-23 PROCEDURE — 2580000003 HC RX 258: Performed by: INTERNAL MEDICINE

## 2023-05-23 PROCEDURE — 6370000000 HC RX 637 (ALT 250 FOR IP): Performed by: FAMILY MEDICINE

## 2023-05-23 PROCEDURE — 6360000002 HC RX W HCPCS: Performed by: INTERNAL MEDICINE

## 2023-05-23 RX ORDER — SIMETHICONE 80 MG
80 TABLET,CHEWABLE ORAL EVERY 6 HOURS PRN
Status: DISCONTINUED | OUTPATIENT
Start: 2023-05-23 | End: 2023-05-24 | Stop reason: HOSPADM

## 2023-05-23 RX ORDER — LACTULOSE 10 G/15ML
20 SOLUTION ORAL 3 TIMES DAILY
Status: DISCONTINUED | OUTPATIENT
Start: 2023-05-23 | End: 2023-05-24 | Stop reason: HOSPADM

## 2023-05-23 RX ADMIN — IPRATROPIUM BROMIDE 0.5 MG: 0.5 SOLUTION RESPIRATORY (INHALATION) at 09:36

## 2023-05-23 RX ADMIN — IPRATROPIUM BROMIDE 0.5 MG: 0.5 SOLUTION RESPIRATORY (INHALATION) at 17:23

## 2023-05-23 RX ADMIN — APIXABAN 2.5 MG: 2.5 TABLET, FILM COATED ORAL at 09:00

## 2023-05-23 RX ADMIN — Medication 10 ML: at 09:00

## 2023-05-23 RX ADMIN — LACTULOSE 20 G: 20 SOLUTION ORAL at 17:16

## 2023-05-23 RX ADMIN — APIXABAN 2.5 MG: 2.5 TABLET, FILM COATED ORAL at 20:08

## 2023-05-23 RX ADMIN — IPRATROPIUM BROMIDE 0.5 MG: 0.5 SOLUTION RESPIRATORY (INHALATION) at 20:38

## 2023-05-23 RX ADMIN — POLYETHYLENE GLYCOL 3350 17 GRAM ORAL POWDER PACKET 17 G: at 09:00

## 2023-05-23 RX ADMIN — ROSUVASTATIN 10 MG: 10 TABLET, FILM COATED ORAL at 09:00

## 2023-05-23 RX ADMIN — ARFORMOTEROL TARTRATE 15 MCG: 15 SOLUTION RESPIRATORY (INHALATION) at 20:38

## 2023-05-23 RX ADMIN — LISINOPRIL 10 MG: 10 TABLET ORAL at 09:00

## 2023-05-23 RX ADMIN — FERROUS SULFATE TAB 325 MG (65 MG ELEMENTAL FE) 325 MG: 325 (65 FE) TAB at 09:00

## 2023-05-23 RX ADMIN — Medication 10 ML: at 20:08

## 2023-05-23 RX ADMIN — DILTIAZEM HYDROCHLORIDE 120 MG: 120 CAPSULE, COATED, EXTENDED RELEASE ORAL at 09:00

## 2023-05-23 RX ADMIN — ARFORMOTEROL TARTRATE 15 MCG: 15 SOLUTION RESPIRATORY (INHALATION) at 09:36

## 2023-05-23 ASSESSMENT — ENCOUNTER SYMPTOMS
SHORTNESS OF BREATH: 0
ABDOMINAL PAIN: 1
BACK PAIN: 1

## 2023-05-23 NOTE — CARE COORDINATION
Pt planning to return home at discharge with no anticipated needs. Her car is parked in the ED.  CM to follow (TF)    Jarred Lozada Staff  Case Management  [phone]

## 2023-05-24 VITALS
SYSTOLIC BLOOD PRESSURE: 124 MMHG | DIASTOLIC BLOOD PRESSURE: 87 MMHG | BODY MASS INDEX: 21.41 KG/M2 | TEMPERATURE: 97.3 F | HEIGHT: 58 IN | RESPIRATION RATE: 16 BRPM | HEART RATE: 83 BPM | OXYGEN SATURATION: 92 % | WEIGHT: 102 LBS

## 2023-05-24 PROCEDURE — 2580000003 HC RX 258: Performed by: INTERNAL MEDICINE

## 2023-05-24 PROCEDURE — 6360000002 HC RX W HCPCS: Performed by: INTERNAL MEDICINE

## 2023-05-24 PROCEDURE — 6370000000 HC RX 637 (ALT 250 FOR IP): Performed by: INTERNAL MEDICINE

## 2023-05-24 PROCEDURE — 94640 AIRWAY INHALATION TREATMENT: CPT

## 2023-05-24 PROCEDURE — 6370000000 HC RX 637 (ALT 250 FOR IP): Performed by: FAMILY MEDICINE

## 2023-05-24 RX ORDER — BISACODYL 5 MG/1
5 TABLET, DELAYED RELEASE ORAL DAILY PRN
Qty: 30 TABLET | Refills: 0 | Status: SHIPPED | OUTPATIENT
Start: 2023-05-24

## 2023-05-24 RX ORDER — SENNA PLUS 8.6 MG/1
1 TABLET ORAL DAILY PRN
Qty: 30 TABLET | Refills: 0 | Status: SHIPPED | OUTPATIENT
Start: 2023-05-24 | End: 2023-06-23

## 2023-05-24 RX ORDER — SIMETHICONE 80 MG
80 TABLET,CHEWABLE ORAL EVERY 6 HOURS PRN
Qty: 180 TABLET | Refills: 3 | Status: SHIPPED | OUTPATIENT
Start: 2023-05-24

## 2023-05-24 RX ORDER — POLYETHYLENE GLYCOL 3350 17 G/17G
17 POWDER, FOR SOLUTION ORAL DAILY
Qty: 527 G | Refills: 1 | Status: SHIPPED | OUTPATIENT
Start: 2023-05-25 | End: 2023-06-24

## 2023-05-24 RX ADMIN — ROSUVASTATIN 10 MG: 10 TABLET, FILM COATED ORAL at 08:03

## 2023-05-24 RX ADMIN — APIXABAN 2.5 MG: 2.5 TABLET, FILM COATED ORAL at 08:04

## 2023-05-24 RX ADMIN — FUROSEMIDE 20 MG: 20 TABLET ORAL at 08:04

## 2023-05-24 RX ADMIN — FERROUS SULFATE TAB 325 MG (65 MG ELEMENTAL FE) 325 MG: 325 (65 FE) TAB at 08:04

## 2023-05-24 RX ADMIN — ARFORMOTEROL TARTRATE 15 MCG: 15 SOLUTION RESPIRATORY (INHALATION) at 11:06

## 2023-05-24 RX ADMIN — Medication 10 ML: at 08:04

## 2023-05-24 RX ADMIN — POLYETHYLENE GLYCOL 3350 17 GRAM ORAL POWDER PACKET 17 G: at 08:04

## 2023-05-24 RX ADMIN — LACTULOSE 20 G: 20 SOLUTION ORAL at 08:04

## 2023-05-24 RX ADMIN — IPRATROPIUM BROMIDE 0.5 MG: 0.5 SOLUTION RESPIRATORY (INHALATION) at 11:06

## 2023-05-24 RX ADMIN — LISINOPRIL 10 MG: 10 TABLET ORAL at 08:04

## 2023-05-24 RX ADMIN — DILTIAZEM HYDROCHLORIDE 120 MG: 120 CAPSULE, COATED, EXTENDED RELEASE ORAL at 08:04

## 2023-05-24 ASSESSMENT — ENCOUNTER SYMPTOMS
BACK PAIN: 1
ABDOMINAL PAIN: 1
SHORTNESS OF BREATH: 0

## 2023-05-24 NOTE — DISCHARGE SUMMARY
Final  Eosinophils Absolute                          Date: 05/19/2023  Value: 0.15        Ref range: 0.05 - 0.50 E9/L   Status: Final  Basophils Absolute                            Date: 05/19/2023  Value: 0.03        Ref range: 0.00 - 0.20 E9/L   Status: Final  Sodium                                        Date: 05/19/2023  Value: 139         Ref range: 132 - 146 mmol/L   Status: Final  Potassium                                     Date: 05/19/2023  Value: 4.2         Ref range: 3.5 - 5.0 mmol/L   Status: Final  Chloride                                      Date: 05/19/2023  Value: 103         Ref range: 98 - 107 mmol/L    Status: Final  CO2                                           Date: 05/19/2023  Value: 27          Ref range: 22 - 29 mmol/L     Status: Final  Anion Gap                                     Date: 05/19/2023  Value: 9           Ref range: 7 - 16 mmol/L      Status: Final  Glucose                                       Date: 05/19/2023  Value: 89          Ref range: 74 - 99 mg/dL      Status: Final  BUN                                           Date: 05/19/2023  Value: 16          Ref range: 6 - 23 mg/dL       Status: Final  Creatinine                                    Date: 05/19/2023  Value: 0.9         Ref range: 0.5 - 1.0 mg/dL    Status: Final  Est, Glom Filt Rate                           Date: 05/19/2023  Value: >60         Ref range: >=60 mL/min/1.73   Status: Final                Comment: Pediatric calculator link  [email or web address]. org/professionals/kdoqi/gfr_calculatorped  Effective Oct 3, 2022  These results are not intended for use in patients  <25years of age. eGFR results are calculated without  a race factor using the 2021 CKD-EPI equation. Careful  clinical correlation is recommended, particularly when  comparing to results calculated using previous equations.   The CKD-EPI equation is less accurate in patients with  extremes of muscle mass, extra-renal metabolism of  creatinine,

## 2023-05-24 NOTE — PROGRESS NOTES
Dr. Dao Cloud regarding c/o constipation. Needing something more than senokot. Awaiting orders.
Occupational Therapy  OT BEDSIDE TREATMENT NOTE   [address] [facility] [address]  [address] [place] [address]      Date:2023  Patient Name: Taran Estrada  MRN: [id number]  : 1940  Room: [address] [place] [address]     Evaluating OT: Omar Serrano OTR/L #[id number]      Referring Provider: Sedrick Mercado DO  Specific Provider Orders/Date: OT eval and treat 23     Diagnosis: Intractable pain [R52]  Compression fracture of thoracic spine, non-traumatic, initial encounter ([facility] [M48.54XA]  Compression fracture of body of thoracic vertebra ([facility] [S22.000A]   Pt admitted to hospital with back pain. + T11 compression fx. OK for OOB activity without TLSO;  TLSO for comfort per Dr. Rahat Lutz. Pertinent Medical History:  has a past medical history of Atrial fibrillation ([facility]          Precautions:  Fall Risk, spinal precautions, TLSO for comfort (OK for OOB without brace), bed/chair alarm     Assessment of current deficits    [x] Functional mobility          [x]ADLs           [x] Strength                  []Cognition    [x] Functional transfers        [x] IADLs         [x] Safety Awareness   [x]Endurance    [] Fine Coordination                        [x] Balance      [] Vision/perception   []Sensation      []Gross Motor Coordination            [] ROM           [] Delirium                   [] Motor Control      OT PLAN OF CARE   OT POC based on physician orders, patient diagnosis and results of clinical assessment     Frequency/Duration 1-3 days/wk for 2 weeks PRN   Specific OT Treatment Interventions to include:   * Instruction/training on adapted ADL techniques and AE recommendations to increase functional independence within precautions       * Training on energy conservation strategies, correct breathing pattern and techniques to improve independence/tolerance for self-care routine  * Functional transfer/mobility training/DME recommendations for increased independence,
Perfect Serv sent to Ronal Richardson in regards to discharge
Progress Note  Date:2023       ETV/8902-A  Patient Carolyn Rizo     Date of Birth:     Age:82 y.o. Patient says she has abdominal pain from constipation is much improved. Subjective    Subjective:  Symptoms:  Stable. No shortness of breath or chest pain. Diet:  Adequate intake. Activity level: Impaired due to pain. Pain:  She complains of pain that is moderate. Review of Systems   Constitutional:  Negative for activity change and fever. HENT:  Negative for congestion. Respiratory:  Negative for shortness of breath. Cardiovascular:  Negative for chest pain. Gastrointestinal:  Positive for abdominal pain. Musculoskeletal:  Positive for back pain. Neurological:  Negative for dizziness. Objective         Vitals Last 24 Hours:  TEMPERATURE:  Temp  Av.7 °F (36.5 °C)  Min: 96.8 °F (36 °C)  Max: 98.8 °F (37.1 °C)  RESPIRATIONS RANGE: Resp  Avg: 15.7  Min: 14  Max: 17  PULSE OXIMETRY RANGE: SpO2  Av.7 %  Min: 90 %  Max: 93 %  PULSE RANGE: Pulse  Av  Min: 84  Max: 110  BLOOD PRESSURE RANGE: Systolic (98THI), CTZ:147 , Min:125 , OZQ:084   ; Diastolic (15EWB), KYO:04, Min:87, Max:98    I/O (24Hr): Intake/Output Summary (Last 24 hours) at 2023 8911  Last data filed at 2023 2244  Gross per 24 hour   Intake 100 ml   Output --   Net 100 ml       Objective:  General Appearance:  Comfortable. Vital signs: (most recent): Blood pressure (!) 125/90, pulse 84, temperature 97.5 °F (36.4 °C), temperature source Temporal, resp. rate 17, height 4' 10\" (1.473 m), weight 102 lb (46.3 kg), SpO2 92 %. No fever. Lungs:  Normal effort and normal respiratory rate. Breath sounds clear to auscultation. Heart: Normal rate. Regular rhythm. S1 normal and S2 normal.    Abdomen: Abdomen is soft and distended. There is generalized tenderness.      Labs/Imaging/Diagnostics    Labs:  CBC:  No results for input(s): WBC, RBC, HGB, HCT, MCV, RDW, PLT in
Progress Note  Date:2023       KWED:[id number]-M  Patient Steven Montgomery     Date of Birth:     Age:82 y.o. Patient says she has abdominal pain from constipation. Subjective    Subjective:  Symptoms:  Stable. No shortness of breath or chest pain. Diet:  Adequate intake. Activity level: Impaired due to pain. Pain:  She complains of pain that is moderate. Review of Systems   Constitutional:  Negative for activity change and fever. HENT:  Negative for congestion. Respiratory:  Negative for shortness of breath. Cardiovascular:  Negative for chest pain. Gastrointestinal:  Positive for abdominal pain. Musculoskeletal:  Positive for back pain. Neurological:  Negative for dizziness. Objective         Vitals Last 24 Hours:  TEMPERATURE:  Temp  Av.7 °F (36.5 °C)  Min: 96.8 °F (36 °C)  Max: 98.8 °F (37.1 °C)  RESPIRATIONS RANGE: Resp  Av  Min: 14  Max: 18  PULSE OXIMETRY RANGE: SpO2  Av.6 %  Min: 90 %  Max: 96 %  PULSE RANGE: Pulse  Av  Min: 84  Max: 110  BLOOD PRESSURE RANGE: Systolic (19AMX), ATW:817 , Min:126 , NRI:728   ; Diastolic (78MZZ), QHB:02, Min:86, Max:98    I/O (24Hr): Intake/Output Summary (Last 24 hours) at 2023 0911  Last data filed at 2023 2030  Gross per 24 hour   Intake 200 ml   Output --   Net 200 ml       Objective:  General Appearance:  Comfortable. Vital signs: (most recent): Blood pressure 127/87, pulse 94, temperature 96.8 °F (36 °C), resp. rate 14, height 4' 10\" (1.473 m), weight 102 lb (46.3 kg), SpO2 93 %. No fever. Lungs:  Normal effort and normal respiratory rate. Breath sounds clear to auscultation. Heart: Normal rate. Regular rhythm. S1 normal and S2 normal.    Abdomen: Abdomen is soft and distended. There is generalized tenderness.      Labs/Imaging/Diagnostics    Labs:  CBC:  Recent Labs     23  0612   WBC 5.3   RBC 4.46   HGB 13.4   HCT 41.8   MCV 93.7   RDW 13.2   
4. 76 4.37 4.46   HGB 14.0 12.9 13.4   HCT 45.0 40.3 41.8   MCV 94.5 92.2 93.7   RDW 13.3 13.3 13.2    173 167     CHEMISTRIES:  Recent Labs     05/19/23  1356 05/20/23  0437 05/21/23  0612    140 138   K 4.2 3.8 4.4    102 101   CO2 27 27 29   BUN 16 15 14   CREATININE 0.9 0.9 0.9   GLUCOSE 89 81 91     PT/INR:No results for input(s): PROTIME, INR in the last 72 hours. APTT:No results for input(s): APTT in the last 72 hours. LIVER PROFILE:  Recent Labs     05/19/23  1356 05/20/23 0437 05/21/23  0612   AST 25 23 21   ALT 7 7 6   BILITOT 0.6 0.4 0.4   ALKPHOS 88 76 79       Imaging Last 24 Hours:  MRI THORACIC SPINE WO CONTRAST    Result Date: 5/20/2023  EXAMINATION: MRI OF THE THORACIC SPINE WITHOUT CONTRAST,  5/20/2023 1:01 pm TECHNIQUE: Multiplanar multisequence MRI of the thoracic spine was performed without the administration of intravenous contrast. COMPARISON: 06/21/2022 HISTORY: ORDERING SYSTEM PROVIDED HISTORY:  Compression fracture T11. TECHNOLOGIST PROVIDED HISTORY: Reason for Exam:  Compression fracture T11. What is the sedation requirement? None What reading provider will be dictating this exam?  CRC FINDINGS: The examination is motion degraded. BONES/ALIGNMENT: There is an acute T11 superior endplate compression fracture with 20% height loss. There is no bone retropulsion or posterior element involvement. There are chronic compression deformities at every other thoracic level and at L1 and L2. There have been kyphoplasties at L1 and L2. There is increased kyphosis throughout the thoracic spine due to the compression deformities. Bone marrow signal is benign. SPINAL CORD: No abnormal cord signal is seen. SOFT TISSUES: Cholelithiasis without evidence of acute cholecystitis. DEGENERATIVE CHANGES: No significant spinal canal stenosis or neural foraminal narrowing of the thoracic spine. Acute T11 mild compression fracture.  Unchanged chronic compression deformities at every other

## 2023-05-24 NOTE — DISCHARGE INSTRUCTIONS
RECOMMEND USING TLSO BRACE WHEN AMBULATING TO SUPPORT AND STABILIZE SPINE FRACTURE FOLLOW WITH DR Tatum Cochran IN 2 WEEKS

## 2023-05-24 NOTE — CARE COORDINATION
Pt planning to return home at discharge with no anticipated needs. Her car is parked in the ED.  CM to follow for any discharge needs that arise (TF)    Mandy Tolliver, [facility]  Case Management  [phone]

## 2023-08-29 ENCOUNTER — APPOINTMENT (OUTPATIENT)
Dept: CT IMAGING | Age: 83
DRG: 292 | End: 2023-08-29
Payer: MEDICARE

## 2023-08-29 ENCOUNTER — HOSPITAL ENCOUNTER (INPATIENT)
Age: 83
LOS: 5 days | Discharge: HOME | DRG: 292 | End: 2023-09-04
Attending: EMERGENCY MEDICINE | Admitting: FAMILY MEDICINE
Payer: MEDICARE

## 2023-08-29 ENCOUNTER — APPOINTMENT (OUTPATIENT)
Dept: GENERAL RADIOLOGY | Age: 83
DRG: 292 | End: 2023-08-29
Payer: MEDICARE

## 2023-08-29 DIAGNOSIS — I50.43 CHF (CONGESTIVE HEART FAILURE), NYHA CLASS I, ACUTE ON CHRONIC, COMBINED (HCC): ICD-10-CM

## 2023-08-29 DIAGNOSIS — J43.2 CENTRILOBULAR EMPHYSEMA (HCC): ICD-10-CM

## 2023-08-29 DIAGNOSIS — I50.9 ACUTE ON CHRONIC HEART FAILURE, UNSPECIFIED HEART FAILURE TYPE (HCC): Primary | ICD-10-CM

## 2023-08-29 DIAGNOSIS — R77.8 ELEVATED TROPONIN: ICD-10-CM

## 2023-08-29 DIAGNOSIS — R06.09 DYSPNEA ON EXERTION: ICD-10-CM

## 2023-08-29 LAB
ALBUMIN SERPL-MCNC: 3.9 G/DL (ref 3.5–5.2)
ALP SERPL-CCNC: 86 U/L (ref 35–104)
ALT SERPL-CCNC: 9 U/L (ref 0–32)
ANION GAP SERPL CALCULATED.3IONS-SCNC: 7 MMOL/L (ref 7–16)
AST SERPL-CCNC: 29 U/L (ref 0–31)
BASOPHILS # BLD: 0.04 K/UL (ref 0–0.2)
BASOPHILS NFR BLD: 1 % (ref 0–2)
BILIRUB SERPL-MCNC: 0.8 MG/DL (ref 0–1.2)
BNP SERPL-MCNC: 4040 PG/ML (ref 0–450)
BUN SERPL-MCNC: 17 MG/DL (ref 6–23)
CALCIUM SERPL-MCNC: 9.4 MG/DL (ref 8.6–10.2)
CHLORIDE SERPL-SCNC: 100 MMOL/L (ref 98–107)
CO2 SERPL-SCNC: 30 MMOL/L (ref 22–29)
CREAT SERPL-MCNC: 0.9 MG/DL (ref 0.5–1)
EKG ATRIAL RATE: 227 BPM
EKG Q-T INTERVAL: 378 MS
EKG QRS DURATION: 70 MS
EKG QTC CALCULATION (BAZETT): 454 MS
EKG R AXIS: 132 DEGREES
EKG VENTRICULAR RATE: 87 BPM
EOSINOPHIL # BLD: 0.06 K/UL (ref 0.05–0.5)
EOSINOPHILS RELATIVE PERCENT: 1 % (ref 0–6)
ERYTHROCYTE [DISTWIDTH] IN BLOOD BY AUTOMATED COUNT: 14.5 % (ref 11.5–15)
GFR SERPL CREATININE-BSD FRML MDRD: >60 ML/MIN/1.73M2
GLUCOSE SERPL-MCNC: 94 MG/DL (ref 74–99)
HCT VFR BLD AUTO: 42.9 % (ref 34–48)
HGB BLD-MCNC: 13.7 G/DL (ref 11.5–15.5)
IMM GRANULOCYTES # BLD AUTO: <0.03 K/UL (ref 0–0.58)
IMM GRANULOCYTES NFR BLD: 0 % (ref 0–5)
LYMPHOCYTES NFR BLD: 0.91 K/UL (ref 1.5–4)
LYMPHOCYTES RELATIVE PERCENT: 19 % (ref 20–42)
MAGNESIUM SERPL-MCNC: 2 MG/DL (ref 1.6–2.6)
MCH RBC QN AUTO: 30 PG (ref 26–35)
MCHC RBC AUTO-ENTMCNC: 31.9 G/DL (ref 32–34.5)
MCV RBC AUTO: 94.1 FL (ref 80–99.9)
MONOCYTES NFR BLD: 0.61 K/UL (ref 0.1–0.95)
MONOCYTES NFR BLD: 13 % (ref 2–12)
NEUTROPHILS NFR BLD: 66 % (ref 43–80)
NEUTS SEG NFR BLD: 3.19 K/UL (ref 1.8–7.3)
PLATELET, FLUORESCENCE: 140 K/UL (ref 130–450)
PMV BLD AUTO: 10.7 FL (ref 7–12)
POTASSIUM SERPL-SCNC: 4.3 MMOL/L (ref 3.5–5)
PROT SERPL-MCNC: 7.1 G/DL (ref 6.4–8.3)
RBC # BLD AUTO: 4.56 M/UL (ref 3.5–5.5)
SODIUM SERPL-SCNC: 137 MMOL/L (ref 132–146)
TROPONIN I SERPL HS-MCNC: 44 NG/L (ref 0–9)
WBC OTHER # BLD: 4.8 K/UL (ref 4.5–11.5)

## 2023-08-29 PROCEDURE — 93010 ELECTROCARDIOGRAM REPORT: CPT | Performed by: INTERNAL MEDICINE

## 2023-08-29 PROCEDURE — 6360000002 HC RX W HCPCS: Performed by: NURSE PRACTITIONER

## 2023-08-29 PROCEDURE — 96375 TX/PRO/DX INJ NEW DRUG ADDON: CPT

## 2023-08-29 PROCEDURE — 99285 EMERGENCY DEPT VISIT HI MDM: CPT

## 2023-08-29 PROCEDURE — 6360000004 HC RX CONTRAST MEDICATION: Performed by: RADIOLOGY

## 2023-08-29 PROCEDURE — 80053 COMPREHEN METABOLIC PANEL: CPT

## 2023-08-29 PROCEDURE — 96376 TX/PRO/DX INJ SAME DRUG ADON: CPT

## 2023-08-29 PROCEDURE — 83735 ASSAY OF MAGNESIUM: CPT

## 2023-08-29 PROCEDURE — 93005 ELECTROCARDIOGRAM TRACING: CPT | Performed by: NURSE PRACTITIONER

## 2023-08-29 PROCEDURE — 85025 COMPLETE CBC W/AUTO DIFF WBC: CPT

## 2023-08-29 PROCEDURE — 96374 THER/PROPH/DIAG INJ IV PUSH: CPT

## 2023-08-29 PROCEDURE — 2580000003 HC RX 258: Performed by: NURSE PRACTITIONER

## 2023-08-29 PROCEDURE — 71046 X-RAY EXAM CHEST 2 VIEWS: CPT

## 2023-08-29 PROCEDURE — 71275 CT ANGIOGRAPHY CHEST: CPT

## 2023-08-29 PROCEDURE — 83880 ASSAY OF NATRIURETIC PEPTIDE: CPT

## 2023-08-29 PROCEDURE — 84484 ASSAY OF TROPONIN QUANT: CPT

## 2023-08-29 RX ORDER — DIPHENHYDRAMINE HYDROCHLORIDE 50 MG/ML
25 INJECTION INTRAMUSCULAR; INTRAVENOUS ONCE
Status: COMPLETED | OUTPATIENT
Start: 2023-08-29 | End: 2023-08-29

## 2023-08-29 RX ADMIN — DIPHENHYDRAMINE HYDROCHLORIDE 25 MG: 50 INJECTION INTRAMUSCULAR; INTRAVENOUS at 21:37

## 2023-08-29 RX ADMIN — IOPAMIDOL 70 ML: 755 INJECTION, SOLUTION INTRAVENOUS at 21:53

## 2023-08-29 RX ADMIN — DIPHENHYDRAMINE HYDROCHLORIDE 25 MG: 50 INJECTION INTRAMUSCULAR; INTRAVENOUS at 17:42

## 2023-08-29 RX ADMIN — METHYLPREDNISOLONE SODIUM SUCCINATE 125 MG: 125 INJECTION, POWDER, LYOPHILIZED, FOR SOLUTION INTRAMUSCULAR; INTRAVENOUS at 17:42

## 2023-08-30 PROBLEM — I50.9 ACUTE ON CHRONIC HEART FAILURE, UNSPECIFIED HEART FAILURE TYPE (HCC): Status: ACTIVE | Noted: 2023-08-30

## 2023-08-30 LAB
BACTERIA URNS QL MICRO: ABNORMAL
BILIRUB UR QL STRIP: NEGATIVE
CLARITY UR: CLEAR
COLOR UR: YELLOW
GLUCOSE UR STRIP-MCNC: NEGATIVE MG/DL
HGB UR QL STRIP.AUTO: NEGATIVE
KETONES UR STRIP-MCNC: NEGATIVE MG/DL
LEUKOCYTE ESTERASE UR QL STRIP: NEGATIVE
NITRITE UR QL STRIP: NEGATIVE
PH UR STRIP: 5.5 [PH] (ref 5–9)
PROT UR STRIP-MCNC: NEGATIVE MG/DL
RBC #/AREA URNS HPF: ABNORMAL /HPF
SP GR UR STRIP: 1.02 (ref 1–1.03)
TROPONIN I SERPL HS-MCNC: 33 NG/L (ref 0–9)
UROBILINOGEN UR STRIP-ACNC: 1 EU/DL (ref 0–1)
WBC #/AREA URNS HPF: ABNORMAL /HPF

## 2023-08-30 PROCEDURE — 84484 ASSAY OF TROPONIN QUANT: CPT

## 2023-08-30 PROCEDURE — 81001 URINALYSIS AUTO W/SCOPE: CPT

## 2023-08-30 PROCEDURE — 6360000002 HC RX W HCPCS: Performed by: FAMILY MEDICINE

## 2023-08-30 PROCEDURE — 6370000000 HC RX 637 (ALT 250 FOR IP): Performed by: FAMILY MEDICINE

## 2023-08-30 PROCEDURE — 94640 AIRWAY INHALATION TREATMENT: CPT

## 2023-08-30 PROCEDURE — 94664 DEMO&/EVAL PT USE INHALER: CPT

## 2023-08-30 PROCEDURE — 2500000003 HC RX 250 WO HCPCS: Performed by: NURSE PRACTITIONER

## 2023-08-30 PROCEDURE — 2060000000 HC ICU INTERMEDIATE R&B

## 2023-08-30 RX ORDER — DILTIAZEM HYDROCHLORIDE 120 MG/1
120 CAPSULE, COATED, EXTENDED RELEASE ORAL DAILY
Status: DISCONTINUED | OUTPATIENT
Start: 2023-08-30 | End: 2023-09-02

## 2023-08-30 RX ORDER — BISACODYL 5 MG/1
5 TABLET, DELAYED RELEASE ORAL DAILY PRN
Status: DISCONTINUED | OUTPATIENT
Start: 2023-08-30 | End: 2023-09-04 | Stop reason: HOSPADM

## 2023-08-30 RX ORDER — LISINOPRIL 10 MG/1
10 TABLET ORAL DAILY
Status: DISCONTINUED | OUTPATIENT
Start: 2023-08-30 | End: 2023-09-04 | Stop reason: HOSPADM

## 2023-08-30 RX ORDER — ALENDRONATE SODIUM 70 MG/1
70 TABLET ORAL
Status: DISCONTINUED | OUTPATIENT
Start: 2023-08-30 | End: 2023-08-30 | Stop reason: CLARIF

## 2023-08-30 RX ORDER — FERROUS SULFATE 325(65) MG
325 TABLET ORAL
Status: DISCONTINUED | OUTPATIENT
Start: 2023-08-31 | End: 2023-09-04 | Stop reason: HOSPADM

## 2023-08-30 RX ORDER — SIMETHICONE 80 MG
80 TABLET,CHEWABLE ORAL EVERY 6 HOURS PRN
Status: DISCONTINUED | OUTPATIENT
Start: 2023-08-30 | End: 2023-09-04 | Stop reason: HOSPADM

## 2023-08-30 RX ORDER — NADOLOL 20 MG/1
20 TABLET ORAL DAILY
Status: ON HOLD | COMMUNITY
End: 2023-09-04 | Stop reason: HOSPADM

## 2023-08-30 RX ORDER — FUROSEMIDE 10 MG/ML
40 INJECTION INTRAMUSCULAR; INTRAVENOUS DAILY
Status: DISCONTINUED | OUTPATIENT
Start: 2023-08-30 | End: 2023-09-03

## 2023-08-30 RX ORDER — BUMETANIDE 0.25 MG/ML
0.5 INJECTION INTRAMUSCULAR; INTRAVENOUS ONCE
Status: COMPLETED | OUTPATIENT
Start: 2023-08-30 | End: 2023-08-30

## 2023-08-30 RX ORDER — ALBUTEROL SULFATE 2.5 MG/3ML
2.5 SOLUTION RESPIRATORY (INHALATION) EVERY 6 HOURS PRN
Status: DISCONTINUED | OUTPATIENT
Start: 2023-08-30 | End: 2023-09-04 | Stop reason: HOSPADM

## 2023-08-30 RX ORDER — ARFORMOTEROL TARTRATE 15 UG/2ML
15 SOLUTION RESPIRATORY (INHALATION)
Status: DISCONTINUED | OUTPATIENT
Start: 2023-08-30 | End: 2023-09-01

## 2023-08-30 RX ORDER — ALBUTEROL SULFATE 90 UG/1
2 AEROSOL, METERED RESPIRATORY (INHALATION) EVERY 6 HOURS PRN
Status: DISCONTINUED | OUTPATIENT
Start: 2023-08-30 | End: 2023-08-30 | Stop reason: CLARIF

## 2023-08-30 RX ORDER — POTASSIUM CHLORIDE 20 MEQ/1
20 TABLET, EXTENDED RELEASE ORAL DAILY
Status: DISCONTINUED | OUTPATIENT
Start: 2023-08-30 | End: 2023-08-31

## 2023-08-30 RX ORDER — ROSUVASTATIN CALCIUM 10 MG/1
10 TABLET, COATED ORAL DAILY
Status: DISCONTINUED | OUTPATIENT
Start: 2023-08-30 | End: 2023-09-04 | Stop reason: HOSPADM

## 2023-08-30 RX ADMIN — BUMETANIDE 0.5 MG: 0.25 INJECTION INTRAMUSCULAR; INTRAVENOUS at 02:27

## 2023-08-30 RX ADMIN — ARFORMOTEROL TARTRATE 15 MCG: 15 SOLUTION RESPIRATORY (INHALATION) at 11:39

## 2023-08-30 RX ADMIN — FUROSEMIDE 40 MG: 10 INJECTION, SOLUTION INTRAMUSCULAR; INTRAVENOUS at 12:46

## 2023-08-30 RX ADMIN — APIXABAN 2.5 MG: 2.5 TABLET, FILM COATED ORAL at 12:40

## 2023-08-30 RX ADMIN — IPRATROPIUM BROMIDE 0.5 MG: 0.5 SOLUTION RESPIRATORY (INHALATION) at 16:37

## 2023-08-30 RX ADMIN — POTASSIUM CHLORIDE 20 MEQ: 1500 TABLET, EXTENDED RELEASE ORAL at 12:50

## 2023-08-30 RX ADMIN — APIXABAN 2.5 MG: 2.5 TABLET, FILM COATED ORAL at 20:23

## 2023-08-30 RX ADMIN — DILTIAZEM HYDROCHLORIDE 120 MG: 120 CAPSULE, COATED, EXTENDED RELEASE ORAL at 12:40

## 2023-08-30 RX ADMIN — IPRATROPIUM BROMIDE 0.5 MG: 0.5 SOLUTION RESPIRATORY (INHALATION) at 11:39

## 2023-08-30 RX ADMIN — ROSUVASTATIN CALCIUM 10 MG: 10 TABLET, FILM COATED ORAL at 12:40

## 2023-08-30 RX ADMIN — LISINOPRIL 10 MG: 10 TABLET ORAL at 12:50

## 2023-08-30 ASSESSMENT — ENCOUNTER SYMPTOMS
ABDOMINAL PAIN: 0
SHORTNESS OF BREATH: 1

## 2023-08-30 NOTE — ACP (ADVANCE CARE PLANNING)
Advance Care Planning   Healthcare Decision Maker:    Primary Decision Maker: Reji Bassett - [phone]    Click here to complete Healthcare Decision Makers including selection of the Healthcare Decision Maker Relationship (ie \"Primary\"). Today we documented desired Decision Maker(s), who is (are) NOT Legal Next of Kin. ACP documents are required for decision maker authority. If the relationship to the patient does NOT follow our state's Next of Kin hierarchy, the patient MUST complete an ACP Document to allow him/her to act on the patient's behalf. :     Pt is currently working on [employer] paper work with an .

## 2023-08-30 NOTE — PROGRESS NOTES
Pharmacy Note    This patient was ordered alendronate (Fosamax). Per the Araseli, this medication is non-formulary and not stocked by pharmacy for the reason indicated below. The medication can be reordered at discharge.      Medications in which risks outweigh benefits during hospitalization:           -  oral bisphosphonates         -  raloxifene (Evista)        -  SGLT2 inhibitors (ordered in the hospital for an indication other than heart failure or chronic kidney disease)    Medications that lack necessity during an acute hospital stay:        -  nasal antihistamines        -  nasal ipratropium 0.03% and 0.06%        -  nasal miacalcin        -  acyclovir topical cream/ointment orders for herpes labialis (cold sores)

## 2023-08-30 NOTE — PROGRESS NOTES
4 Eyes Skin Assessment     NAME:  Amrita Dailey  YOB: 1940  MEDICAL RECORD NUMBER:  [id number]    The patient is being assessed for  Admission    I agree that at least one RN has performed a thorough Head to Toe Skin Assessment on the patient. ALL assessment sites listed below have been assessed. Areas assessed by both nurses:    Head, Face, Ears, Shoulders, Back, Chest, Arms, Elbows, Hands, Sacrum. Buttock, Coccyx, Ischium, Legs. Feet and Heels, and Under Medical Devices         Does the Patient have a Wound?  No noted wound(s)       Elroy Prevention initiated by RN: No  Wound Care Orders initiated by RN: No    Pressure Injury (Stage 3,4, Unstageable, DTI, NWPT, and Complex wounds) if present, place Wound referral order by RN under : No    New Ostomies, if present place, Ostomy referral order under : No     Nurse 1 eSignature: Electronically signed by Kurtis Parekh RN on 8/30/23 at 6:49 PM EDT    **SHARE this note so that the co-signing nurse can place an eSignature**    Nurse 2 eSignature: {Esignature:[id number]}

## 2023-08-30 NOTE — PROGRESS NOTES
Patient admitted to [facility] with following belongings: cell phone, , pants, t-shirt, shoes, purse with documents, wallet, keys, hygiene, uppers dentures, 7 bottles of medications ( RN placed them in a bag at the med room with pt identification), eye glasses and one ring.

## 2023-08-30 NOTE — H&P
HISTORY AND PHYSICAL             Date: 8/30/2023        Patient Name: Xi Tobias     YOB: 1940      Age:  80 y.o. Chief Complaint     Chief Complaint   Patient presents with    Shortness of Breath     Starting a few nights ago, only at night, denies cough,  recent blood clot in L leg, denies blood thinners         History Obtained From   patient    History of Present Illness   Patient presented to the ER because she says she became more short of breath at night. Past Medical History     Past Medical History:   Diagnosis Date    Atrial fibrillation [facility])         Past Surgical History     Past Surgical History:   Procedure Laterality Date    UPPER GASTROINTESTINAL ENDOSCOPY  07/25/2017        Medications Prior to Admission     Prior to Admission medications    Medication Sig Start Date End Date Taking? Authorizing Provider   bisacodyl (DULCOLAX) 5 MG EC tablet Take 1 tablet by mouth daily as needed for Constipation 5/24/23   Aidee Rajan MD   [facility]) 80 MG chewable tablet Take 1 tablet by mouth every 6 hours as needed for Flatulence 5/24/23   Aidee Rajan MD   rosuvastatin (CRESTOR) 10 MG tablet Take 10 mg by mouth daily    Historical Provider, MD   mupirocin (BACTROBAN) 2 % ointment Apply topically 3 times daily.  3/31/22   HAILEY Priest   lisinopril (PRINIVIL;ZESTRIL) 10 MG tablet Take 1 tablet by mouth daily 6/25/21   Triny Saucedo DO   dilTIAZem (CARDIZEM CD) 120 MG extended release capsule Take 1 capsule by mouth daily 6/25/21   Triny Saucedo DO   furosemide (LASIX) 20 MG tablet Take 1 tablet by mouth three times a week Mon-Weds-Sat only 6/25/21   Triny Saucedo DO   Tiotropium Bromide-Olodaterol (STIOLTO RESPIMAT) 2.5-2.5 MCG/ACT AERS Inhale 2 actuation into the lungs daily 3/15/21   Cher Silveira MD   albuterol sulfate HFA (PROVENTIL HFA) 108 (90 Base) MCG/ACT inhaler Inhale 2 puffs into the lungs every 6 hours as needed for Wheezing

## 2023-08-30 NOTE — CARE COORDINATION
RADHA transition of care. Pt presented to [address] [place] [address] ER secondary to shortness of breath. Admitted inpatient with CHF. Cardiology on consult. Lasix IV daily ordered. Met with pt. She lives alone in a single story home. 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 She is an active . She owns 2 canes and a walker. No hx of HHC or LUIS MANUEL. PCP is FIDEL Ascencio and uses [facility] [employer]. Pt is currently on oxygen at 4 L. Baseline is none. If needed at d/c she does not have a preference for DME. Pt plans to return to home when medically stable. Pt drove herself to the hospital and plans to  herself home at d/c.  CM/NIKHIL to follow. Tea Gonzalez [facility] [phone].

## 2023-08-31 LAB
ANION GAP SERPL CALCULATED.3IONS-SCNC: 10 MMOL/L (ref 7–16)
BASOPHILS # BLD: 0.02 K/UL (ref 0–0.2)
BASOPHILS NFR BLD: 0 % (ref 0–2)
BUN SERPL-MCNC: 26 MG/DL (ref 6–23)
CALCIUM SERPL-MCNC: 8.4 MG/DL (ref 8.6–10.2)
CHLORIDE SERPL-SCNC: 101 MMOL/L (ref 98–107)
CO2 SERPL-SCNC: 29 MMOL/L (ref 22–29)
CREAT SERPL-MCNC: 1.1 MG/DL (ref 0.5–1)
EOSINOPHIL # BLD: 0 K/UL (ref 0.05–0.5)
EOSINOPHILS RELATIVE PERCENT: 0 % (ref 0–6)
ERYTHROCYTE [DISTWIDTH] IN BLOOD BY AUTOMATED COUNT: 14.4 % (ref 11.5–15)
GFR SERPL CREATININE-BSD FRML MDRD: 52 ML/MIN/1.73M2
GLUCOSE SERPL-MCNC: 102 MG/DL (ref 74–99)
HCT VFR BLD AUTO: 37.5 % (ref 34–48)
HGB BLD-MCNC: 11.6 G/DL (ref 11.5–15.5)
IMM GRANULOCYTES # BLD AUTO: 0.03 K/UL (ref 0–0.58)
IMM GRANULOCYTES NFR BLD: 0 % (ref 0–5)
LYMPHOCYTES NFR BLD: 1.18 K/UL (ref 1.5–4)
LYMPHOCYTES RELATIVE PERCENT: 13 % (ref 20–42)
MCH RBC QN AUTO: 29.4 PG (ref 26–35)
MCHC RBC AUTO-ENTMCNC: 30.9 G/DL (ref 32–34.5)
MCV RBC AUTO: 95.2 FL (ref 80–99.9)
MONOCYTES NFR BLD: 0.81 K/UL (ref 0.1–0.95)
MONOCYTES NFR BLD: 9 % (ref 2–12)
NEUTROPHILS NFR BLD: 77 % (ref 43–80)
NEUTS SEG NFR BLD: 6.97 K/UL (ref 1.8–7.3)
PLATELET, FLUORESCENCE: 156 K/UL (ref 130–450)
PMV BLD AUTO: 11.1 FL (ref 7–12)
POTASSIUM SERPL-SCNC: 4.7 MMOL/L (ref 3.5–5)
RBC # BLD AUTO: 3.94 M/UL (ref 3.5–5.5)
SODIUM SERPL-SCNC: 140 MMOL/L (ref 132–146)
WBC OTHER # BLD: 9 K/UL (ref 4.5–11.5)

## 2023-08-31 PROCEDURE — 2700000000 HC OXYGEN THERAPY PER DAY

## 2023-08-31 PROCEDURE — 6370000000 HC RX 637 (ALT 250 FOR IP): Performed by: FAMILY MEDICINE

## 2023-08-31 PROCEDURE — 80048 BASIC METABOLIC PNL TOTAL CA: CPT

## 2023-08-31 PROCEDURE — 2060000000 HC ICU INTERMEDIATE R&B

## 2023-08-31 PROCEDURE — 36415 COLL VENOUS BLD VENIPUNCTURE: CPT

## 2023-08-31 PROCEDURE — 85025 COMPLETE CBC W/AUTO DIFF WBC: CPT

## 2023-08-31 PROCEDURE — 6360000002 HC RX W HCPCS: Performed by: FAMILY MEDICINE

## 2023-08-31 PROCEDURE — 94640 AIRWAY INHALATION TREATMENT: CPT

## 2023-08-31 PROCEDURE — 6370000000 HC RX 637 (ALT 250 FOR IP): Performed by: INTERNAL MEDICINE

## 2023-08-31 RX ORDER — SPIRONOLACTONE 25 MG/1
12.5 TABLET ORAL DAILY
Status: DISCONTINUED | OUTPATIENT
Start: 2023-08-31 | End: 2023-09-04 | Stop reason: HOSPADM

## 2023-08-31 RX ADMIN — IPRATROPIUM BROMIDE 0.5 MG: 0.5 SOLUTION RESPIRATORY (INHALATION) at 19:49

## 2023-08-31 RX ADMIN — APIXABAN 2.5 MG: 2.5 TABLET, FILM COATED ORAL at 20:29

## 2023-08-31 RX ADMIN — APIXABAN 2.5 MG: 2.5 TABLET, FILM COATED ORAL at 09:35

## 2023-08-31 RX ADMIN — IPRATROPIUM BROMIDE 0.5 MG: 0.5 SOLUTION RESPIRATORY (INHALATION) at 11:16

## 2023-08-31 RX ADMIN — SPIRONOLACTONE 12.5 MG: 25 TABLET ORAL at 14:08

## 2023-08-31 RX ADMIN — ROSUVASTATIN CALCIUM 10 MG: 10 TABLET, FILM COATED ORAL at 09:35

## 2023-08-31 RX ADMIN — FERROUS SULFATE TAB 325 MG (65 MG ELEMENTAL FE) 325 MG: 325 (65 FE) TAB at 09:35

## 2023-08-31 RX ADMIN — POTASSIUM CHLORIDE 20 MEQ: 1500 TABLET, EXTENDED RELEASE ORAL at 09:35

## 2023-08-31 RX ADMIN — LISINOPRIL 10 MG: 10 TABLET ORAL at 09:35

## 2023-08-31 RX ADMIN — ARFORMOTEROL TARTRATE 15 MCG: 15 SOLUTION RESPIRATORY (INHALATION) at 19:49

## 2023-08-31 RX ADMIN — FUROSEMIDE 40 MG: 10 INJECTION, SOLUTION INTRAMUSCULAR; INTRAVENOUS at 09:35

## 2023-08-31 RX ADMIN — IPRATROPIUM BROMIDE 0.5 MG: 0.5 SOLUTION RESPIRATORY (INHALATION) at 15:49

## 2023-08-31 RX ADMIN — DILTIAZEM HYDROCHLORIDE 120 MG: 120 CAPSULE, COATED, EXTENDED RELEASE ORAL at 09:35

## 2023-08-31 ASSESSMENT — ENCOUNTER SYMPTOMS
SHORTNESS OF BREATH: 0
ABDOMINAL PAIN: 0

## 2023-08-31 NOTE — DISCHARGE INSTRUCTIONS
HEART FAILURE  / CONGESTIVE HEART FAILURE  DISCHARGE INSTRUCTIONS:  GUIDELINES TO FOLLOW AT HOME    Self- Managed Care:     MEDICATIONS:  Take your medication as directed. If you are experiencing any side effects, inform your doctor, Do not stop taking any of your medications without letting your doctor know. Check with your doctor before taking any over-the-counter medications / herbal / or dietary supplements. They may interfere with your other medications. Do not take ibuprofen (Advil or Motrin) and naproxen (Aleve) without talking to your doctor first. They could make your heart failure worse. WEIGHT MONITORING:   Weigh yourself everyday (with the same scale) around the same time of the day and write it down. (you can chart them on a calendar or keep track of them on paper. Notify your doctor of a weight gain of 3 pounds or more in 1 day   OR a total of 5 pounds or more in 1 week    Take your weight record to your doctor visits  Also, the same goes if you loose more than 3# in one day, let your heart doctor know. DIET:   Cardiac heart healthy diet- Low saturated / low trans fat, no added salt, caffeine restricted, Low sodium diet-   No more than 2,000mg (2 grams) of salt / sodium per day (which equals to a little less than  a teaspoon of salt)  If your doctor wants you on a fluid restriction. ..it is usually recommended a fluid limit of 2,000cc -  Fluid restriction- 2,000 ml (milliliters) = 64 ounces = you can have 8 glasses of fluid per day (each glass 8 ounces)    Follow a low salt diet - avoid using salt at the table, avoid / limit use of canned soups, processed / packaged foods, salted snacks, olives and pickles. Do not use a salt substitute without checking with your doctor, they may contain a high amount of potassioum. (Mrs. Casandra Cooley is safe to use).     Limit the use of alcohol       CALL YOUR DOCTOR THE FIRST DAY YOU NOTICE ANY OF THESE   SYMPTOMS:  You have a doctor whether you need another dose. My Goal for Self-management of Heart Failure Includes 5 steps :    1. Notice a change in symptoms ( weight gain, short of breath, leg swelling, decreased activity level, bloating. ...)    2. Evaluate the change: (use the Heart Failure Zones )     3. Decide to take action: decide what your options are, such as: (call your doctor for an extra visit, take a prescribed medication, such as your water pill if your doctor has given you directions to do so, [facility])    4. Come up with a strategy:  (now you call the doctor for advice / appointment. This is where you take action!!! Do not wait, catch the symptom early and treat it before it worsens. 5. Evaluate the response: The next day, check your Heart Failure Zones: are you in the GREEN ZONE (safe zone)? Worsening symptoms of YELLOW ZONE? Or have you moved to the RED ZONE and need to call 911 or go to the Emergency Room for evaluation? Call your doctor's office to update them on your symptoms of heart failure.

## 2023-08-31 NOTE — PLAN OF CARE
Patient's chart updated to reflect:      . - HF care plan, HF education points and HF discharge instructions.  -Orders: 2 gram sodium diet, daily weights, I/O.  -PCP and cardiology follow up appointments to be scheduled within 7 days of hospital discharge. -CHF education session will be provided to the patient prior to hospital discharge.     Yuval Waters RN  Heart Failure Navigator

## 2023-08-31 NOTE — PROGRESS NOTES
Notified Dr Luz Purvis pt bp was 84/59 hr 80 for 0000 vitals on 3lnc 96% pt asymptomatic dr aware no new orders will continue to monitor.

## 2023-08-31 NOTE — PROGRESS NOTES
Physician Progress Note      PATIENT:               Amrita Dailey  CSN #:                  [id number]  :                       1940  ADMIT DATE:       2023 4:18 PM  [facility] DATE:  Cody Bruce  PROVIDER #:        Shayna Silvestre MD          QUERY TEXT:    Patient was admitted with SOB, noted to have \"atrial fibrillation in H&P on    and is maintained on Eliquis\". If possible, please document in progress   notes and discharge summary if you are evaluating and/or treating any of the   following: The medical record reflects the following:  Risk Factors: Age 80 Female, CHF. Clinical Indicators: \"atrial fibrillation in H&P on  and is maintained on   Eliquis\". Treatment: Eliquis. Thank you,  Patricia Black RN, BSN, CRCR, CCDS, SMART  Clinical Documentation Improvement  Tammy [email or web address]. [email or web address]  [phone] or via Perfect Serve  Options provided:  -- Secondary hypercoagulable state in a patient with atrial fibrillation  -- Other - I will add my own diagnosis  -- Disagree - Not applicable / Not valid  -- Disagree - Clinically unable to determine / Unknown  -- Refer to Clinical Documentation Reviewer    PROVIDER RESPONSE TEXT:    This patient has secondary hypercoagulable state in a patient with atrial   fibrillation. Query created by: Patricia Black on 2023 10:44 AM      QUERY TEXT:    Patient was admitted with SOB, noted to have \"atrial fibrillation in H&P on    and is maintained on Eliquis\". If possible, please document in progress   notes and discharge summary further specificity regarding the type of atrial   fibrillation: The medical record reflects the following:  Risk Factors: Age 80 Female, CHF. Clinical Indicators:    EKG-  Atrial fibrillation  Right axis deviation  Low voltage QRS  Delayed precordial transition  Abnormal ECG    \"atrial fibrillation in H&P on  and is maintained on Eliquis\".     Treatment: Eliquis, Cardizem    Chronic: nonspecific term that

## 2023-08-31 NOTE — CONSULTS
[address] [facility]   Inpatient CHF Nurse Navigator Consult          Terrie Stallworth is a 80 y.o. (1940) female with a history of HFpEF, most recent EF: 55%  4/5/23  Lab Results   Component Value Date    LVEF 79 04/05/2023       Patient was awake and alert, laying in bed during the consultation and is agreeable to heart failure education. She was engaged and asked appropriate questions throughout the education session. Barriers identified during consult contributing to HF Hospitalization:  [] Limited medication adherence   [] Poor health literacy, education regarding HF medications provided   [] Pill box provided to patient  [] Difficulty affording medications  [] Prescription assistance information given     [] Not weighing themselves daily  [x] Weight log provided for easy monitoring  [] Scale provided     [] Not following low sodium diet  [] Food insecurity   [x] 2 gram sodium diet education provided   [] Low sodium recipes provided  [] Sodium free seasoning provided   [] Low sodium meal delivery options given to patient  [] Dietician consulted     [] Lack of transportation to appointments     [] Depression, given chronic illness  [] Primary team notified     [] Goals of care need addressed  [] Palliative care consulted     [] CHF CHW consulted, to assist with         Chart Reviewed:  Diet: ADULT DIET; Regular;  Low Sodium (2 gm)   Daily Weights: Patient Vitals for the past 96 hrs (Last 3 readings):   Weight   08/30/23 1830 108 lb (49 kg)     I/O:   Intake/Output Summary (Last 24 hours) at 8/31/2023 1818  Last data filed at 8/31/2023 1445  Gross per 24 hour   Intake 600 ml   Output --   Net 600 ml       [] Nursing staff/manager notified of inaccurate moody weights or I/O        Discharge Plan:  Above identified barriers reviewed and needs addressed    Patient/family educated on daily monitoring tools for CHF, made aware of signs and symptoms of worsening HF and to notify

## 2023-08-31 NOTE — CONSULTS
CARDIOLOGY CONSULTATION    Patient Name:  Aly Nunes    :  1940    Reason for Consultation:   Recurrent profound dyspnea and atrial fibrillation rapid ventricular response    History of Present Illness:   Aly Nunes returns to [address] [facility] following history of gradual increase in shortness of breath over the past few days which seems more so in the evening. She has a longstanding history of atrial fibrillation for which she was on nadolol as well as apixaban. She was given intravenous diuretic as her proBNP was elevated at >1000 pg/mL. She admits to a small amount of peripheral edema but no abdominal fullness. She is now admitted for further observation, diagnostic testing and adjustment of her medical regimen. . She has no previous history of myocardial infarction. She denies any chest discomfort nor shortness of breath this afternoon. Past Medical History:   has a past medical history of Atrial fibrillation ([address]). Surgical History:   has a past surgical history that includes Upper gastrointestinal endoscopy (2017). Social History:   reports that she quit smoking about 40 years ago. Her smoking use included cigarettes. She started smoking about 61 years ago. She has a 30.00 pack-year smoking history. She has never used smokeless tobacco. She reports current alcohol use. She reports that she does not use drugs. Family History:  family history is remarkable for father  in his 80s secondary to heart disease and had previous heart surgery and mother  at 80 secondary to complications of pneumonia. Medications:  Prior to Admission medications    Medication Sig Start Date End Date Taking?  Authorizing Provider   nadolol (CORGARD) 20 MG tablet Take 1 tablet by mouth daily   Yes Historical Provider, MD   bisacodyl (DULCOLAX) 5 MG EC tablet Take 1 tablet by mouth daily as needed for Constipation 23   MD tim Montez

## 2023-08-31 NOTE — PLAN OF CARE
Problem: Discharge Planning  Goal: Discharge to home or other facility with appropriate resources  Outcome: Progressing  Flowsheets (Taken 8/30/2023 1849 by Jignesh Sahu RN)  Discharge to home or other facility with appropriate resources:   Identify barriers to discharge with patient and caregiver   Arrange for needed discharge resources and transportation as appropriate     Problem: Safety - Adult  Goal: Free from fall injury  Outcome: Progressing

## 2023-08-31 NOTE — PLAN OF CARE
Problem: Discharge Planning  Goal: Discharge to home or other facility with appropriate resources  Outcome: Progressing     Problem: Safety - Adult  Goal: Free from fall injury  Outcome: Progressing     Problem: ABCDS Injury Assessment  Goal: Absence of physical injury  Outcome: Progressing     Problem: Chronic Conditions and Co-morbidities  Goal: Patient's chronic conditions and co-morbidity symptoms are monitored and maintained or improved  8/31/2023 1934 by Adolph Abraham RN  Outcome: Progressing  8/31/2023 1814 by Amara Rojas RN  Outcome: Progressing

## 2023-09-01 LAB
ANION GAP SERPL CALCULATED.3IONS-SCNC: 10 MMOL/L (ref 7–16)
BASOPHILS # BLD: 0.03 K/UL (ref 0–0.2)
BASOPHILS NFR BLD: 1 % (ref 0–2)
BUN SERPL-MCNC: 22 MG/DL (ref 6–23)
CALCIUM SERPL-MCNC: 8.8 MG/DL (ref 8.6–10.2)
CHLORIDE SERPL-SCNC: 99 MMOL/L (ref 98–107)
CO2 SERPL-SCNC: 31 MMOL/L (ref 22–29)
CREAT SERPL-MCNC: 0.9 MG/DL (ref 0.5–1)
EOSINOPHIL # BLD: 0.07 K/UL (ref 0.05–0.5)
EOSINOPHILS RELATIVE PERCENT: 1 % (ref 0–6)
ERYTHROCYTE [DISTWIDTH] IN BLOOD BY AUTOMATED COUNT: 14.6 % (ref 11.5–15)
GFR SERPL CREATININE-BSD FRML MDRD: >60 ML/MIN/1.73M2
GLUCOSE SERPL-MCNC: 75 MG/DL (ref 74–99)
HCT VFR BLD AUTO: 42 % (ref 34–48)
HGB BLD-MCNC: 13 G/DL (ref 11.5–15.5)
IMM GRANULOCYTES # BLD AUTO: <0.03 K/UL (ref 0–0.58)
IMM GRANULOCYTES NFR BLD: 0 % (ref 0–5)
LV EF: 45 %
LVEF MODALITY: NORMAL
LYMPHOCYTES NFR BLD: 1.22 K/UL (ref 1.5–4)
LYMPHOCYTES RELATIVE PERCENT: 22 % (ref 20–42)
MCH RBC QN AUTO: 30 PG (ref 26–35)
MCHC RBC AUTO-ENTMCNC: 31 G/DL (ref 32–34.5)
MCV RBC AUTO: 96.8 FL (ref 80–99.9)
MONOCYTES NFR BLD: 0.67 K/UL (ref 0.1–0.95)
MONOCYTES NFR BLD: 12 % (ref 2–12)
NEUTROPHILS NFR BLD: 63 % (ref 43–80)
NEUTS SEG NFR BLD: 3.45 K/UL (ref 1.8–7.3)
PLATELET # BLD AUTO: 153 K/UL (ref 130–450)
PMV BLD AUTO: 10.8 FL (ref 7–12)
POTASSIUM SERPL-SCNC: 4.5 MMOL/L (ref 3.5–5)
RBC # BLD AUTO: 4.34 M/UL (ref 3.5–5.5)
SODIUM SERPL-SCNC: 140 MMOL/L (ref 132–146)
WBC OTHER # BLD: 5.5 K/UL (ref 4.5–11.5)

## 2023-09-01 PROCEDURE — 2060000000 HC ICU INTERMEDIATE R&B

## 2023-09-01 PROCEDURE — 6370000000 HC RX 637 (ALT 250 FOR IP): Performed by: INTERNAL MEDICINE

## 2023-09-01 PROCEDURE — 6360000002 HC RX W HCPCS: Performed by: FAMILY MEDICINE

## 2023-09-01 PROCEDURE — 85025 COMPLETE CBC W/AUTO DIFF WBC: CPT

## 2023-09-01 PROCEDURE — 80048 BASIC METABOLIC PNL TOTAL CA: CPT

## 2023-09-01 PROCEDURE — 93306 TTE W/DOPPLER COMPLETE: CPT

## 2023-09-01 PROCEDURE — 36415 COLL VENOUS BLD VENIPUNCTURE: CPT

## 2023-09-01 PROCEDURE — 2700000000 HC OXYGEN THERAPY PER DAY

## 2023-09-01 PROCEDURE — 94640 AIRWAY INHALATION TREATMENT: CPT

## 2023-09-01 PROCEDURE — 6370000000 HC RX 637 (ALT 250 FOR IP): Performed by: FAMILY MEDICINE

## 2023-09-01 RX ORDER — ARFORMOTEROL TARTRATE 15 UG/2ML
15 SOLUTION RESPIRATORY (INHALATION) 2 TIMES DAILY PRN
Status: DISCONTINUED | OUTPATIENT
Start: 2023-09-01 | End: 2023-09-04 | Stop reason: HOSPADM

## 2023-09-01 RX ORDER — ARFORMOTEROL TARTRATE 15 UG/2ML
15 SOLUTION RESPIRATORY (INHALATION)
Status: DISCONTINUED | OUTPATIENT
Start: 2023-09-01 | End: 2023-09-01

## 2023-09-01 RX ADMIN — ARFORMOTEROL TARTRATE 15 MCG: 15 SOLUTION RESPIRATORY (INHALATION) at 08:47

## 2023-09-01 RX ADMIN — LISINOPRIL 10 MG: 10 TABLET ORAL at 10:01

## 2023-09-01 RX ADMIN — FUROSEMIDE 40 MG: 10 INJECTION, SOLUTION INTRAMUSCULAR; INTRAVENOUS at 10:01

## 2023-09-01 RX ADMIN — APIXABAN 2.5 MG: 2.5 TABLET, FILM COATED ORAL at 19:56

## 2023-09-01 RX ADMIN — ROSUVASTATIN CALCIUM 10 MG: 10 TABLET, FILM COATED ORAL at 10:00

## 2023-09-01 RX ADMIN — DILTIAZEM HYDROCHLORIDE 120 MG: 120 CAPSULE, COATED, EXTENDED RELEASE ORAL at 10:00

## 2023-09-01 RX ADMIN — FERROUS SULFATE TAB 325 MG (65 MG ELEMENTAL FE) 325 MG: 325 (65 FE) TAB at 10:00

## 2023-09-01 RX ADMIN — APIXABAN 2.5 MG: 2.5 TABLET, FILM COATED ORAL at 10:00

## 2023-09-01 RX ADMIN — IPRATROPIUM BROMIDE 0.5 MG: 0.5 SOLUTION RESPIRATORY (INHALATION) at 08:47

## 2023-09-01 RX ADMIN — SPIRONOLACTONE 12.5 MG: 25 TABLET ORAL at 10:00

## 2023-09-01 ASSESSMENT — ENCOUNTER SYMPTOMS
SHORTNESS OF BREATH: 0
ABDOMINAL PAIN: 0

## 2023-09-01 NOTE — PROGRESS NOTES
Received call from cardiology regarding ECHO, placed call to that department and patient has been assigned to technmichele frazier.

## 2023-09-01 NOTE — CARE COORDINATION
Reviewed chart, pt on 2LO2 at 96%, RA is baseline, no preference of DME companies if O2 is needed. Pt has walker and cane at home, active , drove self to hospital and will drive herself home. Met with pt to review discharge plan, pt plans on returning home and driving self, she is hopeful that she will not need home O2. HIEU Sterling, ASHLEIGH  . Case Management Assessment  Initial Evaluation    Date/Time of Evaluation: 9/1/2023 2:00 PM  Assessment Completed by: HIEU Sterling, ASHLEIGH    If patient is discharged prior to next notation, then this note serves as note for discharge by case management. Patient Name: Valencia Leary                   YOB: 1940  Diagnosis: Elevated troponin [R77.8]  Acute on chronic heart failure, unspecified heart failure type [facility]) [I50.9]                   Date / Time: 8/29/2023  4:18 PM    Patient Admission Status: Inpatient   Readmission Risk (Low < 19, Mod (19-27), High > 27): Readmission Risk Score: 10.9    Current PCP: Betina Tang MD  PCP verified by CM? Yes    Chart Reviewed: Yes      History Provided by: Patient  Patient Orientation: Alert and Oriented    Patient Cognition: Alert    Hospitalization in the last 30 days (Readmission):  No    If yes, Readmission Assessment in CM Navigator will be completed. Advance Directives:      Code Status: Full Code   Patient's Primary Decision Maker is: Legal Next of Kin    Primary Decision Maker: Antelmo Swanson - [facility] - [phone]    Discharge Planning:    Patient lives with: Alone Type of Home: House  Primary Care Giver: Self  Patient Support Systems include: Friends/Neighbors   Current Financial resources:    Current community resources:    Current services prior to admission: None            Current DME:              Type of Home Care services:  None    ADLS  Prior functional level: Independent in ADLs/IADLs  Current functional level:  Independent in ADLs/IADLs    PT AM-PAC:   /24  OT

## 2023-09-01 NOTE — PROGRESS NOTES
PROGRESS NOTE       PATIENT PROBLEM LIST:  Patient Active Problem List   Diagnosis Code    Acute GI hemorrhage K92.2    Acute renal failure ([facility]) N17.9    Anticoagulated Z79.01    Lactic acidosis E87.20    Dyspnea on exertion R06.09    Pulmonary fibrosis ([facility]) J84.10    Pulmonary emphysema ([facility]) J43.9    Dyspnea R06.00    Atrial fibrillation with rapid ventricular response ([facility]) I48.91    CHF (congestive heart failure), NYHA class I, acute on chronic, combined ([address]) I50.43    Near syncope R55    Thoracic compression fracture, closed, initial encounter ([address]) S22.000A    Compression fracture of body of thoracic vertebra ([address]) S22.000A    Compression fracture of thoracic spine, non-traumatic, initial encounter ([address]) M48.54XA    Acute on chronic heart failure, unspecified heart failure type ([address]) I50.9       SUBJECTIVE:  Shyanne Sober states while she has noted a slight improvement in breathing she is extremely fatigued and has shortness of breath with minimal exertion. She denies any retrosternal chest discomfort nor lightheadedness or palpitations presently. REVIEW OF SYSTEMS:  General ROS: positive for - fatigue. Psychological ROS: negative for - anxiety , depression  Ophthalmic ROS: positive for - decreased vision and utilizes corrective lenses for visual acuity. ENT ROS: negative  Allergy and Immunology ROS: negative  Hematological and Lymphatic ROS: negative  Endocrine: no heat or cold intolerance and no polyphagia, polydipsia, or polyuria  Respiratory ROS: positive for - shortness of breath  Cardiovascular ROS: positive for - dyspnea on exertion, murmur, and shortness of breath.   Gastrointestinal ROS: no abdominal pain, change in bowel habits, or black or bloody stools  Genito-Urinary ROS: no nocturia, dysuria, trouble voiding, frequency or hematuria  Musculoskeletal ROS: negative for- myalgias, arthralgias, or claudication  Neurological ROS: no TIA or stroke symptoms otherwise no significant change in

## 2023-09-02 LAB
ANION GAP SERPL CALCULATED.3IONS-SCNC: 9 MMOL/L (ref 7–16)
BASOPHILS # BLD: 0.02 K/UL (ref 0–0.2)
BASOPHILS NFR BLD: 0 % (ref 0–2)
BUN SERPL-MCNC: 18 MG/DL (ref 6–23)
CALCIUM SERPL-MCNC: 9 MG/DL (ref 8.6–10.2)
CHLORIDE SERPL-SCNC: 99 MMOL/L (ref 98–107)
CO2 SERPL-SCNC: 32 MMOL/L (ref 22–29)
CREAT SERPL-MCNC: 0.8 MG/DL (ref 0.5–1)
EOSINOPHIL # BLD: 0.09 K/UL (ref 0.05–0.5)
EOSINOPHILS RELATIVE PERCENT: 2 % (ref 0–6)
ERYTHROCYTE [DISTWIDTH] IN BLOOD BY AUTOMATED COUNT: 14.2 % (ref 11.5–15)
GFR SERPL CREATININE-BSD FRML MDRD: >60 ML/MIN/1.73M2
GLUCOSE SERPL-MCNC: 104 MG/DL (ref 74–99)
HCT VFR BLD AUTO: 40.9 % (ref 34–48)
HGB BLD-MCNC: 12.6 G/DL (ref 11.5–15.5)
IMM GRANULOCYTES # BLD AUTO: <0.03 K/UL (ref 0–0.58)
IMM GRANULOCYTES NFR BLD: 0 % (ref 0–5)
LYMPHOCYTES NFR BLD: 1.13 K/UL (ref 1.5–4)
LYMPHOCYTES RELATIVE PERCENT: 21 % (ref 20–42)
MCH RBC QN AUTO: 29.6 PG (ref 26–35)
MCHC RBC AUTO-ENTMCNC: 30.8 G/DL (ref 32–34.5)
MCV RBC AUTO: 96.2 FL (ref 80–99.9)
MONOCYTES NFR BLD: 0.74 K/UL (ref 0.1–0.95)
MONOCYTES NFR BLD: 14 % (ref 2–12)
NEUTROPHILS NFR BLD: 63 % (ref 43–80)
NEUTS SEG NFR BLD: 3.33 K/UL (ref 1.8–7.3)
PLATELET # BLD AUTO: 146 K/UL (ref 130–450)
PMV BLD AUTO: 11.2 FL (ref 7–12)
POTASSIUM SERPL-SCNC: 4.3 MMOL/L (ref 3.5–5)
RBC # BLD AUTO: 4.25 M/UL (ref 3.5–5.5)
SODIUM SERPL-SCNC: 140 MMOL/L (ref 132–146)
WBC OTHER # BLD: 5.3 K/UL (ref 4.5–11.5)

## 2023-09-02 PROCEDURE — 6360000002 HC RX W HCPCS: Performed by: FAMILY MEDICINE

## 2023-09-02 PROCEDURE — 6370000000 HC RX 637 (ALT 250 FOR IP): Performed by: FAMILY MEDICINE

## 2023-09-02 PROCEDURE — 80048 BASIC METABOLIC PNL TOTAL CA: CPT

## 2023-09-02 PROCEDURE — 2060000000 HC ICU INTERMEDIATE R&B

## 2023-09-02 PROCEDURE — 2700000000 HC OXYGEN THERAPY PER DAY

## 2023-09-02 PROCEDURE — 85025 COMPLETE CBC W/AUTO DIFF WBC: CPT

## 2023-09-02 PROCEDURE — 36415 COLL VENOUS BLD VENIPUNCTURE: CPT

## 2023-09-02 PROCEDURE — 6370000000 HC RX 637 (ALT 250 FOR IP): Performed by: INTERNAL MEDICINE

## 2023-09-02 RX ORDER — METOPROLOL SUCCINATE 25 MG/1
25 TABLET, EXTENDED RELEASE ORAL DAILY
Status: DISCONTINUED | OUTPATIENT
Start: 2023-09-03 | End: 2023-09-04 | Stop reason: HOSPADM

## 2023-09-02 RX ORDER — METOPROLOL SUCCINATE 50 MG/1
50 TABLET, EXTENDED RELEASE ORAL DAILY
Status: DISCONTINUED | OUTPATIENT
Start: 2023-09-03 | End: 2023-09-02

## 2023-09-02 RX ADMIN — ROSUVASTATIN CALCIUM 10 MG: 10 TABLET, FILM COATED ORAL at 09:02

## 2023-09-02 RX ADMIN — DILTIAZEM HYDROCHLORIDE 120 MG: 120 CAPSULE, COATED, EXTENDED RELEASE ORAL at 09:02

## 2023-09-02 RX ADMIN — APIXABAN 2.5 MG: 2.5 TABLET, FILM COATED ORAL at 09:02

## 2023-09-02 RX ADMIN — FUROSEMIDE 40 MG: 10 INJECTION, SOLUTION INTRAMUSCULAR; INTRAVENOUS at 09:03

## 2023-09-02 RX ADMIN — FERROUS SULFATE TAB 325 MG (65 MG ELEMENTAL FE) 325 MG: 325 (65 FE) TAB at 09:02

## 2023-09-02 RX ADMIN — LISINOPRIL 10 MG: 10 TABLET ORAL at 09:02

## 2023-09-02 RX ADMIN — APIXABAN 2.5 MG: 2.5 TABLET, FILM COATED ORAL at 20:21

## 2023-09-02 RX ADMIN — SPIRONOLACTONE 12.5 MG: 25 TABLET ORAL at 09:03

## 2023-09-02 ASSESSMENT — ENCOUNTER SYMPTOMS
ABDOMINAL PAIN: 0
SHORTNESS OF BREATH: 0

## 2023-09-02 NOTE — PLAN OF CARE
Problem: Discharge Planning  Goal: Discharge to home or other facility with appropriate resources  Outcome: Progressing     Problem: Safety - Adult  Goal: Free from fall injury  Outcome: Progressing     Problem: ABCDS Injury Assessment  Goal: Absence of physical injury  Outcome: Progressing     Problem: Chronic Conditions and Co-morbidities  Goal: Patient's chronic conditions and co-morbidity symptoms are monitored and maintained or improved  Outcome: Progressing no

## 2023-09-03 LAB
25(OH)D3 SERPL-MCNC: 12.7 NG/ML (ref 30–100)
BNP SERPL-MCNC: 894 PG/ML (ref 0–450)

## 2023-09-03 PROCEDURE — 36415 COLL VENOUS BLD VENIPUNCTURE: CPT

## 2023-09-03 PROCEDURE — 6360000002 HC RX W HCPCS: Performed by: FAMILY MEDICINE

## 2023-09-03 PROCEDURE — 83880 ASSAY OF NATRIURETIC PEPTIDE: CPT

## 2023-09-03 PROCEDURE — 6370000000 HC RX 637 (ALT 250 FOR IP): Performed by: INTERNAL MEDICINE

## 2023-09-03 PROCEDURE — 6370000000 HC RX 637 (ALT 250 FOR IP): Performed by: FAMILY MEDICINE

## 2023-09-03 PROCEDURE — 82306 VITAMIN D 25 HYDROXY: CPT

## 2023-09-03 PROCEDURE — 2060000000 HC ICU INTERMEDIATE R&B

## 2023-09-03 PROCEDURE — 2700000000 HC OXYGEN THERAPY PER DAY

## 2023-09-03 RX ORDER — BUMETANIDE 1 MG/1
1 TABLET ORAL DAILY
Status: DISCONTINUED | OUTPATIENT
Start: 2023-09-04 | End: 2023-09-04 | Stop reason: HOSPADM

## 2023-09-03 RX ORDER — METOPROLOL SUCCINATE 25 MG/1
12.5 TABLET, EXTENDED RELEASE ORAL ONCE
Status: COMPLETED | OUTPATIENT
Start: 2023-09-03 | End: 2023-09-03

## 2023-09-03 RX ADMIN — METOPROLOL SUCCINATE 12.5 MG: 25 TABLET, EXTENDED RELEASE ORAL at 10:32

## 2023-09-03 RX ADMIN — ROSUVASTATIN CALCIUM 10 MG: 10 TABLET, FILM COATED ORAL at 08:52

## 2023-09-03 RX ADMIN — FERROUS SULFATE TAB 325 MG (65 MG ELEMENTAL FE) 325 MG: 325 (65 FE) TAB at 08:53

## 2023-09-03 RX ADMIN — FUROSEMIDE 40 MG: 10 INJECTION, SOLUTION INTRAMUSCULAR; INTRAVENOUS at 08:54

## 2023-09-03 RX ADMIN — SPIRONOLACTONE 12.5 MG: 25 TABLET ORAL at 08:54

## 2023-09-03 RX ADMIN — APIXABAN 2.5 MG: 2.5 TABLET, FILM COATED ORAL at 08:52

## 2023-09-03 RX ADMIN — APIXABAN 2.5 MG: 2.5 TABLET, FILM COATED ORAL at 20:19

## 2023-09-03 ASSESSMENT — ENCOUNTER SYMPTOMS
ABDOMINAL PAIN: 0
SHORTNESS OF BREATH: 0

## 2023-09-03 NOTE — PROGRESS NOTES
Dr. Manriquez Fruits that patient's BP was 93/54. I held her metoprolol and her lisinopril. He called with new orders.

## 2023-09-03 NOTE — PLAN OF CARE
Problem: Discharge Planning  Goal: Discharge to home or other facility with appropriate resources  Outcome: Progressing  Flowsheets (Taken 9/3/2023 0800)  Discharge to home or other facility with appropriate resources: Identify barriers to discharge with patient and caregiver     Problem: Safety - Adult  Goal: Free from fall injury  Outcome: Progressing     Problem: ABCDS Injury Assessment  Goal: Absence of physical injury  Outcome: Progressing     Problem: Chronic Conditions and Co-morbidities  Goal: Patient's chronic conditions and co-morbidity symptoms are monitored and maintained or improved  Outcome: Progressing  Flowsheets (Taken 9/3/2023 0800)  Care Plan - Patient's Chronic Conditions and Co-Morbidity Symptoms are Monitored and Maintained or Improved: Monitor and assess patient's chronic conditions and comorbid symptoms for stability, deterioration, or improvement

## 2023-09-03 NOTE — PROGRESS NOTES
PROGRESS NOTE       PATIENT PROBLEM LIST:  Patient Active Problem List   Diagnosis Code    Acute GI hemorrhage K92.2    Acute renal failure ([facility]) N17.9    Anticoagulated Z79.01    Lactic acidosis E87.20    Dyspnea on exertion R06.09    Pulmonary fibrosis ([facility]) J84.10    Pulmonary emphysema ([facility]) J43.9    Dyspnea R06.00    Atrial fibrillation with rapid ventricular response ([facility]) I48.91    CHF (congestive heart failure), NYHA class I, acute on chronic, combined ([address]) I50.43    Near syncope R55    Thoracic compression fracture, closed, initial encounter ([address]) S22.000A    Compression fracture of body of thoracic vertebra ([address]) S22.000A    Compression fracture of thoracic spine, non-traumatic, initial encounter ([address]) M48.54XA    Acute on chronic heart failure, unspecified heart failure type ([address]) I50.9       SUBJECTIVE:  Bakari Hardy states she remains weak and shortness of breath with exertion in the hallway does not feel ready to be discharged. REVIEW OF SYSTEMS:  General ROS: positive for - fatigue,  Psychological ROS: negative for - anxiety , depression  Ophthalmic ROS: negative for - decreased vision or visual distortion. ENT ROS: negative  Allergy and Immunology ROS: negative  Hematological and Lymphatic ROS: negative  Endocrine: no heat or cold intolerance and no polyphagia, polydipsia, or polyuria  Respiratory ROS: positive for - shortness of breath and torsional desaturation  Cardiovascular ROS: positive for - dyspnea on exertion, irregular heartbeat, and shortness of breath. Gastrointestinal ROS: no abdominal pain, change in bowel habits, or black or bloody stools  Genito-Urinary ROS: no nocturia, dysuria, trouble voiding, frequency or hematuria  Musculoskeletal ROS: negative for- myalgias, arthralgias, or claudication  Neurological ROS: no TIA or stroke symptoms otherwise no significant change in symptoms or problems since yesterday as documented in previous progress notes.     SCHEDULED

## 2023-09-03 NOTE — PROGRESS NOTES
4.5 4.3   CL 99 99   CO2 31* 32*   BUN 22 18   CREATININE 0.9 0.8   GLUCOSE 75 104*       PT/INR:No results for input(s): PROTIME, INR in the last 72 hours. APTT:No results for input(s): APTT in the last 72 hours. LIVER PROFILE:  No results for input(s): AST, ALT, BILIDIR, BILITOT, ALKPHOS in the last 72 hours. Imaging Last 24 Hours:  XR CHEST (2 VW)    Result Date: 8/29/2023  EXAMINATION: TWO XRAY VIEWS OF THE CHEST 8/29/2023 6:11 pm COMPARISON: 06/21/2021. HISTORY: ORDERING SYSTEM PROVIDED HISTORY: cp TECHNOLOGIST PROVIDED HISTORY: Reason for exam:->cp What reading provider will be dictating this exam?->CRC FINDINGS: The lungs are without acute focal process. There is no effusion or pneumothorax. Cardiomegaly. The osseous structures are without acute process. Degenerative changes thoracic spine with chronic appearing compression deformities and multilevel vertebroplasties. No acute process. CTA CHEST W CONTRAST    Result Date: 8/29/2023  EXAMINATION: CTA OF THE CHEST 8/29/2023 9:09 pm TECHNIQUE: CTA of the chest was performed after the administration of intravenous contrast.  Multiplanar reformatted images are provided for review. MIP images are provided for review. Automated exposure control, iterative reconstruction, and/or weight based adjustment of the mA/kV was utilized to reduce the radiation dose to as low as reasonably achievable. COMPARISON: None. HISTORY: ORDERING SYSTEM PROVIDED HISTORY: sob TECHNOLOGIST PROVIDED HISTORY: Reason for exam:->sob Decision Support Exception - unselect if not a suspected or confirmed emergency medical condition->Emergency Medical Condition (MA) What reading provider will be dictating this exam?->CRC FINDINGS: Pulmonary Arteries: Pulmonary arteries are adequately opacified for evaluation. No evidence of intraluminal filling defect to suggest pulmonary embolism. Main pulmonary artery is normal in caliber.  Mediastinum: No evidence of mediastinal lymphadenopathy. The heart and pericardium demonstrate no acute abnormality. There is no acute abnormality of the thoracic aorta. Lungs/pleura: The lungs are without acute process. There is minimal right apical pleural thickening that is subpleural in location measuring 5 x 3 mm. There are underlying signs of centrilobular emphysema. Minimal subpleural honeycombing extend of the lung bases. There is minimal atelectasis within the right middle lobe. No consolidation masses or pleural effusions were observed. The heart size is prominent. There are no signs of pericardial effusion. No significant coronary artery calcifications are identified. There are borderline lymph nodes in the right hilar region measuring 14 mm no focal consolidation or pulmonary edema. No evidence of pleural effusion or pneumothorax. Upper Abdomen: Limited images of the upper abdomen reveals no acute abnormalities of the liver. There are no signs of a discrete duct dilation or mass. The visualized pancreas spleen and adrenal glands demonstrate no acute abnormalities. The right kidney reveals a low-density focus extending from the superior pole measuring 40 mm. This could represent a cyst Soft Tissues/Bones: There are compression fractures involving T6, T7, T8 and T11. There are treated fractures of T12 and L1. There is a remote appearing fracture of L2. If the patient is symptomatic, MRI would be of increased sensitivity. 1.  No evidence of pulmonary embolism. 2.  Mild cardiomegaly with signs of left atrial enlargement. 3.  Minimal linear density in the right upper lung that is subpleural in location and most likely represents scarring 4. Borderline right hilar lymphadenopathy. A follow-up CT scan of the chest in 6 months may be helpful to determine the stability of this region.  5.  There are multiple compression fractures of the dorsal spine and correlation with MRI may be helpful in further characterization if there is

## 2023-09-03 NOTE — CONSULTS
[facility]  [address], [address]  [facility]    Pulmonary Consultation      Reason for Consult:  sob    Requesting Physician:  Amanda Bingham MD    CHIEF COMPLAINT:  sob    History Obtained From:  patient    HISTORY OF PRESENT ILLNESS:                The patient is a 80 y.o. female who presents with increased sob for about 5 days which she describes as waking up early morning gasping for air. States was started on albuterol mdi the past year as would have problems breathing when she would be in a hurry and use the mdi about monthly, initially said would not be sob in any other situation then later said would also be slightly sob with other types of activity that she doesn't hurry (walking in supermarket, back and forth to car). Mention of eliquis for recent DVT left leg ([facility] dopplers on 7/24 mentions no DVT). Admitted as CHF and Afib, being diuresed, on NC, claims doing better with breathing and not waking up at night feeling sob. Mentions trying to rush and talk to Cardiology yesterday that made her sob so discharge held. Currently on 2 li NC sitting up at side of bed, mention by nurse of hypoxia when ambulates, not much coughing currently.      Past Medical History:        Diagnosis Date    Atrial fibrillation [facility])        Past Surgical History:        Procedure Laterality Date    UPPER GASTROINTESTINAL ENDOSCOPY  07/25/2017       Current Medications:    Current Facility-Administered Medications: metoprolol succinate (TOPROL XL) extended release tablet 25 mg, 25 mg, Oral, Daily  ipratropium (ATROVENT) 0.02 % nebulizer solution 0.5 mg, 0.5 mg, Nebulization, Q4H PRN **AND** arformoterol tartrate (BROVANA) nebulizer solution 15 mcg, 15 mcg, Nebulization, BID PRN  perflutren lipid microspheres (DEFINITY) injection 1.5 mL, 1.5 mL, IntraVENous, ONCE PRN  spironolactone (ALDACTONE) tablet 12.5 mg, 12.5 mg, Oral, Daily  apixaban

## 2023-09-04 VITALS
WEIGHT: 108 LBS | SYSTOLIC BLOOD PRESSURE: 117 MMHG | HEIGHT: 61 IN | HEART RATE: 108 BPM | OXYGEN SATURATION: 97 % | TEMPERATURE: 97.2 F | RESPIRATION RATE: 17 BRPM | BODY MASS INDEX: 20.39 KG/M2 | DIASTOLIC BLOOD PRESSURE: 66 MMHG

## 2023-09-04 PROCEDURE — 6370000000 HC RX 637 (ALT 250 FOR IP): Performed by: FAMILY MEDICINE

## 2023-09-04 PROCEDURE — 6370000000 HC RX 637 (ALT 250 FOR IP): Performed by: INTERNAL MEDICINE

## 2023-09-04 PROCEDURE — 2700000000 HC OXYGEN THERAPY PER DAY

## 2023-09-04 RX ORDER — METOPROLOL SUCCINATE 25 MG/1
25 TABLET, EXTENDED RELEASE ORAL DAILY
Qty: 30 TABLET | Refills: 3 | Status: SHIPPED | OUTPATIENT
Start: 2023-09-05 | End: 2023-09-15

## 2023-09-04 RX ORDER — SPIRONOLACTONE 25 MG/1
12.5 TABLET ORAL DAILY
Qty: 30 TABLET | Refills: 3 | Status: SHIPPED | OUTPATIENT
Start: 2023-09-05 | End: 2023-09-15

## 2023-09-04 RX ORDER — BUMETANIDE 1 MG/1
1 TABLET ORAL DAILY
Qty: 30 TABLET | Refills: 3 | Status: SHIPPED | OUTPATIENT
Start: 2023-09-05 | End: 2023-09-15

## 2023-09-04 RX ADMIN — Medication 5000 UNITS: at 09:26

## 2023-09-04 RX ADMIN — BUMETANIDE 1 MG: 1 TABLET ORAL at 09:25

## 2023-09-04 RX ADMIN — FERROUS SULFATE TAB 325 MG (65 MG ELEMENTAL FE) 325 MG: 325 (65 FE) TAB at 09:24

## 2023-09-04 RX ADMIN — METOPROLOL SUCCINATE 25 MG: 25 TABLET, EXTENDED RELEASE ORAL at 09:25

## 2023-09-04 RX ADMIN — ROSUVASTATIN CALCIUM 10 MG: 10 TABLET, FILM COATED ORAL at 09:24

## 2023-09-04 RX ADMIN — APIXABAN 2.5 MG: 2.5 TABLET, FILM COATED ORAL at 09:26

## 2023-09-04 RX ADMIN — LISINOPRIL 10 MG: 10 TABLET ORAL at 09:25

## 2023-09-04 RX ADMIN — SPIRONOLACTONE 12.5 MG: 25 TABLET ORAL at 09:24

## 2023-09-04 ASSESSMENT — ENCOUNTER SYMPTOMS
SHORTNESS OF BREATH: 0
ABDOMINAL PAIN: 0

## 2023-09-04 NOTE — DISCHARGE SUMMARY
Discharge Summary    Date: 9/4/2023  Patient Name: Kimberly Ayers    YOB: 1940     Age: 80 y.o. Admit Date: 8/29/2023  Discharge Date: 9/4/2023  Discharge Condition: Stable    Admission Diagnosis  Elevated troponin [R77.8]; Acute on chronic heart failure, unspecified heart failure type ([address]) [I50.9]      Discharge Diagnosis  Principal Problem:    Acute on chronic heart failure, unspecified heart failure type ([address])  Resolved Problems:    * No resolved hospital problems. [facility] Stay  Narrative of Hospital Course:  Patient admitted with shortness of breath  Cardiology followed for CHF  Improved with IV diuresis. Pulmonary also followed. Started on stiolo and oxgen. PFT's as outpatient. Consultants:  IP CONSULT TO INTERNAL MEDICINE  IP CONSULT TO CARDIOLOGY  IP CONSULT TO HEART FAILURE NURSE/COORDINATOR  IP CONSULT TO PULMONOLOGY    Surgeries/procedures Performed:      Treatments:            Discharge Plan/Disposition:  Home    Hospital/Incidental Findings Requiring Follow Up:    Patient Instructions:    Diet: Cardiac Diet    Activity:Activity as Tolerated  For number of days (if applicable): Other Instructions:    Provider Follow-Up:   No follow-ups on file.      Significant Diagnostic Studies:    Recent Labs:  Admission on 08/29/2023  Ventricular Rate                              Date: 08/29/2023  Value: 87          Ref range: BPM                Status: Final  Atrial Rate                                   Date: 08/29/2023  Value: 227         Ref range: BPM                Status: Final  QRS Duration                                  Date: 08/29/2023  Value: 70          Ref range: ms                 Status: Final  Q-T Interval                                  Date: 08/29/2023  Value: 378         Ref range: ms                 Status: Final  QTc Calculation (Bazett)                      Date: 08/29/2023  Value: 454         Ref range: ms                 Status: Final  R Axis Date: 08/31/2023  Value: 14.4        Ref range: 11.5 - 15.0 %      Status: Final  MPV                                           Date: 08/31/2023  Value: 11.1        Ref range: 7.0 - 12.0 fL      Status: Final  Platelet, Fluorescence                        Date: 08/31/2023  Value: 156         Ref range: 130 - 450 k/uL     Status: Final  Neutrophils %                                 Date: 08/31/2023  Value: 77          Ref range: 43.0 - 80.0 %      Status: Final  Lymphocytes %                                 Date: 08/31/2023  Value: 13 (L)      Ref range: 20.0 - 42.0 %      Status: Final  Monocytes %                                   Date: 08/31/2023  Value: 9           Ref range: 2.0 - 12.0 %       Status: Final  Eosinophils %                                 Date: 08/31/2023  Value: 0           Ref range: 0 - 6 %            Status: Final  Basophils %                                   Date: 08/31/2023  Value: 0           Ref range: 0.0 - 2.0 %        Status: Final  Immature Granulocytes                         Date: 08/31/2023  Value: 0           Ref range: 0.0 - 5.0 %        Status: Final  Neutrophils Absolute                          Date: 08/31/2023  Value: 6.97        Ref range: 1.80 - 7.30 k/uL   Status: Final  Lymphocytes Absolute                          Date: 08/31/2023  Value: 1.18 (L)    Ref range: 1.50 - 4.00 k/uL   Status: Final  Monocytes Absolute                            Date: 08/31/2023  Value: 0.81        Ref range: 0.10 - 0.95 k/uL   Status: Final  Eosinophils Absolute                          Date: 08/31/2023  Value: 0.00 (L)    Ref range: 0.05 - 0.50 k/uL   Status: Final  Basophils Absolute                            Date: 08/31/2023  Value: 0.02        Ref range: 0.00 - 0.20 k/uL   Status: Final  Absolute Immature Granulocyte                 Date: 08/31/2023  Value: 0.03        Ref range: 0.00 - 0.58 k/uL   Status: Final  Glucose

## 2023-09-04 NOTE — PROGRESS NOTES
Notified Dr. Anne Borne that patient received her oxygen and is ok for discharge from pulmonology and cardiology.

## 2023-09-04 NOTE — PROGRESS NOTES
09/01/23  0645 09/02/23  0619    140   K 4.5 4.3   CL 99 99   CO2 31* 32*   BUN 22 18   CREATININE 0.9 0.8   LABGLOM >60 >60     ABGs: No results found for: PH, PO2, PCO2  INR: No results for input(s): INR in the last 72 hours. PRO-BNP:   Lab Results   Component Value Date    PROBNP 894 (H) 09/03/2023    PROBNP 4,040 (H) 08/29/2023      TSH:   Lab Results   Component Value Date    TSH 1.870 01/22/2018      Cardiac Injury Profile: No results for input(s): TROPHS in the last 72 hours. Lipid Profile: No results found for: TRIG, HDL, LDLCALC, CHOL   Hemoglobin A1C: No components found for: HGBA1C      RAD:   No results found. EKG: See Report  Echo: See Report      IMPRESSIONS:  Patient Active Problem List   Diagnosis Code    Acute GI hemorrhage K92.2    Acute renal failure ([facility]) N17.9    Anticoagulated Z79.01    Lactic acidosis E87.20    Dyspnea on exertion R06.09    Pulmonary fibrosis ([facility]) J84.10    Pulmonary emphysema ([facility]) J43.9    Dyspnea R06.00    Atrial fibrillation with rapid ventricular response ([facility]) I48.91    CHF (congestive heart failure), NYHA class I, acute on chronic, combined ([address]) I50.43    Near syncope R55    Thoracic compression fracture, closed, initial encounter ([address]) S22.000A    Compression fracture of body of thoracic vertebra ([address]) S22.000A    Compression fracture of thoracic spine, non-traumatic, initial encounter ([address]) M48.54XA    Acute on chronic heart failure, unspecified heart failure type ([address]) I50.9       RECOMMENDATIONS:  Mrs. Porter Lee has an interesting situation in that now her proBNP is upper limits of normal to minimally elevated for her age group. Additionally her vitamin D level is excessively low and will be supplemented. She is now diverted her attention to pulmonary problems but an attempt will be made to obtain pulmonary function studies tomorrow morning and if not successful would consider discharge.   I have spent more than 26 minutes face to face with Alana Reyes Fermin Shaw and reviewing notes and laboratory data, with greater than 50% of this time instructing and counseling the patient face to face regarding my findings and recommendations and I have answered all questions as posed to me by Ms. Fermin Shaw. Celio Calderon, DO FACP,FACC,[facility]      NOTE:  This report was transcribed using voice recognition software.   Every effort was made to ensure accuracy; however, inadvertent computerized transcription errors may be present

## 2023-09-04 NOTE — PROGRESS NOTES
Notified  that patient will need 3L oxygen at discharge. We will need a DME order. Dr. Marcia Villarreal ordered outpatient PFT. Spoke with Villa Wiseman. He said we can call [phone] if patient discharging today and need the oxygen for D/C.

## 2023-09-04 NOTE — PROGRESS NOTES
Patient sitting on room air SPO2 87%  Patient ambulating on room air SPO2 84%  Patient ambulating on 3L SPO2 93%  Patient sitting recovery on 3L spo2 95%

## 2023-09-05 ENCOUNTER — APPOINTMENT (OUTPATIENT)
Dept: GENERAL RADIOLOGY | Age: 83
End: 2023-09-05
Payer: MEDICARE

## 2023-09-05 ENCOUNTER — HOSPITAL ENCOUNTER (INPATIENT)
Age: 83
LOS: 9 days | End: 2023-09-14
Attending: EMERGENCY MEDICINE | Admitting: FAMILY MEDICINE
Payer: MEDICARE

## 2023-09-05 ENCOUNTER — APPOINTMENT (OUTPATIENT)
Dept: CT IMAGING | Age: 83
End: 2023-09-05
Payer: MEDICARE

## 2023-09-05 DIAGNOSIS — S72.001A CLOSED FRACTURE OF RIGHT HIP, INITIAL ENCOUNTER (HCC): Primary | ICD-10-CM
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
## 2023-09-05 LAB
ANION GAP SERPL CALCULATED.3IONS-SCNC: 10 MMOL/L (ref 7–16)
BASOPHILS # BLD: 0.03 K/UL (ref 0–0.2)
BASOPHILS NFR BLD: 0 % (ref 0–2)
BUN SERPL-MCNC: 30 MG/DL (ref 6–23)
CALCIUM SERPL-MCNC: 9.4 MG/DL (ref 8.6–10.2)
CHLORIDE SERPL-SCNC: 94 MMOL/L (ref 98–107)
CO2 SERPL-SCNC: 32 MMOL/L (ref 22–29)
CREAT SERPL-MCNC: 1 MG/DL (ref 0.5–1)
EOSINOPHIL # BLD: 0.07 K/UL (ref 0.05–0.5)
EOSINOPHILS RELATIVE PERCENT: 1 % (ref 0–6)
ERYTHROCYTE [DISTWIDTH] IN BLOOD BY AUTOMATED COUNT: 13.9 % (ref 11.5–15)
GFR SERPL CREATININE-BSD FRML MDRD: 59 ML/MIN/1.73M2
GLUCOSE SERPL-MCNC: 90 MG/DL (ref 74–99)
HCT VFR BLD AUTO: 41.3 % (ref 34–48)
HGB BLD-MCNC: 13 G/DL (ref 11.5–15.5)
IMM GRANULOCYTES # BLD AUTO: 0.06 K/UL (ref 0–0.58)
IMM GRANULOCYTES NFR BLD: 1 % (ref 0–5)
INR PPP: 1.1
LYMPHOCYTES NFR BLD: 0.96 K/UL (ref 1.5–4)
LYMPHOCYTES RELATIVE PERCENT: 8 % (ref 20–42)
MCH RBC QN AUTO: 29.7 PG (ref 26–35)
MCHC RBC AUTO-ENTMCNC: 31.5 G/DL (ref 32–34.5)
MCV RBC AUTO: 94.5 FL (ref 80–99.9)
MONOCYTES NFR BLD: 0.91 K/UL (ref 0.1–0.95)
MONOCYTES NFR BLD: 8 % (ref 2–12)
NEUTROPHILS NFR BLD: 83 % (ref 43–80)
NEUTS SEG NFR BLD: 9.74 K/UL (ref 1.8–7.3)
PLATELET # BLD AUTO: 186 K/UL (ref 130–450)
PMV BLD AUTO: 10.6 FL (ref 7–12)
POTASSIUM SERPL-SCNC: 4.6 MMOL/L (ref 3.5–5)
PROTHROMBIN TIME: 12.2 SEC (ref 9.3–12.4)
RBC # BLD AUTO: 4.37 M/UL (ref 3.5–5.5)
SODIUM SERPL-SCNC: 136 MMOL/L (ref 132–146)
WBC OTHER # BLD: 11.8 K/UL (ref 4.5–11.5)

## 2023-09-05 PROCEDURE — 80048 BASIC METABOLIC PNL TOTAL CA: CPT

## 2023-09-05 PROCEDURE — 71045 X-RAY EXAM CHEST 1 VIEW: CPT

## 2023-09-05 PROCEDURE — 73560 X-RAY EXAM OF KNEE 1 OR 2: CPT

## 2023-09-05 PROCEDURE — 73552 X-RAY EXAM OF FEMUR 2/>: CPT

## 2023-09-05 PROCEDURE — 1200000000 HC SEMI PRIVATE

## 2023-09-05 PROCEDURE — 85610 PROTHROMBIN TIME: CPT

## 2023-09-05 PROCEDURE — 6360000002 HC RX W HCPCS: Performed by: STUDENT IN AN ORGANIZED HEALTH CARE EDUCATION/TRAINING PROGRAM

## 2023-09-05 PROCEDURE — 90471 IMMUNIZATION ADMIN: CPT | Performed by: STUDENT IN AN ORGANIZED HEALTH CARE EDUCATION/TRAINING PROGRAM

## 2023-09-05 PROCEDURE — 73502 X-RAY EXAM HIP UNI 2-3 VIEWS: CPT

## 2023-09-05 PROCEDURE — 93005 ELECTROCARDIOGRAM TRACING: CPT | Performed by: EMERGENCY MEDICINE

## 2023-09-05 PROCEDURE — 70450 CT HEAD/BRAIN W/O DYE: CPT

## 2023-09-05 PROCEDURE — 90714 TD VACC NO PRESV 7 YRS+ IM: CPT | Performed by: STUDENT IN AN ORGANIZED HEALTH CARE EDUCATION/TRAINING PROGRAM

## 2023-09-05 PROCEDURE — 73501 X-RAY EXAM HIP UNI 1 VIEW: CPT

## 2023-09-05 PROCEDURE — 72125 CT NECK SPINE W/O DYE: CPT

## 2023-09-05 PROCEDURE — 99285 EMERGENCY DEPT VISIT HI MDM: CPT

## 2023-09-05 PROCEDURE — 85025 COMPLETE CBC W/AUTO DIFF WBC: CPT

## 2023-09-05 RX ORDER — MORPHINE SULFATE 4 MG/ML
4 INJECTION, SOLUTION INTRAMUSCULAR; INTRAVENOUS ONCE
Status: DISCONTINUED | OUTPATIENT
Start: 2023-09-05 | End: 2023-09-05

## 2023-09-05 RX ORDER — FENTANYL CITRATE 50 UG/ML
50 INJECTION, SOLUTION INTRAMUSCULAR; INTRAVENOUS ONCE
Status: COMPLETED | OUTPATIENT
Start: 2023-09-05 | End: 2023-09-05

## 2023-09-05 RX ADMIN — CLOSTRIDIUM TETANI TOXOID ANTIGEN (FORMALDEHYDE INACTIVATED) AND CORYNEBACTERIUM DIPHTHERIAE TOXOID ANTIGEN (FORMALDEHYDE INACTIVATED) 0.5 ML: 5; 2 INJECTION, SUSPENSION INTRAMUSCULAR at 19:08

## 2023-09-05 RX ADMIN — FENTANYL CITRATE 50 MCG: 50 INJECTION, SOLUTION INTRAMUSCULAR; INTRAVENOUS at 19:50

## 2023-09-05 ASSESSMENT — PAIN DESCRIPTION - LOCATION: LOCATION: HIP

## 2023-09-05 ASSESSMENT — LIFESTYLE VARIABLES: HOW MANY STANDARD DRINKS CONTAINING ALCOHOL DO YOU HAVE ON A TYPICAL DAY: 1 OR 2

## 2023-09-05 ASSESSMENT — PAIN DESCRIPTION - ONSET: ONSET: ON-GOING

## 2023-09-05 ASSESSMENT — PAIN DESCRIPTION - FREQUENCY: FREQUENCY: CONTINUOUS

## 2023-09-05 ASSESSMENT — PAIN SCALES - GENERAL
PAINLEVEL_OUTOF10: 6
PAINLEVEL_OUTOF10: 6

## 2023-09-05 ASSESSMENT — PAIN DESCRIPTION - PAIN TYPE: TYPE: ACUTE PAIN

## 2023-09-05 ASSESSMENT — PAIN DESCRIPTION - ORIENTATION: ORIENTATION: RIGHT

## 2023-09-05 ASSESSMENT — PAIN - FUNCTIONAL ASSESSMENT: PAIN_FUNCTIONAL_ASSESSMENT: 0-10

## 2023-09-05 NOTE — PROGRESS NOTES
PROGRESS NOTE       PATIENT PROBLEM LIST:  Patient Active Problem List   Diagnosis Code    Acute GI hemorrhage K92.2    Acute renal failure ([facility]) N17.9    Anticoagulated Z79.01    Lactic acidosis E87.20    Dyspnea on exertion R06.09    Pulmonary fibrosis ([facility]) J84.10    Pulmonary emphysema ([facility]) J43.9    Dyspnea R06.00    Atrial fibrillation with rapid ventricular response ([facility]) I48.91    CHF (congestive heart failure), NYHA class I, acute on chronic, combined ([address]) I50.43    Near syncope R55    Thoracic compression fracture, closed, initial encounter ([address]) S22.000A    Compression fracture of body of thoracic vertebra ([address]) S22.000A    Compression fracture of thoracic spine, non-traumatic, initial encounter ([address]) M48.54XA    Acute on chronic heart failure, unspecified heart failure type ([address]) I50.9       SUBJECTIVE:  Taylor Zepeda states she is able to walk but remains upset that she could not have her pulmonary function studies obtained while over the holiday. .She denies any chest discomfort presently nor palpitations or lightheadedness. REVIEW OF SYSTEMS:  General ROS: positive for - fatigue,  Psychological ROS: negative for - anxiety , depression  Ophthalmic ROS: negative for - decreased vision or visual distortion. ENT ROS: negative  Allergy and Immunology ROS: negative  Hematological and Lymphatic ROS: negative  Endocrine: no heat or cold intolerance and no polyphagia, polydipsia, or polyuria  Respiratory ROS: positive for - shortness of breath and exertional desaturation  Cardiovascular ROS: positive for - dyspnea on exertion, irregular heartbeat, and shortness of breath - mild improvement.   Gastrointestinal ROS: no abdominal pain, change in bowel habits, or black or bloody stools  Genito-Urinary ROS: no nocturia, dysuria, trouble voiding, frequency or hematuria  Musculoskeletal ROS: negative for- myalgias, arthralgias, or claudication  Neurological ROS: no TIA or stroke symptoms otherwise no
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
## 2023-09-06 ENCOUNTER — APPOINTMENT (OUTPATIENT)
Dept: GENERAL RADIOLOGY | Age: 83
End: 2023-09-06
Payer: MEDICARE

## 2023-09-06 ENCOUNTER — ANESTHESIA EVENT (OUTPATIENT)
Dept: OPERATING ROOM | Age: 83
End: 2023-09-06
Payer: MEDICARE

## 2023-09-06 ENCOUNTER — ANESTHESIA (OUTPATIENT)
Dept: OPERATING ROOM | Age: 83
End: 2023-09-06
Payer: MEDICARE

## 2023-09-06 PROBLEM — I50.9 HEART FAILURE (HCC): Status: ACTIVE | Noted: 2023-09-06

## 2023-09-06 PROBLEM — Z86.718 HX OF BLOOD CLOTS: Status: ACTIVE | Noted: 2023-09-06

## 2023-09-06 PROBLEM — I48.91 ATRIAL FIBRILLATION (HCC): Status: ACTIVE | Noted: 2023-09-06

## 2023-09-06 LAB
ABO + RH BLD: NORMAL
ANION GAP SERPL CALCULATED.3IONS-SCNC: 10 MMOL/L (ref 7–16)
ARM BAND NUMBER: NORMAL
BASOPHILS # BLD: 0.04 K/UL (ref 0–0.2)
BASOPHILS NFR BLD: 0 % (ref 0–2)
BLOOD BANK SAMPLE EXPIRATION: NORMAL
BLOOD GROUP ANTIBODIES SERPL: NEGATIVE
BUN SERPL-MCNC: 28 MG/DL (ref 6–23)
CALCIUM SERPL-MCNC: 9 MG/DL (ref 8.6–10.2)
CHLORIDE SERPL-SCNC: 98 MMOL/L (ref 98–107)
CO2 SERPL-SCNC: 31 MMOL/L (ref 22–29)
CREAT SERPL-MCNC: 0.8 MG/DL (ref 0.5–1)
EKG ATRIAL RATE: 227 BPM
EKG Q-T INTERVAL: 274 MS
EKG QRS DURATION: 58 MS
EKG QTC CALCULATION (BAZETT): 368 MS
EKG R AXIS: 111 DEGREES
EKG T AXIS: -32 DEGREES
EKG VENTRICULAR RATE: 109 BPM
EOSINOPHIL # BLD: 0.15 K/UL (ref 0.05–0.5)
EOSINOPHILS RELATIVE PERCENT: 2 % (ref 0–6)
ERYTHROCYTE [DISTWIDTH] IN BLOOD BY AUTOMATED COUNT: 14.1 % (ref 11.5–15)
GFR SERPL CREATININE-BSD FRML MDRD: >60 ML/MIN/1.73M2
GLUCOSE SERPL-MCNC: 102 MG/DL (ref 74–99)
HCT VFR BLD AUTO: 38.2 % (ref 34–48)
HGB BLD-MCNC: 12.1 G/DL (ref 11.5–15.5)
IMM GRANULOCYTES # BLD AUTO: 0.03 K/UL (ref 0–0.58)
IMM GRANULOCYTES NFR BLD: 0 % (ref 0–5)
LYMPHOCYTES NFR BLD: 0.83 K/UL (ref 1.5–4)
LYMPHOCYTES RELATIVE PERCENT: 9 % (ref 20–42)
MCH RBC QN AUTO: 29.8 PG (ref 26–35)
MCHC RBC AUTO-ENTMCNC: 31.7 G/DL (ref 32–34.5)
MCV RBC AUTO: 94.1 FL (ref 80–99.9)
MONOCYTES NFR BLD: 0.89 K/UL (ref 0.1–0.95)
MONOCYTES NFR BLD: 9 % (ref 2–12)
NEUTROPHILS NFR BLD: 80 % (ref 43–80)
NEUTS SEG NFR BLD: 7.8 K/UL (ref 1.8–7.3)
PARTIAL THROMBOPLASTIN TIME: 31.3 SEC (ref 24.5–35.1)
PLATELET # BLD AUTO: 170 K/UL (ref 130–450)
PMV BLD AUTO: 10.8 FL (ref 7–12)
POTASSIUM SERPL-SCNC: 4.9 MMOL/L (ref 3.5–5)
RBC # BLD AUTO: 4.06 M/UL (ref 3.5–5.5)
SODIUM SERPL-SCNC: 139 MMOL/L (ref 132–146)
WBC OTHER # BLD: 9.7 K/UL (ref 4.5–11.5)

## 2023-09-06 PROCEDURE — 86850 RBC ANTIBODY SCREEN: CPT

## 2023-09-06 PROCEDURE — 94640 AIRWAY INHALATION TREATMENT: CPT

## 2023-09-06 PROCEDURE — 85025 COMPLETE CBC W/AUTO DIFF WBC: CPT

## 2023-09-06 PROCEDURE — 7100000000 HC PACU RECOVERY - FIRST 15 MIN: Performed by: STUDENT IN AN ORGANIZED HEALTH CARE EDUCATION/TRAINING PROGRAM

## 2023-09-06 PROCEDURE — C1713 ANCHOR/SCREW BN/BN,TIS/BN: HCPCS | Performed by: STUDENT IN AN ORGANIZED HEALTH CARE EDUCATION/TRAINING PROGRAM

## 2023-09-06 PROCEDURE — 2580000003 HC RX 258: Performed by: SPECIALIST/TECHNOLOGIST

## 2023-09-06 PROCEDURE — 6370000000 HC RX 637 (ALT 250 FOR IP): Performed by: FAMILY MEDICINE

## 2023-09-06 PROCEDURE — C1769 GUIDE WIRE: HCPCS | Performed by: STUDENT IN AN ORGANIZED HEALTH CARE EDUCATION/TRAINING PROGRAM

## 2023-09-06 PROCEDURE — 99221 1ST HOSP IP/OBS SF/LOW 40: CPT | Performed by: STUDENT IN AN ORGANIZED HEALTH CARE EDUCATION/TRAINING PROGRAM

## 2023-09-06 PROCEDURE — 6360000002 HC RX W HCPCS: Performed by: FAMILY MEDICINE

## 2023-09-06 PROCEDURE — 3600000006 HC SURGERY LEVEL 6 BASE: Performed by: STUDENT IN AN ORGANIZED HEALTH CARE EDUCATION/TRAINING PROGRAM

## 2023-09-06 PROCEDURE — 6360000002 HC RX W HCPCS: Performed by: SPECIALIST/TECHNOLOGIST

## 2023-09-06 PROCEDURE — 2580000003 HC RX 258

## 2023-09-06 PROCEDURE — C9399 UNCLASSIFIED DRUGS OR BIOLOG: HCPCS

## 2023-09-06 PROCEDURE — 93010 ELECTROCARDIOGRAM REPORT: CPT | Performed by: INTERNAL MEDICINE

## 2023-09-06 PROCEDURE — 0QS606Z REPOSITION RIGHT UPPER FEMUR WITH INTRAMEDULLARY INTERNAL FIXATION DEVICE, OPEN APPROACH: ICD-10-PCS | Performed by: STUDENT IN AN ORGANIZED HEALTH CARE EDUCATION/TRAINING PROGRAM

## 2023-09-06 PROCEDURE — 73552 X-RAY EXAM OF FEMUR 2/>: CPT

## 2023-09-06 PROCEDURE — 6360000002 HC RX W HCPCS: Performed by: STUDENT IN AN ORGANIZED HEALTH CARE EDUCATION/TRAINING PROGRAM

## 2023-09-06 PROCEDURE — 36415 COLL VENOUS BLD VENIPUNCTURE: CPT

## 2023-09-06 PROCEDURE — 3700000001 HC ADD 15 MINUTES (ANESTHESIA): Performed by: STUDENT IN AN ORGANIZED HEALTH CARE EDUCATION/TRAINING PROGRAM

## 2023-09-06 PROCEDURE — 73502 X-RAY EXAM HIP UNI 2-3 VIEWS: CPT

## 2023-09-06 PROCEDURE — 2580000003 HC RX 258: Performed by: STUDENT IN AN ORGANIZED HEALTH CARE EDUCATION/TRAINING PROGRAM

## 2023-09-06 PROCEDURE — 86901 BLOOD TYPING SEROLOGIC RH(D): CPT

## 2023-09-06 PROCEDURE — 86900 BLOOD TYPING SEROLOGIC ABO: CPT

## 2023-09-06 PROCEDURE — 80048 BASIC METABOLIC PNL TOTAL CA: CPT

## 2023-09-06 PROCEDURE — 2500000003 HC RX 250 WO HCPCS

## 2023-09-06 PROCEDURE — 2709999900 HC NON-CHARGEABLE SUPPLY: Performed by: STUDENT IN AN ORGANIZED HEALTH CARE EDUCATION/TRAINING PROGRAM

## 2023-09-06 PROCEDURE — 72170 X-RAY EXAM OF PELVIS: CPT

## 2023-09-06 PROCEDURE — 94664 DEMO&/EVAL PT USE INHALER: CPT

## 2023-09-06 PROCEDURE — 7100000001 HC PACU RECOVERY - ADDTL 15 MIN: Performed by: STUDENT IN AN ORGANIZED HEALTH CARE EDUCATION/TRAINING PROGRAM

## 2023-09-06 PROCEDURE — 2720000010 HC SURG SUPPLY STERILE: Performed by: STUDENT IN AN ORGANIZED HEALTH CARE EDUCATION/TRAINING PROGRAM

## 2023-09-06 PROCEDURE — 3700000000 HC ANESTHESIA ATTENDED CARE: Performed by: STUDENT IN AN ORGANIZED HEALTH CARE EDUCATION/TRAINING PROGRAM

## 2023-09-06 PROCEDURE — 6360000002 HC RX W HCPCS

## 2023-09-06 PROCEDURE — 27245 TREAT THIGH FRACTURE: CPT | Performed by: STUDENT IN AN ORGANIZED HEALTH CARE EDUCATION/TRAINING PROGRAM

## 2023-09-06 PROCEDURE — 85730 THROMBOPLASTIN TIME PARTIAL: CPT

## 2023-09-06 PROCEDURE — 2500000003 HC RX 250 WO HCPCS: Performed by: STUDENT IN AN ORGANIZED HEALTH CARE EDUCATION/TRAINING PROGRAM

## 2023-09-06 PROCEDURE — 2700000000 HC OXYGEN THERAPY PER DAY

## 2023-09-06 PROCEDURE — 1200000000 HC SEMI PRIVATE

## 2023-09-06 PROCEDURE — 3600000016 HC SURGERY LEVEL 6 ADDTL 15MIN: Performed by: STUDENT IN AN ORGANIZED HEALTH CARE EDUCATION/TRAINING PROGRAM

## 2023-09-06 DEVICE — IMPLANTABLE DEVICE: Type: IMPLANTABLE DEVICE | Site: HIP | Status: FUNCTIONAL

## 2023-09-06 DEVICE — SCREW BNE L40MM DIA5MM ST TIB LT GRN TI ST CANN LOK FULL: Type: IMPLANTABLE DEVICE | Site: HIP | Status: FUNCTIONAL

## 2023-09-06 DEVICE — SCREW BNE L100MM DIA10.35MM G TI CANN PERF FOR PROX FEM: Type: IMPLANTABLE DEVICE | Site: HIP | Status: FUNCTIONAL

## 2023-09-06 DEVICE — SCREW BNE L38MM DIA5MM TIB LT GRN TI ST CANN LOK FULL THRD: Type: IMPLANTABLE DEVICE | Site: HIP | Status: FUNCTIONAL

## 2023-09-06 RX ORDER — HYDROMORPHONE HYDROCHLORIDE 1 MG/ML
0.5 INJECTION, SOLUTION INTRAMUSCULAR; INTRAVENOUS; SUBCUTANEOUS EVERY 5 MIN PRN
Status: DISCONTINUED | OUTPATIENT
Start: 2023-09-06 | End: 2023-09-06 | Stop reason: HOSPADM

## 2023-09-06 RX ORDER — HYDROMORPHONE HYDROCHLORIDE 1 MG/ML
0.25 INJECTION, SOLUTION INTRAMUSCULAR; INTRAVENOUS; SUBCUTANEOUS EVERY 5 MIN PRN
Status: DISCONTINUED | OUTPATIENT
Start: 2023-09-06 | End: 2023-09-06 | Stop reason: HOSPADM

## 2023-09-06 RX ORDER — ONDANSETRON 2 MG/ML
4 INJECTION INTRAMUSCULAR; INTRAVENOUS EVERY 6 HOURS PRN
Status: DISCONTINUED | OUTPATIENT
Start: 2023-09-06 | End: 2023-09-08

## 2023-09-06 RX ORDER — LIDOCAINE HYDROCHLORIDE 20 MG/ML
INJECTION, SOLUTION INTRAVENOUS PRN
Status: DISCONTINUED | OUTPATIENT
Start: 2023-09-06 | End: 2023-09-06 | Stop reason: SDUPTHER

## 2023-09-06 RX ORDER — FERROUS SULFATE 325(65) MG
325 TABLET ORAL
Status: DISCONTINUED | OUTPATIENT
Start: 2023-09-06 | End: 2023-09-08 | Stop reason: SDUPTHER

## 2023-09-06 RX ORDER — ALBUTEROL SULFATE 90 UG/1
2 AEROSOL, METERED RESPIRATORY (INHALATION) EVERY 6 HOURS PRN
Status: DISCONTINUED | OUTPATIENT
Start: 2023-09-06 | End: 2023-09-06 | Stop reason: CLARIF

## 2023-09-06 RX ORDER — SPIRONOLACTONE 25 MG/1
12.5 TABLET ORAL DAILY
Status: DISCONTINUED | OUTPATIENT
Start: 2023-09-06 | End: 2023-09-13

## 2023-09-06 RX ORDER — SODIUM CHLORIDE 9 MG/ML
INJECTION, SOLUTION INTRAVENOUS PRN
Status: DISCONTINUED | OUTPATIENT
Start: 2023-09-06 | End: 2023-09-06 | Stop reason: HOSPADM

## 2023-09-06 RX ORDER — FENTANYL CITRATE 50 UG/ML
INJECTION, SOLUTION INTRAMUSCULAR; INTRAVENOUS PRN
Status: DISCONTINUED | OUTPATIENT
Start: 2023-09-06 | End: 2023-09-06 | Stop reason: SDUPTHER

## 2023-09-06 RX ORDER — MORPHINE SULFATE 2 MG/ML
2 INJECTION, SOLUTION INTRAMUSCULAR; INTRAVENOUS
Status: DISCONTINUED | OUTPATIENT
Start: 2023-09-06 | End: 2023-09-07

## 2023-09-06 RX ORDER — METOPROLOL SUCCINATE 50 MG/1
25 TABLET, EXTENDED RELEASE ORAL DAILY
Status: DISCONTINUED | OUTPATIENT
Start: 2023-09-06 | End: 2023-09-07

## 2023-09-06 RX ORDER — MORPHINE SULFATE 2 MG/ML
2 INJECTION, SOLUTION INTRAMUSCULAR; INTRAVENOUS EVERY 4 HOURS PRN
Status: DISCONTINUED | OUTPATIENT
Start: 2023-09-06 | End: 2023-09-06

## 2023-09-06 RX ORDER — ONDANSETRON 2 MG/ML
4 INJECTION INTRAMUSCULAR; INTRAVENOUS
Status: DISCONTINUED | OUTPATIENT
Start: 2023-09-06 | End: 2023-09-06 | Stop reason: HOSPADM

## 2023-09-06 RX ORDER — OXYCODONE HYDROCHLORIDE AND ACETAMINOPHEN 5; 325 MG/1; MG/1
1 TABLET ORAL EVERY 6 HOURS PRN
Qty: 28 TABLET | Refills: 0 | Status: SHIPPED | OUTPATIENT
Start: 2023-09-06 | End: 2023-09-13

## 2023-09-06 RX ORDER — BUMETANIDE 1 MG/1
1 TABLET ORAL DAILY
Status: DISCONTINUED | OUTPATIENT
Start: 2023-09-06 | End: 2023-09-11

## 2023-09-06 RX ORDER — PROPOFOL 10 MG/ML
INJECTION, EMULSION INTRAVENOUS PRN
Status: DISCONTINUED | OUTPATIENT
Start: 2023-09-06 | End: 2023-09-06 | Stop reason: SDUPTHER

## 2023-09-06 RX ORDER — OXYCODONE HYDROCHLORIDE 5 MG/1
5 TABLET ORAL EVERY 4 HOURS PRN
Status: DISCONTINUED | OUTPATIENT
Start: 2023-09-06 | End: 2023-09-14 | Stop reason: HOSPADM

## 2023-09-06 RX ORDER — BISACODYL 5 MG/1
5 TABLET, DELAYED RELEASE ORAL DAILY PRN
Status: DISCONTINUED | OUTPATIENT
Start: 2023-09-06 | End: 2023-09-14 | Stop reason: HOSPADM

## 2023-09-06 RX ORDER — ALBUTEROL SULFATE 2.5 MG/3ML
2.5 SOLUTION RESPIRATORY (INHALATION) EVERY 6 HOURS PRN
Status: DISCONTINUED | OUTPATIENT
Start: 2023-09-06 | End: 2023-09-14 | Stop reason: HOSPADM

## 2023-09-06 RX ORDER — SODIUM CHLORIDE 9 MG/ML
INJECTION, SOLUTION INTRAVENOUS CONTINUOUS
Status: DISCONTINUED | OUTPATIENT
Start: 2023-09-06 | End: 2023-09-07

## 2023-09-06 RX ORDER — SODIUM CHLORIDE 0.9 % (FLUSH) 0.9 %
5-40 SYRINGE (ML) INJECTION EVERY 12 HOURS SCHEDULED
Status: DISCONTINUED | OUTPATIENT
Start: 2023-09-06 | End: 2023-09-06 | Stop reason: HOSPADM

## 2023-09-06 RX ORDER — OXYCODONE HYDROCHLORIDE 10 MG/1
10 TABLET ORAL EVERY 4 HOURS PRN
Status: DISCONTINUED | OUTPATIENT
Start: 2023-09-06 | End: 2023-09-14 | Stop reason: HOSPADM

## 2023-09-06 RX ORDER — ACETAMINOPHEN 325 MG/1
650 TABLET ORAL EVERY 4 HOURS PRN
Status: DISCONTINUED | OUTPATIENT
Start: 2023-09-06 | End: 2023-09-07

## 2023-09-06 RX ORDER — DEXAMETHASONE SODIUM PHOSPHATE 10 MG/ML
INJECTION INTRAMUSCULAR; INTRAVENOUS PRN
Status: DISCONTINUED | OUTPATIENT
Start: 2023-09-06 | End: 2023-09-06 | Stop reason: SDUPTHER

## 2023-09-06 RX ORDER — ARFORMOTEROL TARTRATE 15 UG/2ML
15 SOLUTION RESPIRATORY (INHALATION)
Status: DISCONTINUED | OUTPATIENT
Start: 2023-09-06 | End: 2023-09-14 | Stop reason: HOSPADM

## 2023-09-06 RX ORDER — SODIUM CHLORIDE 9 MG/ML
INJECTION, SOLUTION INTRAVENOUS PRN
Status: DISCONTINUED | OUTPATIENT
Start: 2023-09-06 | End: 2023-09-14 | Stop reason: HOSPADM

## 2023-09-06 RX ORDER — POTASSIUM CHLORIDE 20 MEQ/1
20 TABLET, EXTENDED RELEASE ORAL DAILY
Status: DISCONTINUED | OUTPATIENT
Start: 2023-09-06 | End: 2023-09-07

## 2023-09-06 RX ORDER — ROSUVASTATIN CALCIUM 10 MG/1
10 TABLET, COATED ORAL DAILY
Status: DISCONTINUED | OUTPATIENT
Start: 2023-09-06 | End: 2023-09-14 | Stop reason: HOSPADM

## 2023-09-06 RX ORDER — SODIUM CHLORIDE 0.9 % (FLUSH) 0.9 %
5-40 SYRINGE (ML) INJECTION PRN
Status: DISCONTINUED | OUTPATIENT
Start: 2023-09-06 | End: 2023-09-06 | Stop reason: HOSPADM

## 2023-09-06 RX ORDER — LISINOPRIL 10 MG/1
10 TABLET ORAL DAILY
Status: DISCONTINUED | OUTPATIENT
Start: 2023-09-06 | End: 2023-09-13

## 2023-09-06 RX ORDER — SODIUM CHLORIDE, SODIUM LACTATE, POTASSIUM CHLORIDE, CALCIUM CHLORIDE 600; 310; 30; 20 MG/100ML; MG/100ML; MG/100ML; MG/100ML
INJECTION, SOLUTION INTRAVENOUS CONTINUOUS PRN
Status: DISCONTINUED | OUTPATIENT
Start: 2023-09-06 | End: 2023-09-06 | Stop reason: SDUPTHER

## 2023-09-06 RX ORDER — MORPHINE SULFATE 4 MG/ML
4 INJECTION, SOLUTION INTRAMUSCULAR; INTRAVENOUS
Status: DISCONTINUED | OUTPATIENT
Start: 2023-09-06 | End: 2023-09-07

## 2023-09-06 RX ORDER — SODIUM CHLORIDE 0.9 % (FLUSH) 0.9 %
5-40 SYRINGE (ML) INJECTION EVERY 12 HOURS SCHEDULED
Status: DISCONTINUED | OUTPATIENT
Start: 2023-09-06 | End: 2023-09-14 | Stop reason: HOSPADM

## 2023-09-06 RX ORDER — SODIUM CHLORIDE 0.9 % (FLUSH) 0.9 %
5-40 SYRINGE (ML) INJECTION PRN
Status: DISCONTINUED | OUTPATIENT
Start: 2023-09-06 | End: 2023-09-14 | Stop reason: HOSPADM

## 2023-09-06 RX ORDER — VASOPRESSIN 20 U/ML
INJECTION PARENTERAL PRN
Status: DISCONTINUED | OUTPATIENT
Start: 2023-09-06 | End: 2023-09-06 | Stop reason: SDUPTHER

## 2023-09-06 RX ORDER — ROCURONIUM BROMIDE 10 MG/ML
INJECTION, SOLUTION INTRAVENOUS PRN
Status: DISCONTINUED | OUTPATIENT
Start: 2023-09-06 | End: 2023-09-06 | Stop reason: SDUPTHER

## 2023-09-06 RX ORDER — ONDANSETRON 4 MG/1
4 TABLET, ORALLY DISINTEGRATING ORAL EVERY 8 HOURS PRN
Status: DISCONTINUED | OUTPATIENT
Start: 2023-09-06 | End: 2023-09-08

## 2023-09-06 RX ORDER — ONDANSETRON 2 MG/ML
INJECTION INTRAMUSCULAR; INTRAVENOUS PRN
Status: DISCONTINUED | OUTPATIENT
Start: 2023-09-06 | End: 2023-09-06 | Stop reason: SDUPTHER

## 2023-09-06 RX ADMIN — ONDANSETRON 4 MG: 2 INJECTION INTRAMUSCULAR; INTRAVENOUS at 17:42

## 2023-09-06 RX ADMIN — ROCURONIUM BROMIDE 10 MG: 10 INJECTION, SOLUTION INTRAVENOUS at 16:49

## 2023-09-06 RX ADMIN — CEFAZOLIN 2000 MG: 2 INJECTION, POWDER, FOR SOLUTION INTRAMUSCULAR; INTRAVENOUS at 16:59

## 2023-09-06 RX ADMIN — PHENYLEPHRINE HYDROCHLORIDE 200 MCG: 10 INJECTION INTRAVENOUS at 17:30

## 2023-09-06 RX ADMIN — MORPHINE SULFATE 2 MG: 2 INJECTION, SOLUTION INTRAMUSCULAR; INTRAVENOUS at 00:25

## 2023-09-06 RX ADMIN — SODIUM CHLORIDE, POTASSIUM CHLORIDE, SODIUM LACTATE AND CALCIUM CHLORIDE: 600; 310; 30; 20 INJECTION, SOLUTION INTRAVENOUS at 16:46

## 2023-09-06 RX ADMIN — IPRATROPIUM BROMIDE 0.5 MG: 0.5 SOLUTION RESPIRATORY (INHALATION) at 09:29

## 2023-09-06 RX ADMIN — CEFAZOLIN 2000 MG: 2 INJECTION, POWDER, FOR SOLUTION INTRAMUSCULAR; INTRAVENOUS at 01:54

## 2023-09-06 RX ADMIN — DEXAMETHASONE SODIUM PHOSPHATE 10 MG: 10 INJECTION INTRAMUSCULAR; INTRAVENOUS at 17:00

## 2023-09-06 RX ADMIN — Medication 10 ML: at 20:46

## 2023-09-06 RX ADMIN — ROCURONIUM BROMIDE 30 MG: 10 INJECTION, SOLUTION INTRAVENOUS at 17:00

## 2023-09-06 RX ADMIN — IPRATROPIUM BROMIDE 0.5 MG: 0.5 SOLUTION RESPIRATORY (INHALATION) at 11:58

## 2023-09-06 RX ADMIN — ARFORMOTEROL TARTRATE 15 MCG: 15 SOLUTION RESPIRATORY (INHALATION) at 09:29

## 2023-09-06 RX ADMIN — TRANEXAMIC ACID 1000 MG: 100 INJECTION, SOLUTION INTRAVENOUS at 17:03

## 2023-09-06 RX ADMIN — SUGAMMADEX 200 MG: 100 INJECTION, SOLUTION INTRAVENOUS at 18:04

## 2023-09-06 RX ADMIN — PHENYLEPHRINE HYDROCHLORIDE 100 MCG: 10 INJECTION INTRAVENOUS at 16:54

## 2023-09-06 RX ADMIN — MORPHINE SULFATE 2 MG: 2 INJECTION, SOLUTION INTRAMUSCULAR; INTRAVENOUS at 04:01

## 2023-09-06 RX ADMIN — VASOPRESSIN 2 UNITS: 20 INJECTION INTRAVENOUS at 17:56

## 2023-09-06 RX ADMIN — LIDOCAINE HYDROCHLORIDE 50 MG: 20 INJECTION, SOLUTION INTRAVENOUS at 16:49

## 2023-09-06 RX ADMIN — PHENYLEPHRINE HYDROCHLORIDE 100 MCG: 10 INJECTION INTRAVENOUS at 16:50

## 2023-09-06 RX ADMIN — SODIUM CHLORIDE: 9 INJECTION, SOLUTION INTRAVENOUS at 20:47

## 2023-09-06 RX ADMIN — FENTANYL CITRATE 100 MCG: 0.05 INJECTION, SOLUTION INTRAMUSCULAR; INTRAVENOUS at 16:49

## 2023-09-06 RX ADMIN — VASOPRESSIN 3 UNITS: 20 INJECTION INTRAVENOUS at 18:21

## 2023-09-06 RX ADMIN — PHENYLEPHRINE HYDROCHLORIDE 100 MCG: 10 INJECTION INTRAVENOUS at 16:56

## 2023-09-06 RX ADMIN — PROPOFOL 110 MG: 10 INJECTION, EMULSION INTRAVENOUS at 16:49

## 2023-09-06 RX ADMIN — PHENYLEPHRINE HYDROCHLORIDE 200 MCG: 10 INJECTION INTRAVENOUS at 17:37

## 2023-09-06 RX ADMIN — VASOPRESSIN 5 UNITS: 20 INJECTION INTRAVENOUS at 18:02

## 2023-09-06 RX ADMIN — VASOPRESSIN 2 UNITS: 20 INJECTION INTRAVENOUS at 18:06

## 2023-09-06 RX ADMIN — METOPROLOL SUCCINATE 25 MG: 25 TABLET, FILM COATED, EXTENDED RELEASE ORAL at 08:53

## 2023-09-06 ASSESSMENT — PAIN DESCRIPTION - ORIENTATION: ORIENTATION: RIGHT

## 2023-09-06 ASSESSMENT — PAIN DESCRIPTION - ONSET: ONSET: ON-GOING

## 2023-09-06 ASSESSMENT — PAIN DESCRIPTION - LOCATION
LOCATION: HIP

## 2023-09-06 ASSESSMENT — PAIN SCALES - GENERAL
PAINLEVEL_OUTOF10: 0
PAINLEVEL_OUTOF10: 9
PAINLEVEL_OUTOF10: 6
PAINLEVEL_OUTOF10: 10
PAINLEVEL_OUTOF10: 0

## 2023-09-06 ASSESSMENT — PAIN DESCRIPTION - FREQUENCY: FREQUENCY: CONTINUOUS

## 2023-09-06 ASSESSMENT — PAIN DESCRIPTION - PAIN TYPE: TYPE: ACUTE PAIN

## 2023-09-06 ASSESSMENT — ENCOUNTER SYMPTOMS
ABDOMINAL PAIN: 0
SHORTNESS OF BREATH: 0
SHORTNESS OF BREATH: 1

## 2023-09-06 ASSESSMENT — PAIN DESCRIPTION - DESCRIPTORS: DESCRIPTORS: ACHING;SHARP;SORE

## 2023-09-06 NOTE — PROGRESS NOTES
Spoke with the patient at bedside. I explained the procedure, answered any questions she had, and explained risks and benefits associated with the procedure. After discussion, patient is agreeable to surgery. We will continue to try and reach out to her family members who live in [place], however we will not be delaying surgery any further to try get a hold of them and the patient should be sent down to the OR.   She agrees with this plan

## 2023-09-06 NOTE — ANESTHESIA POSTPROCEDURE EVALUATION
Department of Anesthesiology  Postprocedure Note    Patient: Xi Tobias  MRN: [id number]  [facility] 1940  Date of evaluation: 9/6/2023      Procedure Summary     Date: 09/06/23 Room / Location: [facility] OR 09 / [facility]    Anesthesia Start: 3779 Anesthesia Stop: 1858    Procedure: right hip open reduction internal fixation with cephalomedullary nailing (Right: Hip) Diagnosis:       Closed displaced intertrochanteric fracture of right femur, initial encounter ([address])      (Closed displaced intertrochanteric fracture of right femur, initial encounter ([address]) [F05.367L])    Surgeons: Chip Rob DO Responsible Provider: Yesi Ray MD    Anesthesia Type: General ASA Status: 3          Anesthesia Type: General    Jason Phase I:      Jason Phase II:        Anesthesia Post Evaluation    Patient location during evaluation: PACU  Patient participation: complete - patient participated  Level of consciousness: awake  Pain score: 3  Airway patency: patent  Nausea & Vomiting: no nausea  Complications: no  Cardiovascular status: hemodynamically stable  Respiratory status: acceptable  Hydration status: stable  Multimodal analgesia pain management approach  Pain management: adequate
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
## 2023-09-06 NOTE — PROGRESS NOTES
Physical Therapy    Date: 2023       Patient Name: Claire Garvin  :       MRN: [id number]    PT order received and chart reviewed. PT evaluation held pending ORIF today.      Claude Shove PT, DPT  [id number]

## 2023-09-06 NOTE — OP NOTE
Operative Note      Patient: Aj [facility]  YOB: 1940  MRN: [id number]    Date of Procedure: 9/6/2023    Pre-Op Diagnosis Codes:     * Closed displaced intertrochanteric fracture of right femur, initial encounter ([address] [place]    Post-Op Diagnosis: Same       Procedure(s):  right hip open reduction internal fixation with cephalomedullary nailing    Surgeon(s):  Kenna Noble DO    Assistant:   Resident: Jackie Barnes DO; Alecia Aguirre DO; Sacha Sarabia DO    Anesthesia: General    Estimated Blood Loss (mL): 57GS    Complications: None        Implants:  Implant Name Type Inv. Item Serial No.  Lot No. LRB No. Used Action   NAIL IM L340MM [id number] 125DEG LNG R PROX FEM GRN TI SHALONDA - [id number]  NAIL IM L340MM [id number] 125DEG LNG R PROX FEM GRN TI SHALONDA  DEPU[employer] [id number] Right 1 Implanted   SCREW BNE L100MM DIA10.35MM G TI SHALONDA PERF FOR PROX FEM - [id number]  SCREW BNE L100MM DIA10.35MM G TI SHALONDA PERF FOR PROX FEM  DEPU[employer] [id number] Right 1 Implanted   SCREW BNE L34MM DIA5MM TIB LT GRN TI ST SHALONDA SHUN FULL THRD - [id number]  SCREW BNE L34MM DIA5MM TIB LT GRN TI ST SHALONDA SHUN FULL THRD  DE[employer] [id number] Right 1 Implanted   SCREW BNE L38MM DIA5MM TIB LT GRN TI ST SHALONDA SHUN FULL THRD - [id number]  SCREW BNE L38MM DIA5MM TIB LT GRN TI ST SHALONDA SHUN FULL THRD  DEPU[employer] [id number] Right 1 Implanted         Drains:   External Urinary Catheter (Active)   Placement Initiated 09/06/23 0518   Catheter Care Catheter/Wick replaced;Suction Canister/Tubing changed 09/06/23 0518   Perineal Care Yes 09/06/23 0518   Suction 40 mmgHg continuous 09/06/23 0518   Urine Color Yellow 09/06/23 0518   Urine Appearance Clear 09/06/23 0518   Output (mL) 100 mL 09/06/23 0518           HISTORY: The patient is a 80y.o. year old female with history of above preop diagnosis. I explained the risk, benefits, expected outcome, and alternatives to the procedure.   Patient understands and is in agreement. OPERATIVE COURSE: The patient was seen and identified outside the operative suite, in which the operative site was marked as appropriate by patient, surgeon, staff, and anesthesia. After intubation, patient was placed on the fracture table. All bony prominences and neurovascular structures were well padded and protexted. Patient's well leg was placed into the contralateral boot and ski position was maintained. The operative leg was placed in gentle inline traction,. After being positioned, a time out was performed indicating the appropriate identification of the patient, the procedure to be performed, and the side to be performed upon. This was agreed upon by all individuals in the room. Traction and reduction maneuver were applied to the operative leg. Fluoroscopy was brought in showing a near-anatomic reduction of the intertrochanteric hip fracture. The affected lower extremity was then sterilely prepped and draped in a standard fashion. At this point in time, a small incision proximal to the greater trochanter was used with proper hemostasis, and the guide pin for the cephalomedullary nail was then inserted under fluoroscopic guidance in the appropriate position in the proximal femur. This was checked under both the AP and lateral.     After this was deemed to be in the appropriate position. The entry reamer was then used to access the proximal femur making sure not to disrupt the abductors. A ball-tip guidewire was placed down to the knee. X-rays were taken to confirm placement down to the knee. Then the length of the nail was measured and a 340-mm nail was chosen. Sequential reaming was carried out up to a 13.5-mm reamer; therefore, an 12-mm diameter nail was chosen. The nail was then placed into position under fluoroscopic guidance. The trochar for the lag screw was then placed. An incision was made laterally to allow access with the jig.  The guidewire was then

## 2023-09-06 NOTE — PROGRESS NOTES
Occupational Therapy          OT consult received and appreciated. Chart reviewed. Will hold evaluation due to Patient scheduled for R hip ORIF today . Will evaluate at a later time post op as indicated. Thank you. Maryam Dailey.  [address], [address]

## 2023-09-06 NOTE — CARE COORDINATION
9/6. The pt  lives alone and is independent. She is for surgery today. Will need PT/OT to assist with discharge plan.  Arthur Gonzalez RN

## 2023-09-06 NOTE — PROGRESS NOTES
Physician Progress Note      PATIENT:               Shana Davis  CSN #:                  [id number]  :                       1940  ADMIT DATE:       2023 4:04 PM  [address] [facility] DATE:  RESPONDING  PROVIDER #:        Sharon Teixeira MD          QUERY TEXT:    Pt admitted with Right hip fracture, noted to have supplemental O2 at   baseline. If possible, please document in the progress notes and discharge   summary if you are evaluating and/or treating any of the following: The medical record reflects the following:  Risk Factors: A-fib, advanced age  Clinical Indicators: pt admitted with Right hip fracture  - noted to be on 2L O2 at baseline  - ED provider notes chronic respiratory failure  Treatment: O2 support, monitoring    Thank you,    Flor Nye RN  CDI  Options provided:  -- Chronic respiratory failure with hypoxia  -- Chronic respiratory failure with hypercapnia  -- Chronic respiratory failure with hypoxia and hypercapnia  -- Other - I will add my own diagnosis  -- Disagree - Not applicable / Not valid  -- Disagree - Clinically unable to determine / Unknown  -- Refer to Clinical Documentation Reviewer    PROVIDER RESPONSE TEXT:    This patient has chronic respiratory failure with hypoxia.     Query created by: Flor Nye on 2023 11:47 AM      Electronically signed by:  Sharon Teixeira MD 2023 11:51 AM

## 2023-09-06 NOTE — PROGRESS NOTES
Physician Progress Note      PATIENT:               Alex Cardenas  CSN #:                  [id number]  :                       1940  ADMIT DATE:       2023 4:04 PM  [address] [facility] DATE:  RESPONDING  PROVIDER #:        Evan Machado MD          QUERY TEXT:    Patient admitted with closed Right hip fracture, noted to have h/o atrial   fibrillation. If possible, please document in progress notes and discharge   summary further specificity regarding the type of atrial fibrillation: The medical record reflects the following:  Risk Factors: advance age, heart failure  Clinical Indicators:  Ortho notes:  Reports taking Eliquis secondary to   A-fib.  - H&P notes: EKG Afib which is not new. -  EKG with A-fib and  EKG with A-fib  Treatment: EKG, monitoring    Thank you,    Key Ghosh RN  CDI  Options provided:  -- Paroxysmal Atrial Fibrillation  -- Longstanding Persistent Atrial Fibrillation  -- Permanent Atrial Fibrillation  -- Persistent Atrial Fibrillation  -- Chronic Atrial Fibrillation, unspecified  -- Other - I will add my own diagnosis  -- Disagree - Not applicable / Not valid  -- Disagree - Clinically unable to determine / Unknown  -- Refer to Clinical Documentation Reviewer    PROVIDER RESPONSE TEXT:    This patient has paroxysmal atrial fibrillation.     Query created by: Key Ghosh on 2023 11:39 AM      Electronically signed by:  Evan Machado MD 2023 11:46 AM
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
## 2023-09-06 NOTE — ED NOTES
Report to Deshawn Clement RN patient ready to to got floor at this time     Niecy Burris  09/06/23 7222

## 2023-09-07 ENCOUNTER — APPOINTMENT (OUTPATIENT)
Dept: GENERAL RADIOLOGY | Age: 83
End: 2023-09-07
Payer: MEDICARE

## 2023-09-07 LAB
AADO2: 165.5 MMHG
AADO2: 222.6 MMHG
ALBUMIN SERPL-MCNC: 3.1 G/DL (ref 3.5–5.2)
ALBUMIN SERPL-MCNC: 3.3 G/DL (ref 3.5–5.2)
ALBUMIN SERPL-MCNC: 3.3 G/DL (ref 3.5–5.2)
ALBUMIN SERPL-MCNC: 3.4 G/DL (ref 3.5–5.2)
ALP SERPL-CCNC: 65 U/L (ref 35–104)
ALP SERPL-CCNC: 78 U/L (ref 35–104)
ALP SERPL-CCNC: 81 U/L (ref 35–104)
ALP SERPL-CCNC: 85 U/L (ref 35–104)
ALT SERPL-CCNC: 10 U/L (ref 0–32)
ALT SERPL-CCNC: 103 U/L (ref 0–32)
ALT SERPL-CCNC: 235 U/L (ref 0–32)
ALT SERPL-CCNC: 439 U/L (ref 0–32)
ANION GAP SERPL CALCULATED.3IONS-SCNC: 14 MMOL/L (ref 7–16)
ANION GAP SERPL CALCULATED.3IONS-SCNC: 20 MMOL/L (ref 7–16)
ANION GAP SERPL CALCULATED.3IONS-SCNC: 22 MMOL/L (ref 7–16)
ANION GAP SERPL CALCULATED.3IONS-SCNC: 24 MMOL/L (ref 7–16)
AST SERPL-CCNC: 166 U/L (ref 0–31)
AST SERPL-CCNC: 30 U/L (ref 0–31)
AST SERPL-CCNC: 403 U/L (ref 0–31)
AST SERPL-CCNC: 822 U/L (ref 0–31)
B PARAP IS1001 DNA NPH QL NAA+NON-PROBE: NOT DETECTED
B PERT DNA SPEC QL NAA+PROBE: NOT DETECTED
B.E.: -6.1 MMOL/L (ref -3–3)
B.E.: -7.7 MMOL/L (ref -3–3)
B.E.: -7.9 MMOL/L (ref -3–3)
BACTERIA URNS QL MICRO: ABNORMAL
BASOPHILS # BLD: 0.01 K/UL (ref 0–0.2)
BASOPHILS NFR BLD: 0 % (ref 0–2)
BILIRUB SERPL-MCNC: 0.6 MG/DL (ref 0–1.2)
BILIRUB SERPL-MCNC: 1.2 MG/DL (ref 0–1.2)
BILIRUB SERPL-MCNC: 1.4 MG/DL (ref 0–1.2)
BILIRUB SERPL-MCNC: 1.8 MG/DL (ref 0–1.2)
BILIRUB UR QL STRIP: NEGATIVE
BNP SERPL-MCNC: ABNORMAL PG/ML (ref 0–450)
BUN SERPL-MCNC: 40 MG/DL (ref 6–23)
BUN SERPL-MCNC: 44 MG/DL (ref 6–23)
BUN SERPL-MCNC: 44 MG/DL (ref 6–23)
BUN SERPL-MCNC: 46 MG/DL (ref 6–23)
C PNEUM DNA NPH QL NAA+NON-PROBE: NOT DETECTED
CALCIUM SERPL-MCNC: 10.1 MG/DL (ref 8.6–10.2)
CALCIUM SERPL-MCNC: 8.8 MG/DL (ref 8.6–10.2)
CALCIUM SERPL-MCNC: 8.8 MG/DL (ref 8.6–10.2)
CALCIUM SERPL-MCNC: 9.2 MG/DL (ref 8.6–10.2)
CHLORIDE SERPL-SCNC: 100 MMOL/L (ref 98–107)
CHLORIDE SERPL-SCNC: 100 MMOL/L (ref 98–107)
CHLORIDE SERPL-SCNC: 101 MMOL/L (ref 98–107)
CHLORIDE SERPL-SCNC: 98 MMOL/L (ref 98–107)
CK SERPL-CCNC: 201 U/L (ref 20–180)
CLARITY UR: CLEAR
CO2 SERPL-SCNC: 15 MMOL/L (ref 22–29)
CO2 SERPL-SCNC: 15 MMOL/L (ref 22–29)
CO2 SERPL-SCNC: 16 MMOL/L (ref 22–29)
CO2 SERPL-SCNC: 25 MMOL/L (ref 22–29)
COHB: 0.9 % (ref 0–1.5)
COHB: 1 % (ref 0–1.5)
COHB: 1.2 % (ref 0–1.5)
COLOR UR: YELLOW
CORTIS SERPL-MCNC: 29.4 UG/DL (ref 2.7–18.4)
CORTISOL COLLECTION INFO: ABNORMAL
CREAT SERPL-MCNC: 1.4 MG/DL (ref 0.5–1)
CREAT SERPL-MCNC: 1.5 MG/DL (ref 0.5–1)
CREAT SERPL-MCNC: 1.6 MG/DL (ref 0.5–1)
CREAT SERPL-MCNC: 1.7 MG/DL (ref 0.5–1)
CRITICAL: ABNORMAL
DATE ANALYZED: ABNORMAL
DATE OF COLLECTION: ABNORMAL
EOSINOPHIL # BLD: 0 K/UL (ref 0.05–0.5)
EOSINOPHILS RELATIVE PERCENT: 0 % (ref 0–6)
ERYTHROCYTE [DISTWIDTH] IN BLOOD BY AUTOMATED COUNT: 14.4 % (ref 11.5–15)
FIO2: 50 %
FIO2: 50 %
FLUAV RNA NPH QL NAA+NON-PROBE: NOT DETECTED
FLUBV RNA NPH QL NAA+NON-PROBE: NOT DETECTED
GFR SERPL CREATININE-BSD FRML MDRD: 30 ML/MIN/1.73M2
GFR SERPL CREATININE-BSD FRML MDRD: 31 ML/MIN/1.73M2
GFR SERPL CREATININE-BSD FRML MDRD: 34 ML/MIN/1.73M2
GFR SERPL CREATININE-BSD FRML MDRD: 38 ML/MIN/1.73M2
GLUCOSE BLD-MCNC: 130 MG/DL (ref 74–99)
GLUCOSE BLD-MCNC: 143 MG/DL (ref 74–99)
GLUCOSE SERPL-MCNC: 124 MG/DL (ref 74–99)
GLUCOSE SERPL-MCNC: 141 MG/DL (ref 74–99)
GLUCOSE SERPL-MCNC: 154 MG/DL (ref 74–99)
GLUCOSE SERPL-MCNC: 154 MG/DL (ref 74–99)
GLUCOSE UR STRIP-MCNC: NEGATIVE MG/DL
HADV DNA NPH QL NAA+NON-PROBE: NOT DETECTED
HCO3: 17.1 MMOL/L (ref 22–26)
HCO3: 17.3 MMOL/L (ref 22–26)
HCO3: 20.1 MMOL/L (ref 22–26)
HCOV 229E RNA NPH QL NAA+NON-PROBE: NOT DETECTED
HCOV HKU1 RNA NPH QL NAA+NON-PROBE: NOT DETECTED
HCOV NL63 RNA NPH QL NAA+NON-PROBE: NOT DETECTED
HCOV OC43 RNA NPH QL NAA+NON-PROBE: NOT DETECTED
HCT VFR BLD AUTO: 38.3 % (ref 34–48)
HGB BLD-MCNC: 11.6 G/DL (ref 11.5–15.5)
HGB UR QL STRIP.AUTO: NEGATIVE
HHB: 1 % (ref 0–5)
HHB: 1.9 % (ref 0–5)
HHB: 4.8 % (ref 0–5)
HMPV RNA NPH QL NAA+NON-PROBE: NOT DETECTED
HPIV1 RNA NPH QL NAA+NON-PROBE: NOT DETECTED
HPIV2 RNA NPH QL NAA+NON-PROBE: NOT DETECTED
HPIV3 RNA NPH QL NAA+NON-PROBE: NOT DETECTED
HPIV4 RNA NPH QL NAA+NON-PROBE: NOT DETECTED
IMM GRANULOCYTES # BLD AUTO: 0.04 K/UL (ref 0–0.58)
IMM GRANULOCYTES NFR BLD: 1 % (ref 0–5)
INR PPP: 1.2
KETONES UR STRIP-MCNC: ABNORMAL MG/DL
LAB: ABNORMAL
LACTATE BLDV-SCNC: 3.3 MMOL/L (ref 0.5–2.2)
LEUKOCYTE ESTERASE UR QL STRIP: NEGATIVE
LYMPHOCYTES NFR BLD: 0.58 K/UL (ref 1.5–4)
LYMPHOCYTES RELATIVE PERCENT: 7 % (ref 20–42)
Lab: ABNORMAL
M PNEUMO DNA NPH QL NAA+NON-PROBE: NOT DETECTED
MAGNESIUM SERPL-MCNC: 2.2 MG/DL (ref 1.6–2.6)
MAGNESIUM SERPL-MCNC: 2.3 MG/DL (ref 1.6–2.6)
MCH RBC QN AUTO: 29.9 PG (ref 26–35)
MCHC RBC AUTO-ENTMCNC: 30.3 G/DL (ref 32–34.5)
MCV RBC AUTO: 98.7 FL (ref 80–99.9)
METHB: 0.2 % (ref 0–1.5)
METHB: 0.3 % (ref 0–1.5)
METHB: 0.3 % (ref 0–1.5)
MODE: ABNORMAL
MODE: AC
MODE: AC
MONOCYTES NFR BLD: 1.17 K/UL (ref 0.1–0.95)
MONOCYTES NFR BLD: 14 % (ref 2–12)
NEUTROPHILS NFR BLD: 79 % (ref 43–80)
NEUTS SEG NFR BLD: 6.58 K/UL (ref 1.8–7.3)
NITRITE UR QL STRIP: NEGATIVE
O2 CONTENT: 17.2 ML/DL
O2 CONTENT: 17.7 ML/DL
O2 CONTENT: 17.9 ML/DL
O2 SATURATION: 95.1 % (ref 92–98.5)
O2 SATURATION: 98.1 % (ref 92–98.5)
O2 SATURATION: 99 % (ref 92–98.5)
O2HB: 94 % (ref 94–97)
O2HB: 96.6 % (ref 94–97)
O2HB: 97.8 % (ref 94–97)
OPERATOR ID: 7490
PATIENT TEMP: 37 C
PCO2: 28.3 MMHG (ref 35–45)
PCO2: 33.4 MMHG (ref 35–45)
PCO2: 50.1 MMHG (ref 35–45)
PEEP/CPAP: 5 CMH2O
PEEP/CPAP: 5 CMH2O
PFO2: 1.68 MMHG/%
PFO2: 2.93 MMHG/%
PH BLOOD GAS: 7.22 (ref 7.35–7.45)
PH BLOOD GAS: 7.33 (ref 7.35–7.45)
PH BLOOD GAS: 7.4 (ref 7.35–7.45)
PH UR STRIP: 6.5 [PH] (ref 5–9)
PHOSPHATE SERPL-MCNC: 6.2 MG/DL (ref 2.5–4.5)
PHOSPHATE SERPL-MCNC: 6.4 MG/DL (ref 2.5–4.5)
PHOSPHATE SERPL-MCNC: 6.4 MG/DL (ref 2.5–4.5)
PHOSPHATE SERPL-MCNC: 6.7 MG/DL (ref 2.5–4.5)
PLATELET # BLD AUTO: 157 K/UL (ref 130–450)
PMV BLD AUTO: 11.1 FL (ref 7–12)
PO2: 129.5 MMHG (ref 75–100)
PO2: 146.7 MMHG (ref 75–100)
PO2: 83.8 MMHG (ref 75–100)
POTASSIUM SERPL-SCNC: 5.4 MMOL/L (ref 3.5–5)
POTASSIUM SERPL-SCNC: 5.5 MMOL/L (ref 3.5–5)
POTASSIUM SERPL-SCNC: 5.57 MMOL/L (ref 3.5–5)
POTASSIUM SERPL-SCNC: 5.6 MMOL/L (ref 3.5–5)
POTASSIUM SERPL-SCNC: 5.6 MMOL/L (ref 3.5–5)
PROCALCITONIN SERPL-MCNC: 0.5 NG/ML (ref 0–0.08)
PROT SERPL-MCNC: 5.6 G/DL (ref 6.4–8.3)
PROT SERPL-MCNC: 5.9 G/DL (ref 6.4–8.3)
PROT SERPL-MCNC: 6.1 G/DL (ref 6.4–8.3)
PROT SERPL-MCNC: 6.2 G/DL (ref 6.4–8.3)
PROT UR STRIP-MCNC: 30 MG/DL
PROTHROMBIN TIME: 12.7 SEC (ref 9.3–12.4)
RBC # BLD AUTO: 3.88 M/UL (ref 3.5–5.5)
RBC # BLD: ABNORMAL 10*6/UL
RBC #/AREA URNS HPF: ABNORMAL /HPF
RI(T): 1.13
RI(T): 2.66
RR MECHANICAL: 20 B/MIN
RR MECHANICAL: 20 B/MIN
RSV RNA NPH QL NAA+NON-PROBE: NOT DETECTED
RV+EV RNA NPH QL NAA+NON-PROBE: NOT DETECTED
SARS-COV-2 RNA NPH QL NAA+NON-PROBE: NOT DETECTED
SODIUM SERPL-SCNC: 135 MMOL/L (ref 132–146)
SODIUM SERPL-SCNC: 137 MMOL/L (ref 132–146)
SODIUM SERPL-SCNC: 139 MMOL/L (ref 132–146)
SODIUM SERPL-SCNC: 139 MMOL/L (ref 132–146)
SOURCE, BLOOD GAS: ABNORMAL
SP GR UR STRIP: 1.01 (ref 1–1.03)
SPECIMEN DESCRIPTION: NORMAL
THB: 12.8 G/DL (ref 11.5–16.5)
THB: 12.9 G/DL (ref 11.5–16.5)
THB: 13 G/DL (ref 11.5–16.5)
TIME ANALYZED: 1148
TIME ANALYZED: 1453
TIME ANALYZED: 1710
TROPONIN I SERPL HS-MCNC: 235 NG/L (ref 0–9)
TSH SERPL DL<=0.05 MIU/L-ACNC: 7.09 UIU/ML (ref 0.27–4.2)
UROBILINOGEN UR STRIP-ACNC: 0.2 EU/DL (ref 0–1)
VT MECHANICAL: 400 ML
VT MECHANICAL: 400 ML
WBC #/AREA URNS HPF: ABNORMAL /HPF
WBC OTHER # BLD: 8.4 K/UL (ref 4.5–11.5)

## 2023-09-07 PROCEDURE — 99291 CRITICAL CARE FIRST HOUR: CPT | Performed by: INTERNAL MEDICINE

## 2023-09-07 PROCEDURE — 6370000000 HC RX 637 (ALT 250 FOR IP): Performed by: NURSE PRACTITIONER

## 2023-09-07 PROCEDURE — 2500000003 HC RX 250 WO HCPCS: Performed by: NURSE PRACTITIONER

## 2023-09-07 PROCEDURE — 02HV33Z INSERTION OF INFUSION DEVICE INTO SUPERIOR VENA CAVA, PERCUTANEOUS APPROACH: ICD-10-PCS | Performed by: INTERNAL MEDICINE

## 2023-09-07 PROCEDURE — 84481 FREE ASSAY (FT-3): CPT

## 2023-09-07 PROCEDURE — 6370000000 HC RX 637 (ALT 250 FOR IP): Performed by: STUDENT IN AN ORGANIZED HEALTH CARE EDUCATION/TRAINING PROGRAM

## 2023-09-07 PROCEDURE — 84439 ASSAY OF FREE THYROXINE: CPT

## 2023-09-07 PROCEDURE — 6360000002 HC RX W HCPCS: Performed by: NURSE PRACTITIONER

## 2023-09-07 PROCEDURE — 93005 ELECTROCARDIOGRAM TRACING: CPT

## 2023-09-07 PROCEDURE — 84132 ASSAY OF SERUM POTASSIUM: CPT

## 2023-09-07 PROCEDURE — 84145 PROCALCITONIN (PCT): CPT

## 2023-09-07 PROCEDURE — 82550 ASSAY OF CK (CPK): CPT

## 2023-09-07 PROCEDURE — 85025 COMPLETE CBC W/AUTO DIFF WBC: CPT

## 2023-09-07 PROCEDURE — 87449 NOS EACH ORGANISM AG IA: CPT

## 2023-09-07 PROCEDURE — 2500000003 HC RX 250 WO HCPCS

## 2023-09-07 PROCEDURE — 85610 PROTHROMBIN TIME: CPT

## 2023-09-07 PROCEDURE — 84484 ASSAY OF TROPONIN QUANT: CPT

## 2023-09-07 PROCEDURE — 31500 INSERT EMERGENCY AIRWAY: CPT

## 2023-09-07 PROCEDURE — 36415 COLL VENOUS BLD VENIPUNCTURE: CPT

## 2023-09-07 PROCEDURE — 51702 INSERT TEMP BLADDER CATH: CPT

## 2023-09-07 PROCEDURE — 82962 GLUCOSE BLOOD TEST: CPT

## 2023-09-07 PROCEDURE — 80053 COMPREHEN METABOLIC PANEL: CPT

## 2023-09-07 PROCEDURE — 5A1955Z RESPIRATORY VENTILATION, GREATER THAN 96 CONSECUTIVE HOURS: ICD-10-PCS | Performed by: INTERNAL MEDICINE

## 2023-09-07 PROCEDURE — C9113 INJ PANTOPRAZOLE SODIUM, VIA: HCPCS | Performed by: INTERNAL MEDICINE

## 2023-09-07 PROCEDURE — 6360000002 HC RX W HCPCS: Performed by: STUDENT IN AN ORGANIZED HEALTH CARE EDUCATION/TRAINING PROGRAM

## 2023-09-07 PROCEDURE — 2000000000 HC ICU R&B

## 2023-09-07 PROCEDURE — 93005 ELECTROCARDIOGRAM TRACING: CPT | Performed by: NURSE PRACTITIONER

## 2023-09-07 PROCEDURE — 36620 INSERTION CATHETER ARTERY: CPT

## 2023-09-07 PROCEDURE — 81001 URINALYSIS AUTO W/SCOPE: CPT

## 2023-09-07 PROCEDURE — 2580000003 HC RX 258: Performed by: INTERNAL MEDICINE

## 2023-09-07 PROCEDURE — 0BH17EZ INSERTION OF ENDOTRACHEAL AIRWAY INTO TRACHEA, VIA NATURAL OR ARTIFICIAL OPENING: ICD-10-PCS | Performed by: INTERNAL MEDICINE

## 2023-09-07 PROCEDURE — 6360000002 HC RX W HCPCS

## 2023-09-07 PROCEDURE — 04HL33Z INSERTION OF INFUSION DEVICE INTO LEFT FEMORAL ARTERY, PERCUTANEOUS APPROACH: ICD-10-PCS | Performed by: INTERNAL MEDICINE

## 2023-09-07 PROCEDURE — 83880 ASSAY OF NATRIURETIC PEPTIDE: CPT

## 2023-09-07 PROCEDURE — 2580000003 HC RX 258: Performed by: STUDENT IN AN ORGANIZED HEALTH CARE EDUCATION/TRAINING PROGRAM

## 2023-09-07 PROCEDURE — 87086 URINE CULTURE/COLONY COUNT: CPT

## 2023-09-07 PROCEDURE — 2580000003 HC RX 258: Performed by: NURSE PRACTITIONER

## 2023-09-07 PROCEDURE — 82805 BLOOD GASES W/O2 SATURATION: CPT

## 2023-09-07 PROCEDURE — 84100 ASSAY OF PHOSPHORUS: CPT

## 2023-09-07 PROCEDURE — 5A12012 PERFORMANCE OF CARDIAC OUTPUT, SINGLE, MANUAL: ICD-10-PCS | Performed by: INTERNAL MEDICINE

## 2023-09-07 PROCEDURE — 94002 VENT MGMT INPAT INIT DAY: CPT

## 2023-09-07 PROCEDURE — 83735 ASSAY OF MAGNESIUM: CPT

## 2023-09-07 PROCEDURE — 0202U NFCT DS 22 TRGT SARS-COV-2: CPT

## 2023-09-07 PROCEDURE — 87040 BLOOD CULTURE FOR BACTERIA: CPT

## 2023-09-07 PROCEDURE — 84443 ASSAY THYROID STIM HORMONE: CPT

## 2023-09-07 PROCEDURE — 87081 CULTURE SCREEN ONLY: CPT

## 2023-09-07 PROCEDURE — 83605 ASSAY OF LACTIC ACID: CPT

## 2023-09-07 PROCEDURE — 2580000003 HC RX 258

## 2023-09-07 PROCEDURE — 2700000000 HC OXYGEN THERAPY PER DAY

## 2023-09-07 PROCEDURE — 82533 TOTAL CORTISOL: CPT

## 2023-09-07 PROCEDURE — A4216 STERILE WATER/SALINE, 10 ML: HCPCS | Performed by: INTERNAL MEDICINE

## 2023-09-07 PROCEDURE — 71045 X-RAY EXAM CHEST 1 VIEW: CPT

## 2023-09-07 PROCEDURE — 6360000002 HC RX W HCPCS: Performed by: INTERNAL MEDICINE

## 2023-09-07 PROCEDURE — 80048 BASIC METABOLIC PNL TOTAL CA: CPT

## 2023-09-07 PROCEDURE — 6370000000 HC RX 637 (ALT 250 FOR IP): Performed by: INTERNAL MEDICINE

## 2023-09-07 PROCEDURE — 87899 AGENT NOS ASSAY W/OPTIC: CPT

## 2023-09-07 PROCEDURE — 94640 AIRWAY INHALATION TREATMENT: CPT

## 2023-09-07 PROCEDURE — 36556 INSERT NON-TUNNEL CV CATH: CPT

## 2023-09-07 RX ORDER — MIDAZOLAM HYDROCHLORIDE 2 MG/2ML
2 INJECTION, SOLUTION INTRAMUSCULAR; INTRAVENOUS EVERY 4 HOURS PRN
Status: DISCONTINUED | OUTPATIENT
Start: 2023-09-07 | End: 2023-09-14 | Stop reason: HOSPADM

## 2023-09-07 RX ORDER — DEXTROSE MONOHYDRATE 100 MG/ML
INJECTION, SOLUTION INTRAVENOUS CONTINUOUS PRN
Status: DISCONTINUED | OUTPATIENT
Start: 2023-09-07 | End: 2023-09-14 | Stop reason: HOSPADM

## 2023-09-07 RX ORDER — MORPHINE SULFATE 2 MG/ML
2 INJECTION, SOLUTION INTRAMUSCULAR; INTRAVENOUS ONCE
Status: COMPLETED | OUTPATIENT
Start: 2023-09-07 | End: 2023-09-07

## 2023-09-07 RX ORDER — NOREPINEPHRINE BITARTRATE 0.06 MG/ML
1-100 INJECTION, SOLUTION INTRAVENOUS CONTINUOUS
Status: DISCONTINUED | OUTPATIENT
Start: 2023-09-07 | End: 2023-09-11

## 2023-09-07 RX ORDER — PHENYLEPHRINE HCL IN 0.9% NACL 1 MG/10 ML
SYRINGE (ML) INTRAVENOUS
Status: COMPLETED
Start: 2023-09-07 | End: 2023-09-07

## 2023-09-07 RX ORDER — LIDOCAINE HYDROCHLORIDE ANHYDROUS AND DEXTROSE MONOHYDRATE 5; 400 G/100ML; MG/100ML
1 INJECTION, SOLUTION INTRAVENOUS CONTINUOUS
Status: DISCONTINUED | OUTPATIENT
Start: 2023-09-07 | End: 2023-09-12

## 2023-09-07 RX ORDER — CALCIUM GLUCONATE 10 MG/ML
1000 INJECTION, SOLUTION INTRAVENOUS ONCE
Status: COMPLETED | OUTPATIENT
Start: 2023-09-07 | End: 2023-09-07

## 2023-09-07 RX ORDER — SODIUM CHLORIDE, SODIUM LACTATE, POTASSIUM CHLORIDE, AND CALCIUM CHLORIDE .6; .31; .03; .02 G/100ML; G/100ML; G/100ML; G/100ML
1000 INJECTION, SOLUTION INTRAVENOUS ONCE
Status: COMPLETED | OUTPATIENT
Start: 2023-09-07 | End: 2023-09-07

## 2023-09-07 RX ORDER — NOREPINEPHRINE BITARTRATE 0.06 MG/ML
INJECTION, SOLUTION INTRAVENOUS
Status: COMPLETED
Start: 2023-09-07 | End: 2023-09-07

## 2023-09-07 RX ORDER — FENTANYL CITRATE-0.9 % NACL/PF 10 MCG/ML
PLASTIC BAG, INJECTION (ML) INTRAVENOUS
Status: COMPLETED
Start: 2023-09-07 | End: 2023-09-07

## 2023-09-07 RX ORDER — VASOPRESSIN 20 U/ML
INJECTION PARENTERAL
Status: COMPLETED
Start: 2023-09-07 | End: 2023-09-07

## 2023-09-07 RX ORDER — CHLORHEXIDINE GLUCONATE ORAL RINSE 1.2 MG/ML
15 SOLUTION DENTAL 2 TIMES DAILY
Status: DISCONTINUED | OUTPATIENT
Start: 2023-09-07 | End: 2023-09-14 | Stop reason: HOSPADM

## 2023-09-07 RX ORDER — ROCURONIUM BROMIDE 10 MG/ML
INJECTION, SOLUTION INTRAVENOUS
Status: COMPLETED
Start: 2023-09-07 | End: 2023-09-07

## 2023-09-07 RX ORDER — FENTANYL CITRATE-0.9 % NACL/PF 10 MCG/ML
25-200 PLASTIC BAG, INJECTION (ML) INTRAVENOUS CONTINUOUS
Status: DISCONTINUED | OUTPATIENT
Start: 2023-09-07 | End: 2023-09-14 | Stop reason: HOSPADM

## 2023-09-07 RX ORDER — SODIUM CHLORIDE, SODIUM LACTATE, POTASSIUM CHLORIDE, CALCIUM CHLORIDE 600; 310; 30; 20 MG/100ML; MG/100ML; MG/100ML; MG/100ML
INJECTION, SOLUTION INTRAVENOUS CONTINUOUS
Status: DISCONTINUED | OUTPATIENT
Start: 2023-09-07 | End: 2023-09-07

## 2023-09-07 RX ORDER — ACETAMINOPHEN 160 MG/5ML
650 LIQUID ORAL EVERY 6 HOURS PRN
Status: DISCONTINUED | OUTPATIENT
Start: 2023-09-07 | End: 2023-09-14 | Stop reason: HOSPADM

## 2023-09-07 RX ORDER — PROCAINAMIDE HYDROCHLORIDE 500 MG/ML
50 INJECTION INTRAMUSCULAR; INTRAVENOUS ONCE
Status: COMPLETED | OUTPATIENT
Start: 2023-09-07 | End: 2023-09-07

## 2023-09-07 RX ORDER — PROPOFOL 10 MG/ML
INJECTION, EMULSION INTRAVENOUS
Status: COMPLETED
Start: 2023-09-07 | End: 2023-09-07

## 2023-09-07 RX ORDER — DIPHENHYDRAMINE HYDROCHLORIDE 50 MG/ML
25 INJECTION INTRAMUSCULAR; INTRAVENOUS EVERY 6 HOURS PRN
Status: DISCONTINUED | OUTPATIENT
Start: 2023-09-07 | End: 2023-09-14 | Stop reason: HOSPADM

## 2023-09-07 RX ORDER — CHLORHEXIDINE GLUCONATE ORAL RINSE 1.2 MG/ML
15 SOLUTION DENTAL 2 TIMES DAILY
Status: DISCONTINUED | OUTPATIENT
Start: 2023-09-07 | End: 2023-09-07

## 2023-09-07 RX ORDER — IPRATROPIUM BROMIDE AND ALBUTEROL SULFATE 2.5; .5 MG/3ML; MG/3ML
1 SOLUTION RESPIRATORY (INHALATION) EVERY 4 HOURS PRN
Status: DISCONTINUED | OUTPATIENT
Start: 2023-09-07 | End: 2023-09-14 | Stop reason: HOSPADM

## 2023-09-07 RX ORDER — ROCURONIUM BROMIDE 10 MG/ML
50 INJECTION, SOLUTION INTRAVENOUS ONCE
Status: COMPLETED | OUTPATIENT
Start: 2023-09-07 | End: 2023-09-07

## 2023-09-07 RX ORDER — ETOMIDATE 2 MG/ML
20 INJECTION INTRAVENOUS ONCE
Status: COMPLETED | OUTPATIENT
Start: 2023-09-07 | End: 2023-09-07

## 2023-09-07 RX ORDER — ETOMIDATE 2 MG/ML
INJECTION INTRAVENOUS
Status: COMPLETED
Start: 2023-09-07 | End: 2023-09-07

## 2023-09-07 RX ORDER — 0.9 % SODIUM CHLORIDE 0.9 %
500 INTRAVENOUS SOLUTION INTRAVENOUS ONCE
Status: DISCONTINUED | OUTPATIENT
Start: 2023-09-07 | End: 2023-09-07

## 2023-09-07 RX ADMIN — SODIUM CHLORIDE, POTASSIUM CHLORIDE, SODIUM LACTATE AND CALCIUM CHLORIDE: 600; 310; 30; 20 INJECTION, SOLUTION INTRAVENOUS at 12:40

## 2023-09-07 RX ADMIN — IPRATROPIUM BROMIDE 0.5 MG: 0.5 SOLUTION RESPIRATORY (INHALATION) at 19:47

## 2023-09-07 RX ADMIN — Medication 1 MCG/MIN: at 18:31

## 2023-09-07 RX ADMIN — ETOMIDATE 20 MG: 2 INJECTION INTRAVENOUS at 12:15

## 2023-09-07 RX ADMIN — PHENYLEPHRINE HYDROCHLORIDE 260 MCG/MIN: 10 INJECTION INTRAVENOUS at 22:36

## 2023-09-07 RX ADMIN — IPRATROPIUM BROMIDE 0.5 MG: 0.5 SOLUTION RESPIRATORY (INHALATION) at 12:15

## 2023-09-07 RX ADMIN — ARFORMOTEROL TARTRATE 15 MCG: 15 SOLUTION RESPIRATORY (INHALATION) at 19:47

## 2023-09-07 RX ADMIN — Medication 25 MCG/HR: at 13:13

## 2023-09-07 RX ADMIN — PHENYLEPHRINE HYDROCHLORIDE 10 MCG/MIN: 10 INJECTION INTRAVENOUS at 09:59

## 2023-09-07 RX ADMIN — PROCAINAMIDE HYDROCHLORIDE 2 MG/MIN: 100 INJECTION INTRAMUSCULAR; INTRAVENOUS at 19:42

## 2023-09-07 RX ADMIN — Medication 10 ML: at 19:35

## 2023-09-07 RX ADMIN — SODIUM CHLORIDE, POTASSIUM CHLORIDE, SODIUM LACTATE AND CALCIUM CHLORIDE 1000 ML: 600; 310; 30; 20 INJECTION, SOLUTION INTRAVENOUS at 10:45

## 2023-09-07 RX ADMIN — INSULIN HUMAN 10 UNITS: 100 INJECTION, SOLUTION PARENTERAL at 22:04

## 2023-09-07 RX ADMIN — OXYCODONE HYDROCHLORIDE 10 MG: 10 TABLET ORAL at 01:25

## 2023-09-07 RX ADMIN — MEROPENEM 1000 MG: 1 INJECTION, POWDER, FOR SOLUTION INTRAVENOUS at 17:49

## 2023-09-07 RX ADMIN — ROCURONIUM BROMIDE 50 MG: 10 INJECTION, SOLUTION INTRAVENOUS at 12:15

## 2023-09-07 RX ADMIN — SODIUM ZIRCONIUM CYCLOSILICATE 10 G: 10 POWDER, FOR SUSPENSION ORAL at 22:13

## 2023-09-07 RX ADMIN — VASOPRESSIN 2 UNITS: 20 INJECTION INTRAVENOUS at 12:15

## 2023-09-07 RX ADMIN — OXYCODONE HYDROCHLORIDE 10 MG: 10 TABLET ORAL at 06:33

## 2023-09-07 RX ADMIN — PHENYLEPHRINE HYDROCHLORIDE 180 MCG/MIN: 10 INJECTION INTRAVENOUS at 15:59

## 2023-09-07 RX ADMIN — Medication 10 ML: at 16:16

## 2023-09-07 RX ADMIN — APIXABAN 2.5 MG: 2.5 TABLET, FILM COATED ORAL at 20:25

## 2023-09-07 RX ADMIN — SODIUM CHLORIDE, POTASSIUM CHLORIDE, SODIUM LACTATE AND CALCIUM CHLORIDE 1000 ML: 600; 310; 30; 20 INJECTION, SOLUTION INTRAVENOUS at 13:06

## 2023-09-07 RX ADMIN — HYDROCORTISONE SODIUM SUCCINATE 100 MG: 100 INJECTION, POWDER, FOR SOLUTION INTRAMUSCULAR; INTRAVENOUS at 13:53

## 2023-09-07 RX ADMIN — SODIUM BICARBONATE 50 MEQ: 84 INJECTION INTRAVENOUS at 22:05

## 2023-09-07 RX ADMIN — WATER 2000 MG: 1 INJECTION INTRAMUSCULAR; INTRAVENOUS; SUBCUTANEOUS at 13:44

## 2023-09-07 RX ADMIN — PROPOFOL 50 MG: 10 INJECTION, EMULSION INTRAVENOUS at 12:15

## 2023-09-07 RX ADMIN — LIDOCAINE HYDROCHLORIDE 2 MG/MIN: 4 INJECTION, SOLUTION INTRAVENOUS at 16:59

## 2023-09-07 RX ADMIN — PHENYLEPHRINE HYDROCHLORIDE 300 MCG/MIN: 10 INJECTION INTRAVENOUS at 19:34

## 2023-09-07 RX ADMIN — ARFORMOTEROL TARTRATE 15 MCG: 15 SOLUTION RESPIRATORY (INHALATION) at 07:39

## 2023-09-07 RX ADMIN — PANTOPRAZOLE SODIUM 40 MG: 40 INJECTION, POWDER, FOR SOLUTION INTRAVENOUS at 18:16

## 2023-09-07 RX ADMIN — MORPHINE SULFATE 2 MG: 4 INJECTION, SOLUTION INTRAMUSCULAR; INTRAVENOUS at 10:34

## 2023-09-07 RX ADMIN — Medication 2 MG: at 12:20

## 2023-09-07 RX ADMIN — CHLORHEXIDINE GLUCONATE 15 ML: 1.2 RINSE ORAL at 20:47

## 2023-09-07 RX ADMIN — DEXTROSE MONOHYDRATE 250 ML: 100 INJECTION, SOLUTION INTRAVENOUS at 22:04

## 2023-09-07 RX ADMIN — WATER 2000 MG: 1 INJECTION INTRAMUSCULAR; INTRAVENOUS; SUBCUTANEOUS at 01:26

## 2023-09-07 RX ADMIN — IPRATROPIUM BROMIDE 0.5 MG: 0.5 SOLUTION RESPIRATORY (INHALATION) at 14:52

## 2023-09-07 RX ADMIN — PROCAINAMIDE HYDROCHLORIDE 50 MG: 100 INJECTION INTRAMUSCULAR; INTRAVENOUS at 19:36

## 2023-09-07 RX ADMIN — MORPHINE SULFATE 2 MG: 2 INJECTION, SOLUTION INTRAMUSCULAR; INTRAVENOUS at 12:00

## 2023-09-07 RX ADMIN — VANCOMYCIN HYDROCHLORIDE 1250 MG: 10 INJECTION, POWDER, LYOPHILIZED, FOR SOLUTION INTRAVENOUS at 17:53

## 2023-09-07 RX ADMIN — HYDROCORTISONE SODIUM SUCCINATE 100 MG: 100 INJECTION, POWDER, FOR SOLUTION INTRAMUSCULAR; INTRAVENOUS at 18:16

## 2023-09-07 RX ADMIN — ETOMIDATE INJECTION 20 MG: 2 SOLUTION INTRAVENOUS at 12:15

## 2023-09-07 RX ADMIN — CALCIUM GLUCONATE 1000 MG: 10 INJECTION, SOLUTION INTRAVENOUS at 22:02

## 2023-09-07 RX ADMIN — CHLORHEXIDINE GLUCONATE 15 ML: 1.2 RINSE ORAL at 19:44

## 2023-09-07 RX ADMIN — ONDANSETRON 4 MG: 2 INJECTION INTRAMUSCULAR; INTRAVENOUS at 06:33

## 2023-09-07 RX ADMIN — IPRATROPIUM BROMIDE 0.5 MG: 0.5 SOLUTION RESPIRATORY (INHALATION) at 07:39

## 2023-09-07 ASSESSMENT — PAIN SCALES - GENERAL
PAINLEVEL_OUTOF10: 8
PAINLEVEL_OUTOF10: 6
PAINLEVEL_OUTOF10: 0
PAINLEVEL_OUTOF10: 7
PAINLEVEL_OUTOF10: 9
PAINLEVEL_OUTOF10: 0

## 2023-09-07 ASSESSMENT — PAIN DESCRIPTION - ORIENTATION
ORIENTATION: RIGHT
ORIENTATION: RIGHT

## 2023-09-07 ASSESSMENT — PAIN DESCRIPTION - DESCRIPTORS
DESCRIPTORS: ACHING;DISCOMFORT;SORE
DESCRIPTORS: ACHING;DISCOMFORT;SORE

## 2023-09-07 ASSESSMENT — PULMONARY FUNCTION TESTS
PIF_VALUE: 20
PIF_VALUE: 19
PIF_VALUE: 20
PIF_VALUE: 21
PIF_VALUE: 19
PIF_VALUE: 20
PIF_VALUE: 20
PIF_VALUE: 19
PIF_VALUE: 20

## 2023-09-07 ASSESSMENT — PAIN - FUNCTIONAL ASSESSMENT
PAIN_FUNCTIONAL_ASSESSMENT: PREVENTS OR INTERFERES SOME ACTIVE ACTIVITIES AND ADLS
PAIN_FUNCTIONAL_ASSESSMENT: PREVENTS OR INTERFERES SOME ACTIVE ACTIVITIES AND ADLS

## 2023-09-07 ASSESSMENT — ENCOUNTER SYMPTOMS: SHORTNESS OF BREATH: 0

## 2023-09-07 ASSESSMENT — PAIN DESCRIPTION - LOCATION
LOCATION: LEG
LOCATION: LEG

## 2023-09-07 NOTE — PROGRESS NOTES
Pharmacy Consultation Note  (Antibiotic Dosing and Monitoring)    Initial consult date: 9/7/23  Consulting physician/provider: Dr. May Standard  Drug: Vancomycin  Indication: HAP; 7 days    Age/  Gender Height Weight IBW  Allergy Information   83 y.o./female 5' 1\" (154.9 cm) 100 lb (45.4 kg)     Ideal body weight: 52.1 kg (114 lb 15 oz)   Iodides, Naproxen, Doxycycline, and Iodine      Renal Function:  Recent Labs     09/06/23  0434 09/07/23  0830 09/07/23  1030   BUN 28* 40* 44*   CREATININE 0.8 1.4* 1.5*       Intake/Output Summary (Last 24 hours) at 9/7/2023 1633  Last data filed at 9/7/2023 0433  Gross per 24 hour   Intake 1420 ml   Output --   Net 1420 ml       Vancomycin Monitoring:  Trough:  No results for input(s): VANCOTROUGH in the last 72 hours. Random:  No results for input(s): VANCORANDOM in the last 72 hours. No results for input(s): Azell Lambert in the last 72 hours. Historical Cultures:  Organism   Date Value Ref Range Status   07/25/2017 Gram-positive cocci (A)  Final     No results for input(s): BC in the last 72 hours. Vancomycin Administration Times:  Recent vancomycin administrations        No vancomycin IV orders with administrations found. Assessment:  Patient is a 80 y.o. female who has been initiated on vancomycin  Estimated Creatinine Clearance: 20 mL/min (A) (based on SCr of 1.5 mg/dL (H)).   To dose vancomycin, pharmacy will be utilizing dosing based off of levels because of patient's renal impairment/insufficiency    Plan:  Vancomycin 1250 mg IV x 1 dose  Random level tomorrow am on 9/8  Will continue to monitor renal function   Pharmacy to follow      Thank you for the consult,    Kath Kent, AlisiaD, BCPS, BCCCP 9/7/2023 4:34 PM

## 2023-09-07 NOTE — PROGRESS NOTES
At approx. 8 am patient was brung up from 54 from an RRT, for low blood pressure with residents, bedside nurse and supervisor present. Patient is alert able to answer questions, NS bolus ordered and was started and 100 ml given, 400 ml completed, STAT rainbow labs completed and sent to lab, bedside nurse stayed with patient unable to get blood pressure on the right arm, B/p on right leg 71/46 hr 122, pulse ox 88% on 3.5 liters oxygen up to 5 liters pulse ox at 94%. After completion of bolus B/p 56/47 in left arm, patient restless, blood pressure retaken in left leg unable to get reading  due to restlessness and patient would not allow nursing to reposition her. Dr. Renea Corbett instructed nurse to let attending know if blood pressure does not elevate to normal. Message sent to attending regarding blood pressure instructed to contact physician from the RRT for transport. Denise Gates in the alvarado asked to evaluate patient and transfer initiated  to [phone]. B/p 80/40 in right leg Hr 127. Patient transferred at 9:10am  by this nurse and nurse extern and report given to the bedside RN.

## 2023-09-07 NOTE — CARE COORDINATION
9/7. RRT this morning for hyportension. Will need PT/OT evals when able to assist with discharge planning.  Moo Aguiar RN

## 2023-09-07 NOTE — CODE DOCUMENTATION
1651 pulseless vtach CPR started  1651 Epi GIVEN    1652 shock 200   1652 pulse check ROSC   1653 Bicarb given

## 2023-09-07 NOTE — PROGRESS NOTES
Patient was trying rip out art line and arnold cath. Due to this Bilateral soft wrist non violent restraints were needed.

## 2023-09-07 NOTE — PROGRESS NOTES
4 Eyes Skin Assessment     NAME:  Dasie Phoenix  YOB: 1940  MEDICAL RECORD NUMBER:  [id number]    The patient is being assessed for  Admission    I agree that at least one RN has performed a thorough Head to Toe Skin Assessment on the patient. ALL assessment sites listed below have been assessed. Areas assessed by both nurses:    Sacrum. Buttock, Coccyx, Ischium        Does the Patient have a Wound?  No noted wound(s)       Elroy Prevention initiated by RN: Yes  Wound Care Orders initiated by RN: No    Pressure Injury (Stage 3,4, Unstageable, DTI, NWPT, and Complex wounds) if present, place Wound referral order by RN under : No    New Ostomies, if present place, Ostomy referral order under : No     Nurse 1 eSignature: Electronically signed by Gama Culp RN on 9/7/23 at 3:21 AM EDT    **SHARE this note so that the co-signing nurse can place an eSignature**    Nurse 2 eSignature: Electronically signed by Andrei Frank RN on 9/7/23 at 6:04 AM EDT

## 2023-09-07 NOTE — PROGRESS NOTES
Updated Preston Almaguer who is the patient's cousin on recent events with patients    He stated there are no other family members alive in the area and that him and his wife Francesca Maynard) live in [place].  He is willing to help the patient and be her next of Kin and assist this patient as she is has been confused and hallucinating since coming to the ICU

## 2023-09-07 NOTE — PROCEDURES
Arterial Catheter Insertion Procedure Note    Procedure: Insertion of Arterial Catheter    Indications:      Procedure Details   Informed consent was obtained for the procedure, including sedation. Risks of  hemorrhage, arrhythmia, infection and adverse drug reaction were discussed. Under sterile conditions the skin above the left groin was prepped with betadine and covered with a sterile drape. Local anesthesia was applied to the skin and subcutaneous tissues. A 22-gauge needle was inserted into the femoral artery. A guide wire was then passed easily through the catheter which was then advanced with no resistance. Good pulsatile blood returned. The catheter was sutured into place. Findings: There were no changes to vital signs. Patient did tolerate procedure well. Total time of procedure 30 minutes.

## 2023-09-07 NOTE — PROGRESS NOTES
Intubation Record    Date: 9/7/23  Time: 1213pm  Patient identity confirmed: Yes  Preoxygenation: BVM with 100% O2   Laryngoscope size and type: glidescope  Airway introducer used: Yes  Evac: Yes  Tube size: 8  Number of attempts :1  Cords visualized:  [x]Clearly [] Poorly   Breath sounds present bilaterally: Yes  ETCO2 47  ETT to lip: 22  Tube secured with: commercial tube sumner   Chest x-ray ordered: Yes  Difficulties encountered:   None     Difficult Airway: no           Corrina Charles RCP, RRT

## 2023-09-07 NOTE — PROGRESS NOTES
Renal Dose Adjustment Policy (Generic)     This patient is on medication that requires renal, weight, and/or indication dose adjustment. Date Body Weight IBW  Adjusted BW SCr  CrCl Dialysis status   9/7/2023 100 lb (45.4 kg) Ideal body weight: 52.1 kg (114 lb 15 oz) Serum creatinine: 1.5 mg/dL (H) 09/07/23 1030  Estimated creatinine clearance: 20 mL/min (A) N/a       Pharmacy has dose-adjusted the following medication(s):    Date Previous Order Adjusted Order   9/7/2023 Meropenem 1000mg q12h Meropenem 500mg q12h       These changes were made per protocol according to the [facility]   Automatic Renal Dose Adjustment Policy. *Please note this dose may need readjusted if patient's condition changes. Please contact pharmacy with any questions regarding these changes.     Kaela Archer, [facility]  9/7/2023  4:49 PM

## 2023-09-07 NOTE — PROGRESS NOTES
Spoke with Mrs Liz Gonzalez (Ofelia Main wife as he was unavailable) updated about Cardiac arrest.      Was also informed by family that the patient's Son might not even be alive but she is not sure because no one in their family has talk to [place] the patients estranged son in many years.

## 2023-09-07 NOTE — PROGRESS NOTES
Patient transferred to MICU, following belongings packed and walked down: upper dentures, home medications, shoes, t-shirt, pants, two personal bags with documents and hygiene supplies, cellphone, and eye glasses.

## 2023-09-07 NOTE — PROGRESS NOTES
CODE NOTE:    Patient was noted in [facility]. Hypotension noted. Patient had a cardiac arrest with ROSC achieved in 2 minutes. 1 dose of epi and 1 dose of bicarb given. Lidocaine 2 mg push followed by lidocaine drip to run at 2 mg/h. Status post cardioversion 200 J x 1. Bicarb drip to started at 100 mill per hour. Antibiotic switched to meropenem and vancomycin. PROSPER Escalona

## 2023-09-07 NOTE — PROGRESS NOTES
This patient is on medication that requires renal, weight, and/or indication dose adjustment. Date Body Weight IBW  Adjusted BW SCr  CrCl Dialysis status   9/7/2023 100 lb (45.4 kg) Ideal body weight: 52.1 kg (114 lb 15 oz) Serum creatinine: 1.4 mg/dL (H) 09/07/23 0830  Estimated creatinine clearance: 22 mL/min (A) N/a       Pharmacy has dose-adjusted the following medication(s):    Date Previous Order Adjusted Order   9/7/2023 Zosyn 3.375 q8h IV Zosyn 4.5 g q8h IV       These changes were made per protocol according to the [address] [facility]. *Please note this dose may need readjusted if patient's condition changes. Please contact pharmacy with any questions regarding these changes.     Miguel Pardo, [place] [facility]  9/7/2023  2:16 PM

## 2023-09-08 ENCOUNTER — APPOINTMENT (OUTPATIENT)
Dept: CT IMAGING | Age: 83
End: 2023-09-08
Payer: MEDICARE

## 2023-09-08 ENCOUNTER — APPOINTMENT (OUTPATIENT)
Dept: ULTRASOUND IMAGING | Age: 83
End: 2023-09-08
Payer: MEDICARE

## 2023-09-08 ENCOUNTER — APPOINTMENT (OUTPATIENT)
Dept: GENERAL RADIOLOGY | Age: 83
End: 2023-09-08
Payer: MEDICARE

## 2023-09-08 PROBLEM — S72.001A CLOSED FRACTURE OF RIGHT HIP (HCC): Status: ACTIVE | Noted: 2023-09-08

## 2023-09-08 PROBLEM — I47.20 VT (VENTRICULAR TACHYCARDIA) (HCC): Status: ACTIVE | Noted: 2023-09-08

## 2023-09-08 PROBLEM — K80.20 CALCULUS OF GALLBLADDER WITHOUT CHOLECYSTITIS WITHOUT OBSTRUCTION: Status: ACTIVE | Noted: 2023-09-08

## 2023-09-08 LAB
AADO2: 111.6 MMHG
AADO2: 144.4 MMHG
ALBUMIN SERPL-MCNC: 2.4 G/DL (ref 3.5–5.2)
ALP SERPL-CCNC: 68 U/L (ref 35–104)
ALT SERPL-CCNC: 2026 U/L (ref 0–32)
ANION GAP SERPL CALCULATED.3IONS-SCNC: 21 MMOL/L (ref 7–16)
ANION GAP SERPL CALCULATED.3IONS-SCNC: 21 MMOL/L (ref 7–16)
AST SERPL-CCNC: 4744 U/L (ref 0–31)
B.E.: -7 MMOL/L (ref -3–3)
B.E.: -9.9 MMOL/L (ref -3–3)
BASOPHILS # BLD: 0 K/UL (ref 0–0.2)
BASOPHILS NFR BLD: 0 % (ref 0–2)
BILIRUB SERPL-MCNC: 1.3 MG/DL (ref 0–1.2)
BUN SERPL-MCNC: 43 MG/DL (ref 6–23)
BUN SERPL-MCNC: 46 MG/DL (ref 6–23)
CA-I BLD-SCNC: 1 MMOL/L (ref 1.15–1.33)
CALCIUM SERPL-MCNC: 7.5 MG/DL (ref 8.6–10.2)
CALCIUM SERPL-MCNC: 8.7 MG/DL (ref 8.6–10.2)
CHLORIDE SERPL-SCNC: 101 MMOL/L (ref 98–107)
CHLORIDE SERPL-SCNC: 98 MMOL/L (ref 98–107)
CO2 SERPL-SCNC: 14 MMOL/L (ref 22–29)
CO2 SERPL-SCNC: 17 MMOL/L (ref 22–29)
COHB: 0.1 % (ref 0–1.5)
COHB: 0.8 % (ref 0–1.5)
CREAT SERPL-MCNC: 1.8 MG/DL (ref 0.5–1)
CREAT SERPL-MCNC: 1.8 MG/DL (ref 0.5–1)
CRITICAL: ABNORMAL
CRITICAL: ABNORMAL
DATE ANALYZED: ABNORMAL
DATE ANALYZED: ABNORMAL
DATE LAST DOSE: NORMAL
DATE OF COLLECTION: ABNORMAL
DATE OF COLLECTION: ABNORMAL
EKG ATRIAL RATE: 160 BPM
EKG P AXIS: 160 DEGREES
EKG Q-T INTERVAL: 138 MS
EKG QRS DURATION: 32 MS
EKG QTC CALCULATION (BAZETT): 225 MS
EKG R AXIS: 0 DEGREES
EKG T AXIS: 165 DEGREES
EKG VENTRICULAR RATE: 160 BPM
EOSINOPHIL # BLD: 0 K/UL (ref 0.05–0.5)
EOSINOPHILS RELATIVE PERCENT: 0 % (ref 0–6)
ERYTHROCYTE [DISTWIDTH] IN BLOOD BY AUTOMATED COUNT: 14.3 % (ref 11.5–15)
FIO2: 40 %
FIO2: 40 %
GFR SERPL CREATININE-BSD FRML MDRD: 28 ML/MIN/1.73M2
GFR SERPL CREATININE-BSD FRML MDRD: 29 ML/MIN/1.73M2
GLUCOSE BLD-MCNC: 173 MG/DL (ref 74–99)
GLUCOSE BLD-MCNC: 208 MG/DL (ref 74–99)
GLUCOSE BLD-MCNC: 265 MG/DL (ref 74–99)
GLUCOSE BLD-MCNC: 294 MG/DL (ref 74–99)
GLUCOSE BLD-MCNC: 306 MG/DL (ref 74–99)
GLUCOSE SERPL-MCNC: 263 MG/DL (ref 74–99)
GLUCOSE SERPL-MCNC: 272 MG/DL (ref 74–99)
HCO3: 14 MMOL/L (ref 22–26)
HCO3: 16.7 MMOL/L (ref 22–26)
HCT VFR BLD AUTO: 37.6 % (ref 34–48)
HGB BLD-MCNC: 12 G/DL (ref 11.5–15.5)
HHB: 1.9 % (ref 0–5)
HHB: 3 % (ref 0–5)
INR PPP: 4
L PNEUMO1 AG UR QL IA.RAPID: NEGATIVE
LAB: ABNORMAL
LAB: ABNORMAL
LACTATE BLDV-SCNC: 10.2 MMOL/L (ref 0.5–2.2)
LACTATE BLDV-SCNC: 10.2 MMOL/L (ref 0.5–2.2)
LACTATE BLDV-SCNC: 4 MMOL/L (ref 0.5–2.2)
LACTATE BLDV-SCNC: 5.3 MMOL/L (ref 0.5–2.2)
LACTATE BLDV-SCNC: 7.5 MMOL/L (ref 0.5–2.2)
LACTATE BLDV-SCNC: 9.5 MMOL/L (ref 0.5–2.2)
LYMPHOCYTES NFR BLD: 0.46 K/UL (ref 1.5–4)
LYMPHOCYTES RELATIVE PERCENT: 4 % (ref 20–42)
Lab: ABNORMAL
Lab: ABNORMAL
MAGNESIUM SERPL-MCNC: 1.8 MG/DL (ref 1.6–2.6)
MCH RBC QN AUTO: 30 PG (ref 26–35)
MCHC RBC AUTO-ENTMCNC: 31.9 G/DL (ref 32–34.5)
MCV RBC AUTO: 94 FL (ref 80–99.9)
METHB: 0.3 % (ref 0–1.5)
METHB: 0.4 % (ref 0–1.5)
MODE: AC
MODE: AC
MONOCYTES NFR BLD: 0.68 K/UL (ref 0.1–0.95)
MONOCYTES NFR BLD: 5 % (ref 2–12)
NEUTROPHILS NFR BLD: 91 % (ref 43–80)
NEUTS SEG NFR BLD: 11.96 K/UL (ref 1.8–7.3)
NUCLEATED RED BLOOD CELLS: 1 PER 100 WBC
O2 CONTENT: 15.2 ML/DL
O2 CONTENT: 17.8 ML/DL
O2 SATURATION: 97 % (ref 92–98.5)
O2 SATURATION: 98.1 % (ref 92–98.5)
O2HB: 95.9 % (ref 94–97)
O2HB: 97.6 % (ref 94–97)
OPERATOR ID: ABNORMAL
OPERATOR ID: ABNORMAL
PATIENT TEMP: 37 C
PATIENT TEMP: 37 C
PCO2: 25.1 MMHG (ref 35–45)
PCO2: 28.8 MMHG (ref 35–45)
PEEP/CPAP: 5 CMH2O
PEEP/CPAP: 5 CMH2O
PFO2: 2.44 MMHG/%
PFO2: 3.37 MMHG/%
PH BLOOD GAS: 7.36 (ref 7.35–7.45)
PH BLOOD GAS: 7.38 (ref 7.35–7.45)
PHOSPHATE SERPL-MCNC: 5 MG/DL (ref 2.5–4.5)
PLATELET # BLD AUTO: 133 K/UL (ref 130–450)
PMV BLD AUTO: 12.1 FL (ref 7–12)
PO2: 134.7 MMHG (ref 75–100)
PO2: 97.7 MMHG (ref 75–100)
POTASSIUM SERPL-SCNC: 4.6 MMOL/L (ref 3.5–5)
POTASSIUM SERPL-SCNC: 4.7 MMOL/L (ref 3.5–5)
PROT SERPL-MCNC: 4.6 G/DL (ref 6.4–8.3)
PROTHROMBIN TIME: 43.4 SEC (ref 9.3–12.4)
RBC # BLD AUTO: 4 M/UL (ref 3.5–5.5)
RBC # BLD: ABNORMAL 10*6/UL
RI(T): 0.83
RI(T): 1.48
RR MECHANICAL: 20 B/MIN
RR MECHANICAL: 20 B/MIN
S PNEUM AG SPEC QL: NEGATIVE
SODIUM SERPL-SCNC: 136 MMOL/L (ref 132–146)
SODIUM SERPL-SCNC: 136 MMOL/L (ref 132–146)
SOURCE, BLOOD GAS: ABNORMAL
SOURCE, BLOOD GAS: ABNORMAL
SPECIMEN SOURCE: NORMAL
T3FREE SERPL-MCNC: 0.92 PG/ML (ref 2–4.4)
T4 FREE SERPL-MCNC: 1.1 NG/DL (ref 0.9–1.7)
THB: 10.9 G/DL (ref 11.5–16.5)
THB: 13.1 G/DL (ref 11.5–16.5)
TIME ANALYZED: 115
TIME ANALYZED: 414
TME LAST DOSE: NORMAL H
TROPONIN I SERPL HS-MCNC: 1027 NG/L (ref 0–9)
TROPONIN I SERPL HS-MCNC: 1163 NG/L (ref 0–9)
TROPONIN I SERPL HS-MCNC: 1201 NG/L (ref 0–9)
VANCOMYCIN DOSE: NORMAL MG
VANCOMYCIN SERPL-MCNC: 13.1 UG/ML (ref 5–40)
VT MECHANICAL: 400 ML
VT MECHANICAL: 400 ML
WBC OTHER # BLD: 13.1 K/UL (ref 4.5–11.5)

## 2023-09-08 PROCEDURE — 2580000003 HC RX 258: Performed by: INTERNAL MEDICINE

## 2023-09-08 PROCEDURE — 84100 ASSAY OF PHOSPHORUS: CPT

## 2023-09-08 PROCEDURE — 94640 AIRWAY INHALATION TREATMENT: CPT

## 2023-09-08 PROCEDURE — 6360000002 HC RX W HCPCS: Performed by: STUDENT IN AN ORGANIZED HEALTH CARE EDUCATION/TRAINING PROGRAM

## 2023-09-08 PROCEDURE — 3E1M39Z IRRIGATION OF PERITONEAL CAVITY USING DIALYSATE, PERCUTANEOUS APPROACH: ICD-10-PCS | Performed by: FAMILY MEDICINE

## 2023-09-08 PROCEDURE — 99221 1ST HOSP IP/OBS SF/LOW 40: CPT

## 2023-09-08 PROCEDURE — 6360000004 HC RX CONTRAST MEDICATION: Performed by: RADIOLOGY

## 2023-09-08 PROCEDURE — 36556 INSERT NON-TUNNEL CV CATH: CPT

## 2023-09-08 PROCEDURE — 85025 COMPLETE CBC W/AUTO DIFF WBC: CPT

## 2023-09-08 PROCEDURE — 80053 COMPREHEN METABOLIC PANEL: CPT

## 2023-09-08 PROCEDURE — P9047 ALBUMIN (HUMAN), 25%, 50ML: HCPCS | Performed by: INTERNAL MEDICINE

## 2023-09-08 PROCEDURE — 2000000000 HC ICU R&B

## 2023-09-08 PROCEDURE — 6370000000 HC RX 637 (ALT 250 FOR IP): Performed by: FAMILY MEDICINE

## 2023-09-08 PROCEDURE — 99291 CRITICAL CARE FIRST HOUR: CPT | Performed by: INTERNAL MEDICINE

## 2023-09-08 PROCEDURE — 2500000003 HC RX 250 WO HCPCS: Performed by: NURSE PRACTITIONER

## 2023-09-08 PROCEDURE — 99223 1ST HOSP IP/OBS HIGH 75: CPT | Performed by: SURGERY

## 2023-09-08 PROCEDURE — 02HV33Z INSERTION OF INFUSION DEVICE INTO SUPERIOR VENA CAVA, PERCUTANEOUS APPROACH: ICD-10-PCS | Performed by: INTERNAL MEDICINE

## 2023-09-08 PROCEDURE — 82805 BLOOD GASES W/O2 SATURATION: CPT

## 2023-09-08 PROCEDURE — 2580000003 HC RX 258: Performed by: NURSE PRACTITIONER

## 2023-09-08 PROCEDURE — 74176 CT ABD & PELVIS W/O CONTRAST: CPT

## 2023-09-08 PROCEDURE — 6360000002 HC RX W HCPCS: Performed by: INTERNAL MEDICINE

## 2023-09-08 PROCEDURE — 6370000000 HC RX 637 (ALT 250 FOR IP): Performed by: INTERNAL MEDICINE

## 2023-09-08 PROCEDURE — 84484 ASSAY OF TROPONIN QUANT: CPT

## 2023-09-08 PROCEDURE — 90945 DIALYSIS ONE EVALUATION: CPT

## 2023-09-08 PROCEDURE — 99223 1ST HOSP IP/OBS HIGH 75: CPT | Performed by: PSYCHIATRY & NEUROLOGY

## 2023-09-08 PROCEDURE — 70498 CT ANGIOGRAPHY NECK: CPT

## 2023-09-08 PROCEDURE — 6360000002 HC RX W HCPCS: Performed by: PSYCHIATRY & NEUROLOGY

## 2023-09-08 PROCEDURE — 85610 PROTHROMBIN TIME: CPT

## 2023-09-08 PROCEDURE — 94003 VENT MGMT INPAT SUBQ DAY: CPT

## 2023-09-08 PROCEDURE — 93005 ELECTROCARDIOGRAM TRACING: CPT | Performed by: NURSE PRACTITIONER

## 2023-09-08 PROCEDURE — 83605 ASSAY OF LACTIC ACID: CPT

## 2023-09-08 PROCEDURE — 30233N1 TRANSFUSION OF NONAUTOLOGOUS RED BLOOD CELLS INTO PERIPHERAL VEIN, PERCUTANEOUS APPROACH: ICD-10-PCS | Performed by: FAMILY MEDICINE

## 2023-09-08 PROCEDURE — 6360000002 HC RX W HCPCS: Performed by: NURSE PRACTITIONER

## 2023-09-08 PROCEDURE — 70496 CT ANGIOGRAPHY HEAD: CPT

## 2023-09-08 PROCEDURE — 2580000003 HC RX 258: Performed by: STUDENT IN AN ORGANIZED HEALTH CARE EDUCATION/TRAINING PROGRAM

## 2023-09-08 PROCEDURE — 80202 ASSAY OF VANCOMYCIN: CPT

## 2023-09-08 PROCEDURE — 93306 TTE W/DOPPLER COMPLETE: CPT

## 2023-09-08 PROCEDURE — 83735 ASSAY OF MAGNESIUM: CPT

## 2023-09-08 PROCEDURE — 2500000003 HC RX 250 WO HCPCS: Performed by: INTERNAL MEDICINE

## 2023-09-08 PROCEDURE — 82330 ASSAY OF CALCIUM: CPT

## 2023-09-08 PROCEDURE — 2500000003 HC RX 250 WO HCPCS

## 2023-09-08 PROCEDURE — 70450 CT HEAD/BRAIN W/O DYE: CPT

## 2023-09-08 PROCEDURE — 82962 GLUCOSE BLOOD TEST: CPT

## 2023-09-08 PROCEDURE — 6370000000 HC RX 637 (ALT 250 FOR IP): Performed by: NURSE PRACTITIONER

## 2023-09-08 PROCEDURE — 71275 CT ANGIOGRAPHY CHEST: CPT

## 2023-09-08 PROCEDURE — 71045 X-RAY EXAM CHEST 1 VIEW: CPT

## 2023-09-08 PROCEDURE — 76705 ECHO EXAM OF ABDOMEN: CPT

## 2023-09-08 RX ORDER — AMIODARONE HYDROCHLORIDE 200 MG/1
200 TABLET ORAL 3 TIMES DAILY
Status: DISCONTINUED | OUTPATIENT
Start: 2023-09-08 | End: 2023-09-08

## 2023-09-08 RX ORDER — POTASSIUM CHLORIDE 29.8 MG/ML
20 INJECTION INTRAVENOUS PRN
Status: DISCONTINUED | OUTPATIENT
Start: 2023-09-08 | End: 2023-09-14 | Stop reason: HOSPADM

## 2023-09-08 RX ORDER — SODIUM CHLORIDE 9 MG/ML
INJECTION, SOLUTION INTRAVENOUS CONTINUOUS
Status: DISCONTINUED | OUTPATIENT
Start: 2023-09-08 | End: 2023-09-09

## 2023-09-08 RX ORDER — LEVOTHYROXINE SODIUM 0.03 MG/1
25 TABLET ORAL DAILY
Status: DISCONTINUED | OUTPATIENT
Start: 2023-09-09 | End: 2023-09-14 | Stop reason: HOSPADM

## 2023-09-08 RX ORDER — MAGNESIUM SULFATE IN WATER 40 MG/ML
2000 INJECTION, SOLUTION INTRAVENOUS ONCE
Status: COMPLETED | OUTPATIENT
Start: 2023-09-08 | End: 2023-09-08

## 2023-09-08 RX ORDER — INSULIN LISPRO 100 [IU]/ML
0-4 INJECTION, SOLUTION INTRAVENOUS; SUBCUTANEOUS EVERY 4 HOURS
Status: DISCONTINUED | OUTPATIENT
Start: 2023-09-08 | End: 2023-09-08

## 2023-09-08 RX ORDER — MAGNESIUM SULFATE 1 G/100ML
1000 INJECTION INTRAVENOUS PRN
Status: DISCONTINUED | OUTPATIENT
Start: 2023-09-08 | End: 2023-09-11 | Stop reason: ALTCHOICE

## 2023-09-08 RX ORDER — 0.9 % SODIUM CHLORIDE 0.9 %
1000 INTRAVENOUS SOLUTION INTRAVENOUS ONCE
Status: COMPLETED | OUTPATIENT
Start: 2023-09-08 | End: 2023-09-08

## 2023-09-08 RX ORDER — LEVETIRACETAM 500 MG/5ML
500 INJECTION, SOLUTION, CONCENTRATE INTRAVENOUS EVERY 12 HOURS
Status: DISCONTINUED | OUTPATIENT
Start: 2023-09-08 | End: 2023-09-09

## 2023-09-08 RX ORDER — LEVETIRACETAM 500 MG/5ML
1000 INJECTION, SOLUTION, CONCENTRATE INTRAVENOUS ONCE
Status: COMPLETED | OUTPATIENT
Start: 2023-09-08 | End: 2023-09-08

## 2023-09-08 RX ORDER — SODIUM CHLORIDE 9 MG/ML
INJECTION, SOLUTION INTRAVENOUS CONTINUOUS
Status: DISCONTINUED | OUTPATIENT
Start: 2023-09-08 | End: 2023-09-11 | Stop reason: ALTCHOICE

## 2023-09-08 RX ORDER — FERROUS SULFATE 300 MG/5ML
300 LIQUID (ML) ORAL
Status: DISCONTINUED | OUTPATIENT
Start: 2023-09-08 | End: 2023-09-14 | Stop reason: HOSPADM

## 2023-09-08 RX ORDER — LEVOTHYROXINE SODIUM ANHYDROUS 100 UG/5ML
50 INJECTION, POWDER, LYOPHILIZED, FOR SOLUTION INTRAVENOUS ONCE
Status: COMPLETED | OUTPATIENT
Start: 2023-09-08 | End: 2023-09-08

## 2023-09-08 RX ORDER — ALBUMIN (HUMAN) 12.5 G/50ML
25 SOLUTION INTRAVENOUS EVERY 8 HOURS
Status: DISCONTINUED | OUTPATIENT
Start: 2023-09-08 | End: 2023-09-11

## 2023-09-08 RX ORDER — CALCIUM GLUCONATE 10 MG/ML
1000 INJECTION, SOLUTION INTRAVENOUS PRN
Status: DISCONTINUED | OUTPATIENT
Start: 2023-09-08 | End: 2023-09-11 | Stop reason: ALTCHOICE

## 2023-09-08 RX ORDER — INSULIN LISPRO 100 [IU]/ML
0-8 INJECTION, SOLUTION INTRAVENOUS; SUBCUTANEOUS
Status: DISCONTINUED | OUTPATIENT
Start: 2023-09-08 | End: 2023-09-14 | Stop reason: HOSPADM

## 2023-09-08 RX ADMIN — MEROPENEM 500 MG: 500 INJECTION, POWDER, FOR SOLUTION INTRAVENOUS at 18:19

## 2023-09-08 RX ADMIN — ARFORMOTEROL TARTRATE 15 MCG: 15 SOLUTION RESPIRATORY (INHALATION) at 09:35

## 2023-09-08 RX ADMIN — MAGNESIUM SULFATE HEPTAHYDRATE 2000 MG: 40 INJECTION, SOLUTION INTRAVENOUS at 05:49

## 2023-09-08 RX ADMIN — ARFORMOTEROL TARTRATE 15 MCG: 15 SOLUTION RESPIRATORY (INHALATION) at 20:46

## 2023-09-08 RX ADMIN — CALCIUM CHLORIDE 8000 MG: 100 INJECTION INTRAVENOUS; INTRAVENTRICULAR at 20:04

## 2023-09-08 RX ADMIN — IPRATROPIUM BROMIDE 0.5 MG: 0.5 SOLUTION RESPIRATORY (INHALATION) at 20:46

## 2023-09-08 RX ADMIN — SODIUM BICARBONATE: 84 INJECTION, SOLUTION INTRAVENOUS at 19:47

## 2023-09-08 RX ADMIN — SODIUM CHLORIDE: 9 INJECTION, SOLUTION INTRAVENOUS at 18:26

## 2023-09-08 RX ADMIN — THIAMINE HYDROCHLORIDE 500 MG: 100 INJECTION, SOLUTION INTRAMUSCULAR; INTRAVENOUS at 11:03

## 2023-09-08 RX ADMIN — ACETAMINOPHEN 650 MG: 650 SOLUTION ORAL at 14:54

## 2023-09-08 RX ADMIN — IPRATROPIUM BROMIDE 0.5 MG: 0.5 SOLUTION RESPIRATORY (INHALATION) at 09:35

## 2023-09-08 RX ADMIN — Medication 300 MG: at 10:37

## 2023-09-08 RX ADMIN — HYDROCORTISONE SODIUM SUCCINATE 50 MG: 100 INJECTION, POWDER, FOR SOLUTION INTRAMUSCULAR; INTRAVENOUS at 19:35

## 2023-09-08 RX ADMIN — ACETYLCYSTEINE 4540 MG: 200 INJECTION, SOLUTION INTRAVENOUS at 18:16

## 2023-09-08 RX ADMIN — SODIUM BICARBONATE 50 MEQ: 84 INJECTION INTRAVENOUS at 10:30

## 2023-09-08 RX ADMIN — HYDROCORTISONE SODIUM SUCCINATE 50 MG: 100 INJECTION, POWDER, FOR SOLUTION INTRAMUSCULAR; INTRAVENOUS at 14:53

## 2023-09-08 RX ADMIN — LEVOTHYROXINE SODIUM ANHYDROUS 50 MCG: 100 INJECTION, POWDER, LYOPHILIZED, FOR SOLUTION INTRAVENOUS at 10:35

## 2023-09-08 RX ADMIN — DIPHENHYDRAMINE HYDROCHLORIDE 25 MG: 50 INJECTION, SOLUTION INTRAMUSCULAR; INTRAVENOUS at 14:53

## 2023-09-08 RX ADMIN — Medication 12 MCG/MIN: at 18:39

## 2023-09-08 RX ADMIN — SODIUM CHLORIDE 1000 ML: 9 INJECTION, SOLUTION INTRAVENOUS at 05:47

## 2023-09-08 RX ADMIN — CHLORHEXIDINE GLUCONATE 15 ML: 1.2 RINSE ORAL at 09:16

## 2023-09-08 RX ADMIN — IPRATROPIUM BROMIDE 0.5 MG: 0.5 SOLUTION RESPIRATORY (INHALATION) at 12:38

## 2023-09-08 RX ADMIN — SODIUM BICARBONATE: 84 INJECTION, SOLUTION INTRAVENOUS at 03:32

## 2023-09-08 RX ADMIN — SODIUM CHLORIDE, PRESERVATIVE FREE 20 MG: 5 INJECTION INTRAVENOUS at 09:26

## 2023-09-08 RX ADMIN — INSULIN LISPRO 6 UNITS: 100 INJECTION, SOLUTION INTRAVENOUS; SUBCUTANEOUS at 16:40

## 2023-09-08 RX ADMIN — ACETYLCYSTEINE 2280 MG: 200 INJECTION, SOLUTION INTRAVENOUS at 12:31

## 2023-09-08 RX ADMIN — CHLORHEXIDINE GLUCONATE 15 ML: 1.2 RINSE ORAL at 19:36

## 2023-09-08 RX ADMIN — IPRATROPIUM BROMIDE 0.5 MG: 0.5 SOLUTION RESPIRATORY (INHALATION) at 17:14

## 2023-09-08 RX ADMIN — HYDROCORTISONE SODIUM SUCCINATE 100 MG: 100 INJECTION, POWDER, FOR SOLUTION INTRAMUSCULAR; INTRAVENOUS at 02:21

## 2023-09-08 RX ADMIN — Medication: at 18:28

## 2023-09-08 RX ADMIN — PHENYLEPHRINE HYDROCHLORIDE 280 MCG/MIN: 10 INJECTION INTRAVENOUS at 01:47

## 2023-09-08 RX ADMIN — Medication: at 18:27

## 2023-09-08 RX ADMIN — ALBUMIN (HUMAN) 25 G: 0.25 INJECTION, SOLUTION INTRAVENOUS at 19:36

## 2023-09-08 RX ADMIN — HYDROCORTISONE SODIUM SUCCINATE 50 MG: 100 INJECTION, POWDER, FOR SOLUTION INTRAMUSCULAR; INTRAVENOUS at 09:17

## 2023-09-08 RX ADMIN — SODIUM BICARBONATE: 84 INJECTION, SOLUTION INTRAVENOUS at 11:48

## 2023-09-08 RX ADMIN — LEVETIRACETAM 500 MG: 100 INJECTION INTRAVENOUS at 19:35

## 2023-09-08 RX ADMIN — IOPAMIDOL 75 ML: 755 INJECTION, SOLUTION INTRAVENOUS at 17:10

## 2023-09-08 RX ADMIN — INSULIN LISPRO 1 UNITS: 100 INJECTION, SOLUTION INTRAVENOUS; SUBCUTANEOUS at 04:57

## 2023-09-08 RX ADMIN — ACETYLCYSTEINE 6820 MG: 200 INJECTION, SOLUTION INTRAVENOUS at 10:57

## 2023-09-08 RX ADMIN — Medication 10 ML: at 19:35

## 2023-09-08 RX ADMIN — LIDOCAINE HYDROCHLORIDE 2 MG/MIN: 4 INJECTION, SOLUTION INTRAVENOUS at 08:37

## 2023-09-08 RX ADMIN — MEROPENEM 500 MG: 500 INJECTION, POWDER, FOR SOLUTION INTRAVENOUS at 04:33

## 2023-09-08 RX ADMIN — INSULIN LISPRO 2 UNITS: 100 INJECTION, SOLUTION INTRAVENOUS; SUBCUTANEOUS at 09:26

## 2023-09-08 RX ADMIN — CALCIUM GLUCONATE 2000 MG: 98 INJECTION, SOLUTION INTRAVENOUS at 06:49

## 2023-09-08 RX ADMIN — PHENYLEPHRINE HYDROCHLORIDE 280 MCG/MIN: 10 INJECTION INTRAVENOUS at 04:55

## 2023-09-08 RX ADMIN — INSULIN LISPRO 6 UNITS: 100 INJECTION, SOLUTION INTRAVENOUS; SUBCUTANEOUS at 12:39

## 2023-09-08 RX ADMIN — PHENYLEPHRINE HYDROCHLORIDE 270 MCG/MIN: 10 INJECTION INTRAVENOUS at 08:09

## 2023-09-08 RX ADMIN — LEVETIRACETAM 1000 MG: 100 INJECTION INTRAVENOUS at 09:59

## 2023-09-08 RX ADMIN — VANCOMYCIN HYDROCHLORIDE 1000 MG: 1 INJECTION, POWDER, LYOPHILIZED, FOR SOLUTION INTRAVENOUS at 09:34

## 2023-09-08 RX ADMIN — ALBUMIN (HUMAN) 25 G: 0.25 INJECTION, SOLUTION INTRAVENOUS at 09:59

## 2023-09-08 RX ADMIN — PHYTONADIONE 10 MG: 10 INJECTION, EMULSION INTRAMUSCULAR; INTRAVENOUS; SUBCUTANEOUS at 19:51

## 2023-09-08 RX ADMIN — SODIUM CHLORIDE 1000 ML: 9 INJECTION, SOLUTION INTRAVENOUS at 00:24

## 2023-09-08 RX ADMIN — SODIUM CHLORIDE 1000 ML: 9 INJECTION, SOLUTION INTRAVENOUS at 00:56

## 2023-09-08 ASSESSMENT — PAIN SCALES - GENERAL
PAINLEVEL_OUTOF10: 0

## 2023-09-08 ASSESSMENT — PULMONARY FUNCTION TESTS
PIF_VALUE: 22
PIF_VALUE: 21
PIF_VALUE: 22
PIF_VALUE: 20
PIF_VALUE: 25
PIF_VALUE: 24
PIF_VALUE: 22
PIF_VALUE: 23
PIF_VALUE: 20
PIF_VALUE: 23
PIF_VALUE: 21
PIF_VALUE: 25
PIF_VALUE: 23
PIF_VALUE: 24
PIF_VALUE: 22
PIF_VALUE: 3
PIF_VALUE: 22
PIF_VALUE: 24
PIF_VALUE: 23
PIF_VALUE: 23
PIF_VALUE: 3
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
## 2023-09-08 NOTE — CONSULTS
-- -- -- 94 20 --   09/07/23 1213 -- -- -- (!) 124 19 --   09/07/23 1200 (!) 78/38 -- -- 87 11 (!) 72 %         Intake/Output Summary (Last 24 hours) at 9/8/2023 1147  Last data filed at 9/8/2023 1020  Gross per 24 hour   Intake 2064.36 ml   Output 605 ml   Net 1459.36 ml       Constitutional: Patient in no acute distress   Head: normocephalic, atraumatic   Neck: supple, no jvd  Cardiovascular: regular rate and rhythm, no murmurs, gallops, or rubs   Respiratory: Clear, no rales, rhochi, or wheezes,   Gastrointestinal: soft, nontender, nondistended, no hepatosplenomegaly  Ext:   Neuro:  Skin: dry, no rash   Back: nontender    Data:    Recent Labs     09/06/23  0434 09/07/23  0830 09/08/23  0405   WBC 9.7 8.4 13.1*   HGB 12.1 11.6 12.0   HCT 38.2 38.3 37.6   MCV 94.1 98.7 94.0    157 133       Recent Labs     09/07/23  1741 09/07/23 2015 09/07/23  2333 09/08/23  0405    139 136 136   K 5.6* 5.6* 4.6 4.7    100 98 101   CO2 16* 15* 17* 14*   CREATININE 1.6* 1.7* 1.8* 1.8*   BUN 44* 46* 46* 43*   LABGLOM 31* 30* 29* 28*   GLUCOSE 124* 154* 263* 272*   CALCIUM 10.1 9.2 8.7 7.5*   PHOS 6.7* 6.4*  --  5.0*   MG 2.2 2.2  --  1.8       Vit D, 25-Hydroxy   Date Value Ref Range Status   09/03/2023 12.7 (L) 30.0 - 100.0 ng/mL Final       No results found for: PTH    Recent Labs     09/07/23  1741 09/07/23 2015 09/08/23  0405   * 439* 2,026*   * 822* 4,744*   ALKPHOS 78 85 68   BILITOT 1.4* 1.8* 1.3*       Recent Labs     09/07/23  1741 09/07/23 2015 09/08/23  0405   LABALBU 3.1* 3.3* 2.4*       No results found for: FERRITIN, IRON, TIBC    No results found for: UEVUVJON23    No results found for: FOLATE      Lab Results   Component Value Date/Time    COLORU Yellow 09/07/2023 10:30 AM    NITRU NEGATIVE 09/07/2023 10:30 AM    GLUCOSEU NEGATIVE 09/07/2023 10:30 AM    KETUA TRACE 09/07/2023 10:30 AM    UROBILINOGEN 0.2 09/07/2023 10:30 AM    BILIRUBINUR NEGATIVE 09/07/2023 10:30 AM       No

## 2023-09-08 NOTE — PROGRESS NOTES
Spoke with RN at [address]. RN states that 13 hour allergy prep was initiated on 9/7/23 at 1353. RN also educated on need for a waiver from ordering physician as the GFR is 28 and the orders call for administration of iodine contrast and the cut off is GFR 40.  Scheduled for 9/8/23 1600
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
## 2023-09-08 NOTE — PROGRESS NOTES
Spoke to nurse, He will check on iodine allergy and low GFR. He will let us know status of those exams.

## 2023-09-08 NOTE — PROGRESS NOTES
CONTRAST WAIVER NOTE     Patient is noted to be allergic to iodine. Discussed with cousin Neftaly Romero. Patient will need CTA chest to rule out PE requiring contrast.  Benadryl, Pepcid and steroids are given. Simone Martinez understands the risks of allergy with anaphylaxis and possible impending kidney failure and dialysis from the contrast administration.   Simone Martinez give me consent over phone to proceed with contrast administration to rule out PE in CTA chest.

## 2023-09-08 NOTE — PLAN OF CARE
Problem: Pain  Goal: Verbalizes/displays adequate comfort level or baseline comfort level  Outcome: Progressing     Problem: Chronic Conditions and Co-morbidities  Goal: Patient's chronic conditions and co-morbidity symptoms are monitored and maintained or improved  Outcome: Progressing     Problem: Discharge Planning  Goal: Discharge to home or other facility with appropriate resources  Outcome: Progressing     Problem: Safety - Adult  Goal: Free from fall injury  9/8/2023 0316 by Herlinda Erickson RN  Outcome: Progressing  9/7/2023 1949 by Nora Manzano RN  Outcome: Progressing     Problem: Skin/Tissue Integrity  Goal: Absence of new skin breakdown  Description: 1. Monitor for areas of redness and/or skin breakdown  2. Assess vascular access sites hourly  3. Every 4-6 hours minimum:  Change oxygen saturation probe site  4. Every 4-6 hours:  If on nasal continuous positive airway pressure, respiratory therapy assess nares and determine need for appliance change or resting period.   Outcome: Progressing

## 2023-09-08 NOTE — PROGRESS NOTES
LSW reviewed chart with NP, attempted to contact pts cousin [phone], no answer. Message left to review goals of care.

## 2023-09-08 NOTE — PROGRESS NOTES
Per Dr. Wojciech Romero, for CRRT Pump speed at 200,  PBP-saline at 100cc per hour, Dialysate 4/0/1.2 at 1000 Post 4/0/1.2 at 1000cc, run patient even at this timeorder repeated and verified.

## 2023-09-08 NOTE — PROGRESS NOTES
Pharmacy Consultation Note  (Antibiotic Dosing and Monitoring)    Initial consult date: 9/7/23  Consulting physician/provider: Dr. Amanda Saavedra  Drug: Vancomycin  Indication: HAP; 7 days    Age/  Gender Height Weight IBW  Allergy Information   83 y.o./female 5' 1\" (154.9 cm) 100 lb (45.4 kg)     Ideal body weight: 52.1 kg (114 lb 15 oz)   Iodides, Naproxen, Doxycycline, and Iodine      Renal Function:  Recent Labs     09/07/23 2015 09/07/23 2333 09/08/23  0405   BUN 46* 46* 43*   CREATININE 1.7* 1.8* 1.8*         Intake/Output Summary (Last 24 hours) at 9/8/2023 0804  Last data filed at 9/8/2023 0600  Gross per 24 hour   Intake 2064.36 ml   Output 565 ml   Net 1499.36 ml         Vancomycin Monitoring:  Trough:  No results for input(s): VANCOTROUGH in the last 72 hours. Random:    Recent Labs     09/08/23  0405   VANCORANDOM 13.1       No results for input(s): Beola Alu in the last 72 hours. Historical Cultures:  Organism   Date Value Ref Range Status   07/25/2017 Gram-positive cocci (A)  Final     No results for input(s): BC in the last 72 hours. Vancomycin Administration Times:  Recent vancomycin administrations        No vancomycin IV orders with administrations found. Assessment:  Patient is a 80 y.o. female who has been initiated on vancomycin  Estimated Creatinine Clearance: 17 mL/min (A) (based on SCr of 1.8 mg/dL (H)).   To dose vancomycin, pharmacy will be utilizing dosing based off of levels because of patient's renal impairment/insufficiency    Plan:  Random tomorrow AM    Thank you for the consult,  Dusty Vaca, PharmD, BCPS, BCCCP 9/8/2023 8:04 AM

## 2023-09-08 NOTE — PROGRESS NOTES
When released from bilateral soft wrist restraints patient pulls at life saving medical devices. Patient remains in bilateral soft wrist restraints. Patient is stable. Will continue to monitor.

## 2023-09-08 NOTE — PROGRESS NOTES
Updated Dr. Farhana Silva the following    labs are back and her K is 5.4. The patient is able to get a puls ox now it is 100%. She is in RVR Afib/ Flutter with a Rate of 129-140.

## 2023-09-09 ENCOUNTER — APPOINTMENT (OUTPATIENT)
Dept: GENERAL RADIOLOGY | Age: 83
End: 2023-09-09
Payer: MEDICARE

## 2023-09-09 LAB
AADO2: 142.9 MMHG
ALBUMIN SERPL-MCNC: 3.4 G/DL (ref 3.5–5.2)
ALBUMIN SERPL-MCNC: 3.4 G/DL (ref 3.5–5.2)
ALBUMIN SERPL-MCNC: 3.8 G/DL (ref 3.5–5.2)
ALP SERPL-CCNC: 57 U/L (ref 35–104)
ALP SERPL-CCNC: 60 U/L (ref 35–104)
ALP SERPL-CCNC: 61 U/L (ref 35–104)
ALT SERPL-CCNC: 411 U/L (ref 0–32)
ALT SERPL-CCNC: 498 U/L (ref 0–32)
ALT SERPL-CCNC: 952 U/L (ref 0–32)
ANION GAP SERPL CALCULATED.3IONS-SCNC: 10 MMOL/L (ref 7–16)
ANION GAP SERPL CALCULATED.3IONS-SCNC: 11 MMOL/L (ref 7–16)
ANION GAP SERPL CALCULATED.3IONS-SCNC: 16 MMOL/L (ref 7–16)
ANION GAP SERPL CALCULATED.3IONS-SCNC: 8 MMOL/L (ref 7–16)
AST SERPL-CCNC: 2011 U/L (ref 0–31)
AST SERPL-CCNC: 2561 U/L (ref 0–31)
AST SERPL-CCNC: 4396 U/L (ref 0–31)
B.E.: 6.9 MMOL/L (ref -3–3)
BASOPHILS # BLD: 0.01 K/UL (ref 0–0.2)
BASOPHILS NFR BLD: 0 % (ref 0–2)
BILIRUB SERPL-MCNC: 1.4 MG/DL (ref 0–1.2)
BILIRUB SERPL-MCNC: 1.4 MG/DL (ref 0–1.2)
BILIRUB SERPL-MCNC: 1.6 MG/DL (ref 0–1.2)
BUN SERPL-MCNC: 16 MG/DL (ref 6–23)
BUN SERPL-MCNC: 18 MG/DL (ref 6–23)
BUN SERPL-MCNC: 20 MG/DL (ref 6–23)
BUN SERPL-MCNC: 28 MG/DL (ref 6–23)
CA-I BLD-SCNC: 0.98 MMOL/L (ref 1.15–1.33)
CA-I BLD-SCNC: 1.03 MMOL/L (ref 1.15–1.33)
CA-I BLD-SCNC: 1.11 MMOL/L (ref 1.15–1.33)
CA-I BLD-SCNC: 1.14 MMOL/L (ref 1.15–1.33)
CA-I BLD-SCNC: 1.17 MMOL/L (ref 1.15–1.33)
CALCIUM SERPL-MCNC: 7.6 MG/DL (ref 8.6–10.2)
CALCIUM SERPL-MCNC: 8.1 MG/DL (ref 8.6–10.2)
CALCIUM SERPL-MCNC: 8.3 MG/DL (ref 8.6–10.2)
CALCIUM SERPL-MCNC: 8.8 MG/DL (ref 8.6–10.2)
CHLORIDE SERPL-SCNC: 100 MMOL/L (ref 98–107)
CHLORIDE SERPL-SCNC: 101 MMOL/L (ref 98–107)
CHLORIDE SERPL-SCNC: 103 MMOL/L (ref 98–107)
CHLORIDE SERPL-SCNC: 103 MMOL/L (ref 98–107)
CO2 SERPL-SCNC: 25 MMOL/L (ref 22–29)
CO2 SERPL-SCNC: 25 MMOL/L (ref 22–29)
CO2 SERPL-SCNC: 26 MMOL/L (ref 22–29)
CO2 SERPL-SCNC: 27 MMOL/L (ref 22–29)
COHB: 0.1 % (ref 0–1.5)
CREAT SERPL-MCNC: 1 MG/DL (ref 0.5–1)
CREAT SERPL-MCNC: 1.1 MG/DL (ref 0.5–1)
CREAT SERPL-MCNC: 1.2 MG/DL (ref 0.5–1)
CREAT SERPL-MCNC: 1.4 MG/DL (ref 0.5–1)
CRITICAL: ABNORMAL
DATE ANALYZED: ABNORMAL
DATE LAST DOSE: NORMAL
DATE OF COLLECTION: ABNORMAL
EKG ATRIAL RATE: 107 BPM
EKG ATRIAL RATE: 117 BPM
EKG P AXIS: -5 DEGREES
EKG P-R INTERVAL: 256 MS
EKG Q-T INTERVAL: 302 MS
EKG Q-T INTERVAL: 326 MS
EKG QRS DURATION: 68 MS
EKG QRS DURATION: 80 MS
EKG QTC CALCULATION (BAZETT): 403 MS
EKG QTC CALCULATION (BAZETT): 543 MS
EKG R AXIS: 111 DEGREES
EKG R AXIS: 147 DEGREES
EKG T AXIS: -15 DEGREES
EKG T AXIS: 152 DEGREES
EKG VENTRICULAR RATE: 107 BPM
EKG VENTRICULAR RATE: 167 BPM
EOSINOPHIL # BLD: 0 K/UL (ref 0.05–0.5)
EOSINOPHILS RELATIVE PERCENT: 0 % (ref 0–6)
ERYTHROCYTE [DISTWIDTH] IN BLOOD BY AUTOMATED COUNT: 13.9 % (ref 11.5–15)
FIO2: 40 %
GFR SERPL CREATININE-BSD FRML MDRD: 39 ML/MIN/1.73M2
GFR SERPL CREATININE-BSD FRML MDRD: 47 ML/MIN/1.73M2
GFR SERPL CREATININE-BSD FRML MDRD: 52 ML/MIN/1.73M2
GFR SERPL CREATININE-BSD FRML MDRD: 54 ML/MIN/1.73M2
GLUCOSE BLD-MCNC: 111 MG/DL (ref 74–99)
GLUCOSE BLD-MCNC: 126 MG/DL (ref 74–99)
GLUCOSE BLD-MCNC: 131 MG/DL (ref 74–99)
GLUCOSE SERPL-MCNC: 113 MG/DL (ref 74–99)
GLUCOSE SERPL-MCNC: 114 MG/DL (ref 74–99)
GLUCOSE SERPL-MCNC: 114 MG/DL (ref 74–99)
GLUCOSE SERPL-MCNC: 175 MG/DL (ref 74–99)
HCO3: 28 MMOL/L (ref 22–26)
HCT VFR BLD AUTO: 28.2 % (ref 34–48)
HGB BLD-MCNC: 9.4 G/DL (ref 11.5–15.5)
HHB: 2.4 % (ref 0–5)
IMM GRANULOCYTES # BLD AUTO: 0.13 K/UL (ref 0–0.58)
IMM GRANULOCYTES NFR BLD: 1 % (ref 0–5)
INR PPP: 3.5
LAB: ABNORMAL
LACTATE BLDV-SCNC: 2.6 MMOL/L (ref 0.5–2.2)
LACTATE BLDV-SCNC: 2.6 MMOL/L (ref 0.5–2.2)
LACTATE BLDV-SCNC: 3 MMOL/L (ref 0.5–2.2)
LACTATE BLDV-SCNC: 3.2 MMOL/L (ref 0.5–2.2)
LACTATE BLDV-SCNC: 3.3 MMOL/L (ref 0.5–2.2)
LACTATE BLDV-SCNC: 3.5 MMOL/L (ref 0.5–2.2)
LYMPHOCYTES NFR BLD: 0.63 K/UL (ref 1.5–4)
LYMPHOCYTES RELATIVE PERCENT: 6 % (ref 20–42)
Lab: ABNORMAL
MAGNESIUM SERPL-MCNC: 1.9 MG/DL (ref 1.6–2.6)
MAGNESIUM SERPL-MCNC: 2 MG/DL (ref 1.6–2.6)
MAGNESIUM SERPL-MCNC: 2.1 MG/DL (ref 1.6–2.6)
MCH RBC QN AUTO: 29.6 PG (ref 26–35)
MCHC RBC AUTO-ENTMCNC: 33.3 G/DL (ref 32–34.5)
MCV RBC AUTO: 88.7 FL (ref 80–99.9)
METHB: 0.4 % (ref 0–1.5)
MICROORGANISM SPEC CULT: NO GROWTH
MICROORGANISM SPEC CULT: NORMAL
MICROORGANISM SPEC CULT: NORMAL
MODE: AC
MONOCYTES NFR BLD: 0.57 K/UL (ref 0.1–0.95)
MONOCYTES NFR BLD: 5 % (ref 2–12)
NEUTROPHILS NFR BLD: 88 % (ref 43–80)
NEUTS SEG NFR BLD: 9.38 K/UL (ref 1.8–7.3)
O2 CONTENT: 14.5 ML/DL
O2 SATURATION: 97.6 % (ref 92–98.5)
O2HB: 97.1 % (ref 94–97)
OPERATOR ID: ABNORMAL
PARTIAL THROMBOPLASTIN TIME: 36.2 SEC (ref 24.5–35.1)
PATIENT TEMP: 37 C
PCO2: 28.3 MMHG (ref 35–45)
PEEP/CPAP: 5 CMH2O
PFO2: 2.5 MMHG/%
PH BLOOD GAS: 7.61 (ref 7.35–7.45)
PHOSPHATE SERPL-MCNC: 1.9 MG/DL (ref 2.5–4.5)
PHOSPHATE SERPL-MCNC: 2.3 MG/DL (ref 2.5–4.5)
PHOSPHATE SERPL-MCNC: 2.4 MG/DL (ref 2.5–4.5)
PHOSPHATE SERPL-MCNC: 2.5 MG/DL (ref 2.5–4.5)
PHOSPHATE SERPL-MCNC: 2.5 MG/DL (ref 2.5–4.5)
PLATELET # BLD AUTO: 95 K/UL (ref 130–450)
PLATELET CONFIRMATION: NORMAL
PMV BLD AUTO: 12.8 FL (ref 7–12)
PO2: 99.8 MMHG (ref 75–100)
POTASSIUM SERPL-SCNC: 3.8 MMOL/L (ref 3.5–5)
POTASSIUM SERPL-SCNC: 4 MMOL/L (ref 3.5–5)
POTASSIUM SERPL-SCNC: 4.1 MMOL/L (ref 3.5–5)
POTASSIUM SERPL-SCNC: 4.1 MMOL/L (ref 3.5–5)
PROT SERPL-MCNC: 4.8 G/DL (ref 6.4–8.3)
PROT SERPL-MCNC: 4.9 G/DL (ref 6.4–8.3)
PROT SERPL-MCNC: 5.2 G/DL (ref 6.4–8.3)
PROTHROMBIN TIME: 38.1 SEC (ref 9.3–12.4)
RBC # BLD AUTO: 3.18 M/UL (ref 3.5–5.5)
RI(T): 1.43
RR MECHANICAL: 20 B/MIN
SODIUM SERPL-SCNC: 137 MMOL/L (ref 132–146)
SODIUM SERPL-SCNC: 138 MMOL/L (ref 132–146)
SODIUM SERPL-SCNC: 139 MMOL/L (ref 132–146)
SODIUM SERPL-SCNC: 141 MMOL/L (ref 132–146)
SOURCE, BLOOD GAS: ABNORMAL
SPECIMEN DESCRIPTION: NORMAL
THB: 10.5 G/DL (ref 11.5–16.5)
TIME ANALYZED: 416
TME LAST DOSE: NORMAL H
VANCOMYCIN DOSE: NORMAL MG
VANCOMYCIN SERPL-MCNC: 14.8 UG/ML (ref 5–40)
VT MECHANICAL: 400 ML
WBC OTHER # BLD: 10.7 K/UL (ref 4.5–11.5)

## 2023-09-09 PROCEDURE — 6360000002 HC RX W HCPCS: Performed by: INTERNAL MEDICINE

## 2023-09-09 PROCEDURE — 85610 PROTHROMBIN TIME: CPT

## 2023-09-09 PROCEDURE — 84100 ASSAY OF PHOSPHORUS: CPT

## 2023-09-09 PROCEDURE — 83735 ASSAY OF MAGNESIUM: CPT

## 2023-09-09 PROCEDURE — 85730 THROMBOPLASTIN TIME PARTIAL: CPT

## 2023-09-09 PROCEDURE — 6370000000 HC RX 637 (ALT 250 FOR IP): Performed by: INTERNAL MEDICINE

## 2023-09-09 PROCEDURE — 82962 GLUCOSE BLOOD TEST: CPT

## 2023-09-09 PROCEDURE — 6360000002 HC RX W HCPCS: Performed by: PHYSICIAN ASSISTANT

## 2023-09-09 PROCEDURE — 94003 VENT MGMT INPAT SUBQ DAY: CPT

## 2023-09-09 PROCEDURE — 6370000000 HC RX 637 (ALT 250 FOR IP): Performed by: STUDENT IN AN ORGANIZED HEALTH CARE EDUCATION/TRAINING PROGRAM

## 2023-09-09 PROCEDURE — 6370000000 HC RX 637 (ALT 250 FOR IP): Performed by: FAMILY MEDICINE

## 2023-09-09 PROCEDURE — 80048 BASIC METABOLIC PNL TOTAL CA: CPT

## 2023-09-09 PROCEDURE — P9047 ALBUMIN (HUMAN), 25%, 50ML: HCPCS | Performed by: INTERNAL MEDICINE

## 2023-09-09 PROCEDURE — 2580000003 HC RX 258: Performed by: NURSE PRACTITIONER

## 2023-09-09 PROCEDURE — 85025 COMPLETE CBC W/AUTO DIFF WBC: CPT

## 2023-09-09 PROCEDURE — 71045 X-RAY EXAM CHEST 1 VIEW: CPT

## 2023-09-09 PROCEDURE — 82805 BLOOD GASES W/O2 SATURATION: CPT

## 2023-09-09 PROCEDURE — 6370000000 HC RX 637 (ALT 250 FOR IP): Performed by: NURSE PRACTITIONER

## 2023-09-09 PROCEDURE — 2580000003 HC RX 258

## 2023-09-09 PROCEDURE — 99232 SBSQ HOSP IP/OBS MODERATE 35: CPT | Performed by: SURGERY

## 2023-09-09 PROCEDURE — C9254 INJECTION, LACOSAMIDE: HCPCS | Performed by: PHYSICIAN ASSISTANT

## 2023-09-09 PROCEDURE — 80202 ASSAY OF VANCOMYCIN: CPT

## 2023-09-09 PROCEDURE — 99233 SBSQ HOSP IP/OBS HIGH 50: CPT | Performed by: PHYSICIAN ASSISTANT

## 2023-09-09 PROCEDURE — 2580000003 HC RX 258: Performed by: INTERNAL MEDICINE

## 2023-09-09 PROCEDURE — 2580000003 HC RX 258: Performed by: PHYSICIAN ASSISTANT

## 2023-09-09 PROCEDURE — 6360000002 HC RX W HCPCS: Performed by: STUDENT IN AN ORGANIZED HEALTH CARE EDUCATION/TRAINING PROGRAM

## 2023-09-09 PROCEDURE — 36415 COLL VENOUS BLD VENIPUNCTURE: CPT

## 2023-09-09 PROCEDURE — 2000000000 HC ICU R&B

## 2023-09-09 PROCEDURE — 99291 CRITICAL CARE FIRST HOUR: CPT | Performed by: INTERNAL MEDICINE

## 2023-09-09 PROCEDURE — 2500000003 HC RX 250 WO HCPCS: Performed by: INTERNAL MEDICINE

## 2023-09-09 PROCEDURE — 82330 ASSAY OF CALCIUM: CPT

## 2023-09-09 PROCEDURE — 2500000003 HC RX 250 WO HCPCS: Performed by: NURSE PRACTITIONER

## 2023-09-09 PROCEDURE — 6360000002 HC RX W HCPCS: Performed by: NURSE PRACTITIONER

## 2023-09-09 PROCEDURE — 80053 COMPREHEN METABOLIC PANEL: CPT

## 2023-09-09 PROCEDURE — 83605 ASSAY OF LACTIC ACID: CPT

## 2023-09-09 PROCEDURE — 2580000003 HC RX 258: Performed by: STUDENT IN AN ORGANIZED HEALTH CARE EDUCATION/TRAINING PROGRAM

## 2023-09-09 PROCEDURE — 94640 AIRWAY INHALATION TREATMENT: CPT

## 2023-09-09 RX ORDER — LEVETIRACETAM 500 MG/5ML
750 INJECTION, SOLUTION, CONCENTRATE INTRAVENOUS EVERY 12 HOURS
Status: DISCONTINUED | OUTPATIENT
Start: 2023-09-09 | End: 2023-09-09

## 2023-09-09 RX ORDER — WATER 1000 ML/1000ML
INJECTION, SOLUTION INTRAVENOUS
Status: COMPLETED
Start: 2023-09-09 | End: 2023-09-09

## 2023-09-09 RX ADMIN — Medication: at 11:00

## 2023-09-09 RX ADMIN — CALCIUM GLUCONATE 1000 MG: 10 INJECTION, SOLUTION INTRAVENOUS at 11:35

## 2023-09-09 RX ADMIN — Medication 10 ML: at 10:21

## 2023-09-09 RX ADMIN — SODIUM BICARBONATE: 84 INJECTION, SOLUTION INTRAVENOUS at 09:21

## 2023-09-09 RX ADMIN — MIDAZOLAM 2 MG: 1 INJECTION INTRAMUSCULAR; INTRAVENOUS at 23:54

## 2023-09-09 RX ADMIN — HYDROCORTISONE SODIUM SUCCINATE 50 MG: 100 INJECTION, POWDER, FOR SOLUTION INTRAMUSCULAR; INTRAVENOUS at 08:42

## 2023-09-09 RX ADMIN — IPRATROPIUM BROMIDE AND ALBUTEROL SULFATE 1 DOSE: .5; 2.5 SOLUTION RESPIRATORY (INHALATION) at 21:10

## 2023-09-09 RX ADMIN — IPRATROPIUM BROMIDE 0.5 MG: 0.5 SOLUTION RESPIRATORY (INHALATION) at 16:01

## 2023-09-09 RX ADMIN — SODIUM CHLORIDE, PRESERVATIVE FREE 10 MG: 5 INJECTION INTRAVENOUS at 08:42

## 2023-09-09 RX ADMIN — Medication 5000 UNITS: at 10:26

## 2023-09-09 RX ADMIN — THIAMINE HYDROCHLORIDE 500 MG: 100 INJECTION, SOLUTION INTRAMUSCULAR; INTRAVENOUS at 08:53

## 2023-09-09 RX ADMIN — MEROPENEM 500 MG: 500 INJECTION, POWDER, FOR SOLUTION INTRAVENOUS at 20:30

## 2023-09-09 RX ADMIN — CALCIUM GLUCONATE 1000 MG: 10 INJECTION, SOLUTION INTRAVENOUS at 16:53

## 2023-09-09 RX ADMIN — CHLORHEXIDINE GLUCONATE 15 ML: 1.2 RINSE ORAL at 21:52

## 2023-09-09 RX ADMIN — ALBUMIN (HUMAN) 25 G: 0.25 INJECTION, SOLUTION INTRAVENOUS at 18:07

## 2023-09-09 RX ADMIN — Medication: at 10:58

## 2023-09-09 RX ADMIN — ALBUMIN (HUMAN) 25 G: 0.25 INJECTION, SOLUTION INTRAVENOUS at 00:45

## 2023-09-09 RX ADMIN — MIDAZOLAM 2 MG: 1 INJECTION INTRAMUSCULAR; INTRAVENOUS at 02:34

## 2023-09-09 RX ADMIN — WATER 10 ML: 1 INJECTION INTRAMUSCULAR; INTRAVENOUS; SUBCUTANEOUS at 21:59

## 2023-09-09 RX ADMIN — Medication: at 21:29

## 2023-09-09 RX ADMIN — CHLORHEXIDINE GLUCONATE 15 ML: 1.2 RINSE ORAL at 10:22

## 2023-09-09 RX ADMIN — WATER 500 MG: 1 INJECTION INTRAMUSCULAR; INTRAVENOUS; SUBCUTANEOUS at 12:00

## 2023-09-09 RX ADMIN — IPRATROPIUM BROMIDE 0.5 MG: 0.5 SOLUTION RESPIRATORY (INHALATION) at 12:55

## 2023-09-09 RX ADMIN — Medication 300 MG: at 10:26

## 2023-09-09 RX ADMIN — SODIUM BICARBONATE: 84 INJECTION, SOLUTION INTRAVENOUS at 02:31

## 2023-09-09 RX ADMIN — LEVOTHYROXINE SODIUM 25 MCG: 0.03 TABLET ORAL at 05:38

## 2023-09-09 RX ADMIN — HYDROCORTISONE SODIUM SUCCINATE 50 MG: 100 INJECTION, POWDER, FOR SOLUTION INTRAMUSCULAR; INTRAVENOUS at 21:50

## 2023-09-09 RX ADMIN — HYDROCORTISONE SODIUM SUCCINATE 50 MG: 100 INJECTION, POWDER, FOR SOLUTION INTRAMUSCULAR; INTRAVENOUS at 00:49

## 2023-09-09 RX ADMIN — Medication: at 21:28

## 2023-09-09 RX ADMIN — LEVETIRACETAM 750 MG: 100 INJECTION INTRAVENOUS at 08:41

## 2023-09-09 RX ADMIN — VANCOMYCIN HYDROCHLORIDE 750 MG: 10 INJECTION, POWDER, LYOPHILIZED, FOR SOLUTION INTRAVENOUS at 11:58

## 2023-09-09 RX ADMIN — SODIUM PHOSPHATE, MONOBASIC, MONOHYDRATE AND SODIUM PHOSPHATE, DIBASIC, ANHYDROUS 12 MMOL: 276; 142 INJECTION, SOLUTION INTRAVENOUS at 01:30

## 2023-09-09 RX ADMIN — ARFORMOTEROL TARTRATE 15 MCG: 15 SOLUTION RESPIRATORY (INHALATION) at 21:10

## 2023-09-09 RX ADMIN — ALBUMIN (HUMAN) 25 G: 0.25 INJECTION, SOLUTION INTRAVENOUS at 08:36

## 2023-09-09 RX ADMIN — SODIUM PHOSPHATE, MONOBASIC, MONOHYDRATE AND SODIUM PHOSPHATE, DIBASIC, ANHYDROUS 6 MMOL: 276; 142 INJECTION, SOLUTION INTRAVENOUS at 17:21

## 2023-09-09 RX ADMIN — IPRATROPIUM BROMIDE 0.5 MG: 0.5 SOLUTION RESPIRATORY (INHALATION) at 10:04

## 2023-09-09 RX ADMIN — MEROPENEM 500 MG: 500 INJECTION, POWDER, FOR SOLUTION INTRAVENOUS at 13:23

## 2023-09-09 RX ADMIN — LACOSAMIDE 75 MG: 10 INJECTION INTRAVENOUS at 20:30

## 2023-09-09 RX ADMIN — ACETYLCYSTEINE 4540 MG: 200 INJECTION, SOLUTION INTRAVENOUS at 11:31

## 2023-09-09 RX ADMIN — LACOSAMIDE 75 MG: 10 INJECTION INTRAVENOUS at 11:25

## 2023-09-09 RX ADMIN — MEROPENEM 500 MG: 500 INJECTION, POWDER, FOR SOLUTION INTRAVENOUS at 04:51

## 2023-09-09 RX ADMIN — ARFORMOTEROL TARTRATE 15 MCG: 15 SOLUTION RESPIRATORY (INHALATION) at 10:04

## 2023-09-09 RX ADMIN — CALCIUM CHLORIDE 8000 MG: 100 INJECTION INTRAVENOUS; INTRAVENTRICULAR at 13:35

## 2023-09-09 RX ADMIN — CALCIUM GLUCONATE 1000 MG: 10 INJECTION, SOLUTION INTRAVENOUS at 00:58

## 2023-09-09 RX ADMIN — HYDROCORTISONE SODIUM SUCCINATE 50 MG: 100 INJECTION, POWDER, FOR SOLUTION INTRAMUSCULAR; INTRAVENOUS at 15:30

## 2023-09-09 ASSESSMENT — PULMONARY FUNCTION TESTS
PIF_VALUE: 25
PIF_VALUE: 19
PIF_VALUE: 23
PIF_VALUE: 20
PIF_VALUE: 19
PIF_VALUE: 21
PIF_VALUE: 19
PIF_VALUE: 17
PIF_VALUE: 21
PIF_VALUE: 19
PIF_VALUE: 25
PIF_VALUE: 22
PIF_VALUE: 24
PIF_VALUE: 24
PIF_VALUE: 17
PIF_VALUE: 22
PIF_VALUE: 22
PIF_VALUE: 18
PIF_VALUE: 23
PIF_VALUE: 18
PIF_VALUE: 20
PIF_VALUE: 19
PIF_VALUE: 19
PIF_VALUE: 40
PIF_VALUE: 18
PIF_VALUE: 17
PIF_VALUE: 20
PIF_VALUE: 25
PIF_VALUE: 19
PIF_VALUE: 21
PIF_VALUE: 34
PIF_VALUE: 24
PIF_VALUE: 19
PIF_VALUE: 20

## 2023-09-09 ASSESSMENT — PAIN SCALES - GENERAL
PAINLEVEL_OUTOF10: 0
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
## 2023-09-09 NOTE — PROGRESS NOTES
[facility]  ATTENDING PHYSICIAN PROGRESS NOTE      I personally saw, examined and provided care for the patient. Radiographs, labs and medications were reviewed by me independently. The case was discussed in detail and plans for care were established. Review of Residents documentation was conducted and revisions were made as appropriate. I agree with the above documented exam, problem list and plan of care. The following summarizes my clinical findings and independent assessment. CC: V. tach and in shock    S. Patient remains intubated. Patient remains on Levophed drip at 6 mics per minute    O. Intubated  Appears comfortable on vent  Heart regular rate rhythm  Lungs are clear bilaterally  Abdomen soft nontender nondistended       ASSESSMENT:  Principal Problem:    Closed right hip fracture, initial encounter ([address])  Active Problems:    Hx of blood clots    Heart failure (HCC)    Atrial fibrillation (HCC)    VT (ventricular tachycardia) (HCC)    Closed fracture of right hip (HCC)    Calculus of gallbladder without cholecystitis without obstruction  Resolved Problems:    * No resolved hospital problems.  *       PLAN:  LABS:  -I have personally reviewed the patient's labs   CBC:   Lab Results   Component Value Date/Time    WBC 10.7 09/09/2023 04:07 AM    RBC 3.18 09/09/2023 04:07 AM    HGB 9.4 09/09/2023 04:07 AM    HCT 28.2 09/09/2023 04:07 AM    MCV 88.7 09/09/2023 04:07 AM    MCH 29.6 09/09/2023 04:07 AM    MCHC 33.3 09/09/2023 04:07 AM    RDW 13.9 09/09/2023 04:07 AM    PLT 95 09/09/2023 04:07 AM    MPV 12.8 09/09/2023 04:07 AM     CMP:    Lab Results   Component Value Date/Time     09/09/2023 04:07 AM    K 3.8 09/09/2023 04:07 AM    K 4.7 06/21/2021 12:55 PM     09/09/2023 04:07 AM    CO2 25 09/09/2023 04:07 AM    BUN 28 09/09/2023 04:07 AM    CREATININE 1.4 09/09/2023 04:07 AM    GFRAA >60 06/24/2021 06:53 AM    LABGLOM 39 09/09/2023 04:07 AM    GLUCOSE 175 09/09/2023 04:07 AM    PROT 4.8 09/09/2023 04:07 AM    LABALBU 3.4 09/09/2023 04:07 AM    CALCIUM 7.6 09/09/2023 04:07 AM    BILITOT 1.4 09/09/2023 04:07 AM    ALKPHOS 57 09/09/2023 04:07 AM    AST 4,396 09/09/2023 04:07 AM     09/09/2023 04:07 AM       -I have reviewed the progress note from  cardilogy    -Acute myocardial injury after IM nail for hip fracture on 9/6  Decrease heart function on echo from 9/1 to 9/8  Currently on Levophed drip. Defer to critical care    - Incidental finding of gallbladder wall thickening  No indication for gallbladder removal at this time  Elevated LFTs secondary to shock liver from hypoperfusion of liver from myocardial injury  - Okay to initiate tube feeds from general surgery standpoint  - We will sign off            Ted Hodges MD, FACS  9/9/2023  10:32 AM    NOTE: This report was transcribed using voice recognition software. Every effort was made to ensure accuracy; however, inadvertent computerized transcription errors may be present.

## 2023-09-09 NOTE — PROGRESS NOTES
Pharmacy Consultation Note  (Antibiotic Dosing and Monitoring)    Initial consult date: 9/7/23  Consulting physician/provider: Dr. Kim Velazquez  Drug: Vancomycin  Indication: HAP; 7 days    Age/  Gender Height Weight IBW  Allergy Information   83 y.o./female 5' 1\" (154.9 cm) 100 lb (45.4 kg)     Ideal body weight: 52.1 kg (114 lb 15 oz)   Iodides, Naproxen, Doxycycline, and Iodine      Renal Function:  Recent Labs     09/07/23  2333 09/08/23  0405 09/09/23  0407   BUN 46* 43* 28*   CREATININE 1.8* 1.8* 1.4*         Intake/Output Summary (Last 24 hours) at 9/9/2023 0808  Last data filed at 9/9/2023 0700  Gross per 24 hour   Intake 9833.1 ml   Output 3439 ml   Net 6394.1 ml         Vancomycin Monitoring:  Trough:  No results for input(s): \"VANCOTROUGH\" in the last 72 hours. Random:    Recent Labs     09/08/23  0405   VANCORANDOM 13.1         No results for input(s): \"BLOODCULT2\" in the last 72 hours. Historical Cultures:  Organism   Date Value Ref Range Status   07/25/2017 Gram-positive cocci (A)  Final     No results for input(s): \"BC\" in the last 72 hours. Vancomycin Administration Times:  Recent vancomycin administrations        No vancomycin IV orders with administrations found. Assessment:  Patient is a 80 y.o. female who has been initiated on vancomycin  Estimated Creatinine Clearance: 22 mL/min (A) (based on SCr of 1.4 mg/dL (H)).   CVV Initiated    Plan:  Vancomycin 750 mg IV q24h while on CVV    Thank you for the consult,  Billy Blood, PharmD, BCPS, BCCCP 9/9/2023 8:08 AM

## 2023-09-09 NOTE — PROCEDURES
( ) Hemodialysis Catheter Procedure       9/9/23   Time: 12:31 PM    KIT Used: A double lumen catheter  Number of Kits used: 1    Site: internal jugular vein    Pre-procedure Dx & Indications: HD/CRRT    Informed consent: Written consent obtained. The legal guardian was counseled regarding the procedure via phone (confirmed by third party present), it's indications, risks, potential complications and alternatives and any questions were answered. Consent was obtained. .    Risks, benefits and alternatives described and explained to patient and or POA Or family. Valentine Pinch Performed By: MD Azael Garcia MD.    Indication: HD     Ultrasound Guidance:   used. Sterile barriers: all five maximum sterile barriers used - cap, mask, sterile gown, sterile gloves, and large sterile sheet. Anesthesia: Local infiltration of 1% lidocaine. Patient is sedated with propofol. Patient position: flat     Procedure: After routine sterile preparation, local anesthesia obtained by infiltration using 1% Lidocaine without epinephrine. A right 2-Lumen 14F Hemodialysis Catheter was placed by internal jugular vein approach and secured by standard fashion. Technique: Procedure was done using strict aseptic technique. The patient's Right neck was prepped and draped in the usual sterile fashion. The Right neck was palpated for a carotid pulsation, 1% lidocaine injected medially. Using the Seldinger technique, a large-bore needle was placed into the femoral vein with good backflow. A guidewire was placed. Then a small incision was made in the patient's skin. The large-bore needle was removed. Dilators were passed over the guidewire. Then, HD catheter was placed over the guidewire which was subsequently removed. Both ports were drawn and flushed with good flow. The catheter was sutured in place, and a sterile dressing was placed.      Pull back amount: 1.0 cm    Secured by: with sutures    Number of Attempts:

## 2023-09-09 NOTE — PROCEDURES
(  ) Central Line Insertion Procedure              9/9/23   Time: 12:30 PM    KIT Used: A triple lumen catheter  Number of Kits used: 1    Site: internal jugular vein    Pre-procedure Dx & Indications: vascular access    Informed consent: Consent unable to be obtained due to the emergent nature of this procedure. .    Risks, benefits and alternatives described and explained to patient and or POA Or family. Apolonio Fothergill Performed By: MD Lj Velazquez MD.    Indication: vascular access and inability to gain peripheral access. Ultrasound Guidance:   used. Sterile barriers: all five maximum sterile barriers used - cap, mask, sterile gown, sterile gloves, and large sterile sheet. Anesthesia: Local infiltration of 1% lidocaine. Patient is sedated with propofol. Patient position: flat     Procedure: After routine sterile preparation, local anesthesia obtained by infiltration using 1% Lidocaine without epinephrine. A left 3-Lumen 7F Central Venous Catheter was placed by internal jugular vein approach and secured by standard fashion. Technique: Procedure was done using strict aseptic technique. The patient's Left neck was prepped and draped in the usual sterile fashion. The Left neck was palpated for a carotid pulsation, 1% lidocaine injected medially. Using the Seldinger technique, a large-bore needle was placed into the femoral vein with good backflow. A guidewire was placed. Then a small incision was made in the patient's skin. The large-bore needle was removed. Dilators were passed over the guidewire. Then, central line catheter was placed over the guidewire which was subsequently removed. Both ports were drawn and flushed with good flow. The catheter was sutured in place, and a sterile dressing was placed. Pull back amount: 1.0 cm    Secured by: with sutures    Number of Attempts:  One    Post-procedure Findings: A post procedural chest x-ray  was ordered and showed no evidence of pneumothorax. Patient tolerated the procedure well. Complications: None     Estimated blood loss: About 2 ml.      Kelsy Devries MD    9/9/23   Time: 12:30 PM

## 2023-09-09 NOTE — PROGRESS NOTES
PROGRESS NOTE       PATIENT PROBLEM LIST:  Patient Active Problem List   Diagnosis Code    Acute GI hemorrhage K92.2    Acute renal failure ([facility]) N17.9    Anticoagulated Z79.01    Lactic acidosis E87.20    Dyspnea on exertion R06.09    Pulmonary fibrosis ([facility]) J84.10    Pulmonary emphysema ([facility]) J43.9    Dyspnea R06.00    Atrial fibrillation with rapid ventricular response ([facility]) I48.91    CHF (congestive heart failure), NYHA class I, acute on chronic, combined ([address]) I50.43    Near syncope R55    Thoracic compression fracture, closed, initial encounter ([address]) S22.000A    Compression fracture of body of thoracic vertebra ([address]) S22.000A    Compression fracture of thoracic spine, non-traumatic, initial encounter ([address]) M48.54XA    Acute on chronic heart failure, unspecified heart failure type ([address]) I50.9    Closed right hip fracture, initial encounter ([address]) S72.001A    Hx of blood clots Z86.718    Heart failure ([address]) I50.9    Atrial fibrillation ([facility]) I48.91    VT (ventricular tachycardia) ([address]) I47.20    Closed fracture of right hip ([address]) S72.001A    Calculus of gallbladder without cholecystitis without obstruction K80.20       SUBJECTIVE:  Taylor Zepeda remains intubated but able to open her eyes and attempts to interact. REVIEW OF SYSTEMS:  Unable to accurately assess secondary to patient's critical status. Jamel November MEDICATIONS:   meropenem  500 mg IntraVENous Q8H    famotidine (PEPCID) injection  10 mg IntraVENous Daily    vancomycin  750 mg IntraVENous Q24H    lacosamide (VIMPAT) 75 mg in sodium chloride 0.9 % 57.5 mL IVPB  75 mg IntraVENous BID    ferrous sulfate  300 mg Oral Daily with breakfast    hydrocortisone sodium succinate PF  50 mg IntraVENous Q6H    levothyroxine  25 mcg Oral Daily    thiamine  500 mg IntraVENous Daily    albumin human 25%  25 g IntraVENous Q8H    acetylcysteine (ACETADOTE) 4,540 mg in dextrose 5 % 1,022.7 mL infusion  100 mg/kg IntraVENous Once    insulin lispro  0-8 Units

## 2023-09-09 NOTE — PLAN OF CARE
Problem: Pain  Goal: Verbalizes/displays adequate comfort level or baseline comfort level  9/9/2023 1852 by Brent Blake RN  Outcome: Progressing     Problem: Chronic Conditions and Co-morbidities  Goal: Patient's chronic conditions and co-morbidity symptoms are monitored and maintained or improved  9/9/2023 1852 by Brent Blake RN  Outcome: Progressing     Problem: Discharge Planning  Goal: Discharge to home or other facility with appropriate resources  9/9/2023 1852 by Brent Blake RN  Outcome: Progressing     Problem: Safety - Adult  Goal: Free from fall injury  9/9/2023 1852 by Brent Blake RN  Outcome: Progressing     Problem: Skin/Tissue Integrity  Goal: Absence of new skin breakdown  Description: 1. Monitor for areas of redness and/or skin breakdown  2. Assess vascular access sites hourly  3. Every 4-6 hours minimum:  Change oxygen saturation probe site  4. Every 4-6 hours:  If on nasal continuous positive airway pressure, respiratory therapy assess nares and determine need for appliance change or resting period. 9/9/2023 1852 by Brent Blake RN  Outcome: Progressing     Problem: ABCDS Injury Assessment  Goal: Absence of physical injury  9/9/2023 1852 by Brent Blake RN  Outcome: Progressing     Problem: Safety - Medical Restraint  Goal: Remains free of injury from restraints (Restraint for Interference with Medical Device)  Description: INTERVENTIONS:  1. Determine that other, less restrictive measures have been tried or would not be effective before applying the restraint  2. Evaluate the patient's condition at the time of restraint application  3. Inform patient/family regarding the reason for restraint  4.  Q2H: Monitor safety, psychosocial status, comfort, nutrition and hydration  9/9/2023 1852 by Brent Blake RN  Outcome: Progressing  Flowsheets (Taken 9/9/2023 0600 by Sadaf Gabriel RN)  Remains free of injury from restraints (restraint for interference with medical device): Every 2 hours: Monitor safety, psychosocial status, comfort, nutrition and hydration     Problem: Respiratory - Adult  Goal: Achieves optimal ventilation and oxygenation  9/9/2023 1852 by Ken Negron RN  Outcome: Progressing     Problem: Discharge Planning  Goal: Discharge to home or other facility with appropriate resources  9/9/2023 1852 by Ken Negron RN  Outcome: Progressing  9/9/2023 0512 by Herlinda Erickson RN  Outcome: Not Progressing

## 2023-09-09 NOTE — PROGRESS NOTES
DAILY VENTILATOR WEANING ASSESSMENT PERFORMED    P/FIO2 Ratio =   250       (<100= do not Wean)                  Cs =      41                    (<32= Instability)  Plat. Pressure = 18  MV =6.31  RSBI =    Instabilities:       Cardiovascular =1       CNS =       Respiratory =       Metabolic =    Parameters    no    Wean per protocol  no    Ask Physician for a weaning plan yes    Additional Comments:     Performed by Steph Damon RCP RRT      Reference Table:    Cardiovascular     CNS      1. Mean BP less than or equal to 75   1. Neuromuscular blockade  2. Heart Rate greater than 130   2. RASS of -3, -4, -5  3. Myocardial Ischemia    3. RASS of +3, +4  4. Mechanical Assist Device    4. ICP greater than 15 or             Intracranial Hypertension         Respiratory      Metabolic  1. PEEP equal to or greater than 10cm/H20  1. Temp. (8hrs) less than 95 or > 103  2. Respiratory Rate greater than 35   2. WBC < 5000 or > 71652  3. Minute Volume greater than 15L  4. pH less than 7.30  5.  Deteriorating chest X-ray
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
## 2023-09-09 NOTE — PLAN OF CARE
Problem: Pain  Goal: Verbalizes/displays adequate comfort level or baseline comfort level  Outcome: Progressing  Flowsheets  Taken 9/9/2023 0000 by Mindi Fowler RN  Verbalizes/displays adequate comfort level or baseline comfort level: Assess pain using appropriate pain scale  Taken 9/8/2023 2200 by Mindi Fowler RN  Verbalizes/displays adequate comfort level or baseline comfort level: Assess pain using appropriate pain scale  Taken 9/8/2023 2000 by Mindi Fowler RN  Verbalizes/displays adequate comfort level or baseline comfort level: Assess pain using appropriate pain scale  Taken 9/8/2023 1800 by Steffany Posadas RN  Verbalizes/displays adequate comfort level or baseline comfort level:   Encourage patient to monitor pain and request assistance   Assess pain using appropriate pain scale   Administer analgesics based on type and severity of pain and evaluate response  Taken 9/8/2023 1600 by Steffany Posadas RN  Verbalizes/displays adequate comfort level or baseline comfort level:   Assess pain using appropriate pain scale   Administer analgesics based on type and severity of pain and evaluate response     Problem: Chronic Conditions and Co-morbidities  Goal: Patient's chronic conditions and co-morbidity symptoms are monitored and maintained or improved  Outcome: Progressing     Problem: Safety - Adult  Goal: Free from fall injury  Outcome: Progressing     Problem: Skin/Tissue Integrity  Goal: Absence of new skin breakdown  Description: 1. Monitor for areas of redness and/or skin breakdown  2. Assess vascular access sites hourly  3. Every 4-6 hours minimum:  Change oxygen saturation probe site  4. Every 4-6 hours:  If on nasal continuous positive airway pressure, respiratory therapy assess nares and determine need for appliance change or resting period.   Outcome: Progressing     Problem: ABCDS Injury Assessment  Goal: Absence of physical injury  Outcome: Progressing     Problem: Safety - Medical

## 2023-09-09 NOTE — PROGRESS NOTES
[address] and [facility] Electrophysiology  Inpatient Progress Report  PATIENT: Ajay Brambila  MEDICAL RECORD NUMBER: [id number]  DATE OF SERVICE:  9/9/2023  ATTENDING ELECTROPHYSIOLOGIST: Bryson Lovelace MD   PRIMARY ELECTROPHYSIOLOGIST: Bryson Lovelace MD  REFERRING PHYSICIAN: Kaycee Velasquez MD  CHIEF COMPLAINT: Fall and right hip pain    HPI: This is a 80 y.o. female with a history of   Patient Active Problem List   Diagnosis    Acute GI hemorrhage    Acute renal failure (HCC)    Anticoagulated    Lactic acidosis    Dyspnea on exertion    Pulmonary fibrosis (HCC)    Pulmonary emphysema (HCC)    Dyspnea    Atrial fibrillation with rapid ventricular response (HCC)    CHF (congestive heart failure), NYHA class I, acute on chronic, combined ([address])    Near syncope    Thoracic compression fracture, closed, initial encounter ([address])    Compression fracture of body of thoracic vertebra ([address])    Compression fracture of thoracic spine, non-traumatic, initial encounter ([address])    Acute on chronic heart failure, unspecified heart failure type ([address])    Closed right hip fracture, initial encounter ([address])    Hx of blood clots    Heart failure ([address])    Atrial fibrillation ([address])    VT (ventricular tachycardia) ([address])    Closed fracture of right hip ([address])    Calculus of gallbladder without cholecystitis without obstruction   who presented to the hospital on 9/7/23 complaining of fall and right hip pain. The patient has history of  long standing persistent versus permanent atrial fibrillation, acute on chronic HFmrEF with LV EF of 45%, moderate MR/TR, hypertension, hyperlipidemia, COPD/emphysema and thoracic compression fracture in June 2022. The patient presented to the hospital on 8/29/23 due to shortness of breath and was seen by Cardiology and Pulmonary. She was diagnosed with acute on chronic heart failure and was treated with diuretics.  She was discharged home on Toprol discontinued this AM due to bradycardia per bedside nurse. I reviewed telemetry monitor and no sustained significant bradycardia noted. Physical exam showed no JVD, RRR, normal S1S2, no murmur, decreased breath sound at bases bilaterally, no edema    Decrease Lidocaine to 1 mg/min. Will hold off oral Amiodarone until liver function improved. Keep potassium and magnesium at high normal levels. I have spent a total of 35 CCT minutes with the patient and the family reviewing the above stated recommendations. And a total of >50% of that time involved face-to-face time providing counseling and/or coordination of care with the other providers, reviewing records/tests, counseling/education of the patient, ordering medications/tests/procedures, coordinating care, and documenting clinical information in the EHR.      Carmen Reinoso MD  Cardiac Electrophysiology  [address] [facility]  [facility] [place]: [facility] Electrophysiology  10:37 AM  9/9/2023

## 2023-09-10 ENCOUNTER — APPOINTMENT (OUTPATIENT)
Dept: GENERAL RADIOLOGY | Age: 83
End: 2023-09-10
Payer: MEDICARE

## 2023-09-10 LAB
AADO2: 94.1 MMHG
ALBUMIN SERPL-MCNC: 3.9 G/DL (ref 3.5–5.2)
ALBUMIN SERPL-MCNC: 4.1 G/DL (ref 3.5–5.2)
ALBUMIN SERPL-MCNC: 4.1 G/DL (ref 3.5–5.2)
ALBUMIN SERPL-MCNC: 4.2 G/DL (ref 3.5–5.2)
ALP SERPL-CCNC: 63 U/L (ref 35–104)
ALP SERPL-CCNC: 63 U/L (ref 35–104)
ALP SERPL-CCNC: 74 U/L (ref 35–104)
ALP SERPL-CCNC: 74 U/L (ref 35–104)
ALT SERPL-CCNC: 206 U/L (ref 0–32)
ALT SERPL-CCNC: 250 U/L (ref 0–32)
ALT SERPL-CCNC: 289 U/L (ref 0–32)
ALT SERPL-CCNC: 334 U/L (ref 0–32)
ANION GAP SERPL CALCULATED.3IONS-SCNC: 10 MMOL/L (ref 7–16)
ANION GAP SERPL CALCULATED.3IONS-SCNC: 11 MMOL/L (ref 7–16)
AST SERPL-CCNC: 1066 U/L (ref 0–31)
AST SERPL-CCNC: 1231 U/L (ref 0–31)
AST SERPL-CCNC: 1431 U/L (ref 0–31)
AST SERPL-CCNC: 819 U/L (ref 0–31)
B.E.: 1.5 MMOL/L (ref -3–3)
BASOPHILS # BLD: 0.01 K/UL (ref 0–0.2)
BASOPHILS NFR BLD: 0 % (ref 0–2)
BILIRUB SERPL-MCNC: 1.5 MG/DL (ref 0–1.2)
BILIRUB SERPL-MCNC: 1.6 MG/DL (ref 0–1.2)
BILIRUB SERPL-MCNC: 1.7 MG/DL (ref 0–1.2)
BILIRUB SERPL-MCNC: 1.8 MG/DL (ref 0–1.2)
BUN SERPL-MCNC: 13 MG/DL (ref 6–23)
BUN SERPL-MCNC: 13 MG/DL (ref 6–23)
BUN SERPL-MCNC: 14 MG/DL (ref 6–23)
BUN SERPL-MCNC: 14 MG/DL (ref 6–23)
CA-I BLD-SCNC: 1.22 MMOL/L (ref 1.15–1.33)
CA-I BLD-SCNC: 1.23 MMOL/L (ref 1.15–1.33)
CA-I BLD-SCNC: 1.27 MMOL/L (ref 1.15–1.33)
CA-I BLD-SCNC: 1.35 MMOL/L (ref 1.15–1.33)
CALCIUM SERPL-MCNC: 10.3 MG/DL (ref 8.6–10.2)
CALCIUM SERPL-MCNC: 9.1 MG/DL (ref 8.6–10.2)
CALCIUM SERPL-MCNC: 9.4 MG/DL (ref 8.6–10.2)
CALCIUM SERPL-MCNC: 9.7 MG/DL (ref 8.6–10.2)
CHLORIDE SERPL-SCNC: 103 MMOL/L (ref 98–107)
CHLORIDE SERPL-SCNC: 104 MMOL/L (ref 98–107)
CO2 SERPL-SCNC: 21 MMOL/L (ref 22–29)
CO2 SERPL-SCNC: 22 MMOL/L (ref 22–29)
CO2 SERPL-SCNC: 23 MMOL/L (ref 22–29)
CO2 SERPL-SCNC: 23 MMOL/L (ref 22–29)
COHB: 0.2 % (ref 0–1.5)
CREAT SERPL-MCNC: 0.9 MG/DL (ref 0.5–1)
CREAT SERPL-MCNC: 0.9 MG/DL (ref 0.5–1)
CREAT SERPL-MCNC: 1 MG/DL (ref 0.5–1)
CREAT SERPL-MCNC: 1 MG/DL (ref 0.5–1)
CRITICAL: ABNORMAL
DATE ANALYZED: ABNORMAL
DATE OF COLLECTION: ABNORMAL
EOSINOPHIL # BLD: 0 K/UL (ref 0.05–0.5)
EOSINOPHILS RELATIVE PERCENT: 0 % (ref 0–6)
ERYTHROCYTE [DISTWIDTH] IN BLOOD BY AUTOMATED COUNT: 14.6 % (ref 11.5–15)
FIO2: 40 %
GFR SERPL CREATININE-BSD FRML MDRD: 55 ML/MIN/1.73M2
GFR SERPL CREATININE-BSD FRML MDRD: 57 ML/MIN/1.73M2
GFR SERPL CREATININE-BSD FRML MDRD: >60 ML/MIN/1.73M2
GFR SERPL CREATININE-BSD FRML MDRD: >60 ML/MIN/1.73M2
GLUCOSE BLD-MCNC: 103 MG/DL (ref 74–99)
GLUCOSE BLD-MCNC: 103 MG/DL (ref 74–99)
GLUCOSE BLD-MCNC: 113 MG/DL (ref 74–99)
GLUCOSE BLD-MCNC: 115 MG/DL (ref 74–99)
GLUCOSE BLD-MCNC: 87 MG/DL (ref 74–99)
GLUCOSE SERPL-MCNC: 102 MG/DL (ref 74–99)
GLUCOSE SERPL-MCNC: 102 MG/DL (ref 74–99)
GLUCOSE SERPL-MCNC: 111 MG/DL (ref 74–99)
GLUCOSE SERPL-MCNC: 117 MG/DL (ref 74–99)
HCO3: 25.2 MMOL/L (ref 22–26)
HCT VFR BLD AUTO: 25.4 % (ref 34–48)
HGB BLD-MCNC: 8 G/DL (ref 11.5–15.5)
HHB: 1.4 % (ref 0–5)
IMM GRANULOCYTES # BLD AUTO: 0.1 K/UL (ref 0–0.58)
IMM GRANULOCYTES NFR BLD: 1 % (ref 0–5)
INR PPP: 2.1
LAB: ABNORMAL
LACTATE BLDV-SCNC: 2.3 MMOL/L (ref 0.5–2.2)
LACTATE BLDV-SCNC: 2.4 MMOL/L (ref 0.5–2.2)
LACTATE BLDV-SCNC: 2.7 MMOL/L (ref 0.5–2.2)
LACTATE BLDV-SCNC: 2.9 MMOL/L (ref 0.5–2.2)
LYMPHOCYTES NFR BLD: 0.59 K/UL (ref 1.5–4)
LYMPHOCYTES RELATIVE PERCENT: 6 % (ref 20–42)
Lab: ABNORMAL
MAGNESIUM SERPL-MCNC: 1.8 MG/DL (ref 1.6–2.6)
MAGNESIUM SERPL-MCNC: 1.9 MG/DL (ref 1.6–2.6)
MAGNESIUM SERPL-MCNC: 1.9 MG/DL (ref 1.6–2.6)
MAGNESIUM SERPL-MCNC: 2 MG/DL (ref 1.6–2.6)
MCH RBC QN AUTO: 29.7 PG (ref 26–35)
MCHC RBC AUTO-ENTMCNC: 31.5 G/DL (ref 32–34.5)
MCV RBC AUTO: 94.4 FL (ref 80–99.9)
METHB: 0.4 % (ref 0–1.5)
MODE: AC
MONOCYTES NFR BLD: 0.5 K/UL (ref 0.1–0.95)
MONOCYTES NFR BLD: 5 % (ref 2–12)
NEUTROPHILS NFR BLD: 88 % (ref 43–80)
NEUTS SEG NFR BLD: 9.22 K/UL (ref 1.8–7.3)
O2 CONTENT: 12.8 ML/DL
O2 SATURATION: 98.6 % (ref 92–98.5)
O2HB: 98 % (ref 94–97)
OPERATOR ID: 3342
PARTIAL THROMBOPLASTIN TIME: 43.3 SEC (ref 24.5–35.1)
PATIENT TEMP: 37 C
PCO2: 35.7 MMHG (ref 35–45)
PEEP/CPAP: 5 CMH2O
PFO2: 3.5 MMHG/%
PH BLOOD GAS: 7.47 (ref 7.35–7.45)
PHOSPHATE SERPL-MCNC: 2.3 MG/DL (ref 2.5–4.5)
PHOSPHATE SERPL-MCNC: 2.3 MG/DL (ref 2.5–4.5)
PHOSPHATE SERPL-MCNC: 2.5 MG/DL (ref 2.5–4.5)
PHOSPHATE SERPL-MCNC: 2.6 MG/DL (ref 2.5–4.5)
PLATELET # BLD AUTO: 74 K/UL (ref 130–450)
PLATELET CONFIRMATION: NORMAL
PMV BLD AUTO: 12.8 FL (ref 7–12)
PO2: 140 MMHG (ref 75–100)
POTASSIUM SERPL-SCNC: 3.9 MMOL/L (ref 3.5–5)
POTASSIUM SERPL-SCNC: 4.2 MMOL/L (ref 3.5–5)
POTASSIUM SERPL-SCNC: 4.3 MMOL/L (ref 3.5–5)
POTASSIUM SERPL-SCNC: 4.4 MMOL/L (ref 3.5–5)
PROT SERPL-MCNC: 5.2 G/DL (ref 6.4–8.3)
PROT SERPL-MCNC: 5.2 G/DL (ref 6.4–8.3)
PROT SERPL-MCNC: 5.4 G/DL (ref 6.4–8.3)
PROT SERPL-MCNC: 5.5 G/DL (ref 6.4–8.3)
PROTHROMBIN TIME: 23.2 SEC (ref 9.3–12.4)
RBC # BLD AUTO: 2.69 M/UL (ref 3.5–5.5)
RBC # BLD: ABNORMAL 10*6/UL
RBC # BLD: ABNORMAL 10*6/UL
RI(T): 0.67
RR MECHANICAL: 16 B/MIN
SODIUM SERPL-SCNC: 135 MMOL/L (ref 132–146)
SODIUM SERPL-SCNC: 136 MMOL/L (ref 132–146)
SODIUM SERPL-SCNC: 137 MMOL/L (ref 132–146)
SODIUM SERPL-SCNC: 137 MMOL/L (ref 132–146)
SOURCE, BLOOD GAS: ABNORMAL
THB: 9.1 G/DL (ref 11.5–16.5)
TIME ANALYZED: 549
VT MECHANICAL: 400 ML
WBC OTHER # BLD: 10.4 K/UL (ref 4.5–11.5)

## 2023-09-10 PROCEDURE — 99232 SBSQ HOSP IP/OBS MODERATE 35: CPT | Performed by: PHYSICIAN ASSISTANT

## 2023-09-10 PROCEDURE — 83735 ASSAY OF MAGNESIUM: CPT

## 2023-09-10 PROCEDURE — 6360000002 HC RX W HCPCS: Performed by: INTERNAL MEDICINE

## 2023-09-10 PROCEDURE — 99291 CRITICAL CARE FIRST HOUR: CPT | Performed by: INTERNAL MEDICINE

## 2023-09-10 PROCEDURE — 6370000000 HC RX 637 (ALT 250 FOR IP): Performed by: NURSE PRACTITIONER

## 2023-09-10 PROCEDURE — 6360000002 HC RX W HCPCS: Performed by: NURSE PRACTITIONER

## 2023-09-10 PROCEDURE — P9047 ALBUMIN (HUMAN), 25%, 50ML: HCPCS | Performed by: INTERNAL MEDICINE

## 2023-09-10 PROCEDURE — 94640 AIRWAY INHALATION TREATMENT: CPT

## 2023-09-10 PROCEDURE — 2580000003 HC RX 258: Performed by: NURSE PRACTITIONER

## 2023-09-10 PROCEDURE — 2580000003 HC RX 258: Performed by: INTERNAL MEDICINE

## 2023-09-10 PROCEDURE — 85730 THROMBOPLASTIN TIME PARTIAL: CPT

## 2023-09-10 PROCEDURE — 85610 PROTHROMBIN TIME: CPT

## 2023-09-10 PROCEDURE — 84100 ASSAY OF PHOSPHORUS: CPT

## 2023-09-10 PROCEDURE — 82805 BLOOD GASES W/O2 SATURATION: CPT

## 2023-09-10 PROCEDURE — 2500000003 HC RX 250 WO HCPCS: Performed by: INTERNAL MEDICINE

## 2023-09-10 PROCEDURE — 6360000002 HC RX W HCPCS: Performed by: PHYSICIAN ASSISTANT

## 2023-09-10 PROCEDURE — 2580000003 HC RX 258: Performed by: PHYSICIAN ASSISTANT

## 2023-09-10 PROCEDURE — 90945 DIALYSIS ONE EVALUATION: CPT

## 2023-09-10 PROCEDURE — 6360000002 HC RX W HCPCS: Performed by: STUDENT IN AN ORGANIZED HEALTH CARE EDUCATION/TRAINING PROGRAM

## 2023-09-10 PROCEDURE — 2000000000 HC ICU R&B

## 2023-09-10 PROCEDURE — C9113 INJ PANTOPRAZOLE SODIUM, VIA: HCPCS | Performed by: NURSE PRACTITIONER

## 2023-09-10 PROCEDURE — 80053 COMPREHEN METABOLIC PANEL: CPT

## 2023-09-10 PROCEDURE — 82330 ASSAY OF CALCIUM: CPT

## 2023-09-10 PROCEDURE — 6370000000 HC RX 637 (ALT 250 FOR IP): Performed by: STUDENT IN AN ORGANIZED HEALTH CARE EDUCATION/TRAINING PROGRAM

## 2023-09-10 PROCEDURE — 71045 X-RAY EXAM CHEST 1 VIEW: CPT

## 2023-09-10 PROCEDURE — C9254 INJECTION, LACOSAMIDE: HCPCS | Performed by: PHYSICIAN ASSISTANT

## 2023-09-10 PROCEDURE — 6370000000 HC RX 637 (ALT 250 FOR IP): Performed by: INTERNAL MEDICINE

## 2023-09-10 PROCEDURE — 82962 GLUCOSE BLOOD TEST: CPT

## 2023-09-10 PROCEDURE — 83605 ASSAY OF LACTIC ACID: CPT

## 2023-09-10 PROCEDURE — 6370000000 HC RX 637 (ALT 250 FOR IP): Performed by: FAMILY MEDICINE

## 2023-09-10 PROCEDURE — 85025 COMPLETE CBC W/AUTO DIFF WBC: CPT

## 2023-09-10 PROCEDURE — 94003 VENT MGMT INPAT SUBQ DAY: CPT

## 2023-09-10 PROCEDURE — 2580000003 HC RX 258: Performed by: STUDENT IN AN ORGANIZED HEALTH CARE EDUCATION/TRAINING PROGRAM

## 2023-09-10 PROCEDURE — 37799 UNLISTED PX VASCULAR SURGERY: CPT

## 2023-09-10 RX ORDER — MIDODRINE HYDROCHLORIDE 5 MG/1
5 TABLET ORAL
Status: DISCONTINUED | OUTPATIENT
Start: 2023-09-10 | End: 2023-09-11

## 2023-09-10 RX ORDER — AMIODARONE HYDROCHLORIDE 200 MG/1
200 TABLET ORAL 3 TIMES DAILY
Status: DISCONTINUED | OUTPATIENT
Start: 2023-09-10 | End: 2023-09-14 | Stop reason: HOSPADM

## 2023-09-10 RX ADMIN — SODIUM PHOSPHATE, MONOBASIC, MONOHYDRATE AND SODIUM PHOSPHATE, DIBASIC, ANHYDROUS 6 MMOL: 276; 142 INJECTION, SOLUTION INTRAVENOUS at 12:46

## 2023-09-10 RX ADMIN — LACOSAMIDE 75 MG: 10 INJECTION INTRAVENOUS at 21:52

## 2023-09-10 RX ADMIN — ALBUMIN (HUMAN) 25 G: 0.25 INJECTION, SOLUTION INTRAVENOUS at 08:26

## 2023-09-10 RX ADMIN — MIDODRINE HYDROCHLORIDE 5 MG: 5 TABLET ORAL at 14:31

## 2023-09-10 RX ADMIN — AMIODARONE HYDROCHLORIDE 200 MG: 200 TABLET ORAL at 09:34

## 2023-09-10 RX ADMIN — SODIUM CHLORIDE, PRESERVATIVE FREE 40 MG: 5 INJECTION INTRAVENOUS at 08:08

## 2023-09-10 RX ADMIN — HYDROCORTISONE SODIUM SUCCINATE 50 MG: 100 INJECTION, POWDER, FOR SOLUTION INTRAMUSCULAR; INTRAVENOUS at 08:08

## 2023-09-10 RX ADMIN — LIDOCAINE HYDROCHLORIDE 1 MG/MIN: 4 INJECTION, SOLUTION INTRAVENOUS at 16:20

## 2023-09-10 RX ADMIN — MEROPENEM 500 MG: 500 INJECTION, POWDER, FOR SOLUTION INTRAVENOUS at 14:10

## 2023-09-10 RX ADMIN — MEROPENEM 500 MG: 500 INJECTION, POWDER, FOR SOLUTION INTRAVENOUS at 22:34

## 2023-09-10 RX ADMIN — IPRATROPIUM BROMIDE 0.5 MG: 0.5 SOLUTION RESPIRATORY (INHALATION) at 20:48

## 2023-09-10 RX ADMIN — Medication: at 03:09

## 2023-09-10 RX ADMIN — Medication 10 ML: at 08:27

## 2023-09-10 RX ADMIN — CALCIUM CHLORIDE 8000 MG: 100 INJECTION INTRAVENOUS; INTRAVENTRICULAR at 04:40

## 2023-09-10 RX ADMIN — HYDROCORTISONE SODIUM SUCCINATE 50 MG: 100 INJECTION, POWDER, FOR SOLUTION INTRAMUSCULAR; INTRAVENOUS at 21:17

## 2023-09-10 RX ADMIN — HYDROCORTISONE SODIUM SUCCINATE 50 MG: 100 INJECTION, POWDER, FOR SOLUTION INTRAMUSCULAR; INTRAVENOUS at 14:31

## 2023-09-10 RX ADMIN — AMIODARONE HYDROCHLORIDE 200 MG: 200 TABLET ORAL at 21:16

## 2023-09-10 RX ADMIN — Medication: at 22:00

## 2023-09-10 RX ADMIN — OXYCODONE HYDROCHLORIDE 5 MG: 5 TABLET ORAL at 21:16

## 2023-09-10 RX ADMIN — ALBUMIN (HUMAN) 25 G: 0.25 INJECTION, SOLUTION INTRAVENOUS at 16:55

## 2023-09-10 RX ADMIN — CALCIUM CHLORIDE 8000 MG: 100 INJECTION INTRAVENOUS; INTRAVENTRICULAR at 18:52

## 2023-09-10 RX ADMIN — THIAMINE HYDROCHLORIDE 500 MG: 100 INJECTION, SOLUTION INTRAMUSCULAR; INTRAVENOUS at 08:22

## 2023-09-10 RX ADMIN — AMIODARONE HYDROCHLORIDE 200 MG: 200 TABLET ORAL at 14:31

## 2023-09-10 RX ADMIN — Medication: at 03:10

## 2023-09-10 RX ADMIN — MIDODRINE HYDROCHLORIDE 5 MG: 5 TABLET ORAL at 16:55

## 2023-09-10 RX ADMIN — VANCOMYCIN HYDROCHLORIDE 750 MG: 10 INJECTION, POWDER, LYOPHILIZED, FOR SOLUTION INTRAVENOUS at 11:07

## 2023-09-10 RX ADMIN — LACOSAMIDE 75 MG: 10 INJECTION INTRAVENOUS at 07:53

## 2023-09-10 RX ADMIN — MEROPENEM 500 MG: 500 INJECTION, POWDER, FOR SOLUTION INTRAVENOUS at 05:30

## 2023-09-10 RX ADMIN — OXYCODONE HYDROCHLORIDE 5 MG: 5 TABLET ORAL at 16:55

## 2023-09-10 RX ADMIN — CHLORHEXIDINE GLUCONATE 15 ML: 1.2 RINSE ORAL at 21:38

## 2023-09-10 RX ADMIN — CHLORHEXIDINE GLUCONATE 15 ML: 1.2 RINSE ORAL at 07:55

## 2023-09-10 RX ADMIN — LEVOTHYROXINE SODIUM 25 MCG: 0.03 TABLET ORAL at 06:53

## 2023-09-10 RX ADMIN — ALBUMIN (HUMAN) 25 G: 0.25 INJECTION, SOLUTION INTRAVENOUS at 01:57

## 2023-09-10 RX ADMIN — IPRATROPIUM BROMIDE 0.5 MG: 0.5 SOLUTION RESPIRATORY (INHALATION) at 09:16

## 2023-09-10 RX ADMIN — Medication 300 MG: at 08:19

## 2023-09-10 RX ADMIN — ARFORMOTEROL TARTRATE 15 MCG: 15 SOLUTION RESPIRATORY (INHALATION) at 09:16

## 2023-09-10 RX ADMIN — ARFORMOTEROL TARTRATE 15 MCG: 15 SOLUTION RESPIRATORY (INHALATION) at 20:49

## 2023-09-10 RX ADMIN — Medication 10 ML: at 21:39

## 2023-09-10 RX ADMIN — IPRATROPIUM BROMIDE 0.5 MG: 0.5 SOLUTION RESPIRATORY (INHALATION) at 13:04

## 2023-09-10 RX ADMIN — HYDROCORTISONE SODIUM SUCCINATE 50 MG: 100 INJECTION, POWDER, FOR SOLUTION INTRAMUSCULAR; INTRAVENOUS at 02:12

## 2023-09-10 RX ADMIN — Medication 5000 UNITS: at 08:19

## 2023-09-10 RX ADMIN — OXYCODONE HYDROCHLORIDE 5 MG: 5 TABLET ORAL at 10:06

## 2023-09-10 RX ADMIN — IPRATROPIUM BROMIDE 0.5 MG: 0.5 SOLUTION RESPIRATORY (INHALATION) at 17:11

## 2023-09-10 ASSESSMENT — PULMONARY FUNCTION TESTS
PIF_VALUE: 24
PIF_VALUE: 23
PIF_VALUE: 25
PIF_VALUE: 24
PIF_VALUE: 24
PIF_VALUE: 25
PIF_VALUE: 23
PIF_VALUE: 22
PIF_VALUE: 19
PIF_VALUE: 22
PIF_VALUE: 18
PIF_VALUE: 23
PIF_VALUE: 27
PIF_VALUE: 21
PIF_VALUE: 23
PIF_VALUE: 23
PIF_VALUE: 19
PIF_VALUE: 23
PIF_VALUE: 22
PIF_VALUE: 22
PIF_VALUE: 21
PIF_VALUE: 23
PIF_VALUE: 24
PIF_VALUE: 23
PIF_VALUE: 20
PIF_VALUE: 23
PIF_VALUE: 23
PIF_VALUE: 19
PIF_VALUE: 22
PIF_VALUE: 20
PIF_VALUE: 23
PIF_VALUE: 21
PIF_VALUE: 19
PIF_VALUE: 23
PIF_VALUE: 23

## 2023-09-10 ASSESSMENT — PAIN SCALES - GENERAL
PAINLEVEL_OUTOF10: 0
PAINLEVEL_OUTOF10: 4
PAINLEVEL_OUTOF10: 4

## 2023-09-10 NOTE — PLAN OF CARE
Problem: Pain  Goal: Verbalizes/displays adequate comfort level or baseline comfort level  9/10/2023 0850 by Shaggy Charles RN  Outcome: Progressing     Problem: Chronic Conditions and Co-morbidities  Goal: Patient's chronic conditions and co-morbidity symptoms are monitored and maintained or improved  9/10/2023 0850 by Shaggy Charles RN  Outcome: Progressing     Problem: Discharge Planning  Goal: Discharge to home or other facility with appropriate resources  9/10/2023 0850 by Shaggy Charles RN  Outcome: Progressing     Problem: Safety - Adult  Goal: Free from fall injury  9/10/2023 0850 by Shaggy Charles RN  Outcome: Progressing     Problem: Skin/Tissue Integrity  Goal: Absence of new skin breakdown  Description: 1. Monitor for areas of redness and/or skin breakdown  2. Assess vascular access sites hourly  3. Every 4-6 hours minimum:  Change oxygen saturation probe site  4. Every 4-6 hours:  If on nasal continuous positive airway pressure, respiratory therapy assess nares and determine need for appliance change or resting period. 9/10/2023 0850 by Shaggy Charles RN  Outcome: Progressing     Problem: ABCDS Injury Assessment  Goal: Absence of physical injury  9/10/2023 0850 by Shaggy Charles RN  Outcome: Progressing     Problem: Safety - Medical Restraint  Goal: Remains free of injury from restraints (Restraint for Interference with Medical Device)  Description: INTERVENTIONS:  1. Determine that other, less restrictive measures have been tried or would not be effective before applying the restraint  2. Evaluate the patient's condition at the time of restraint application  3. Inform patient/family regarding the reason for restraint  4.  Q2H: Monitor safety, psychosocial status, comfort, nutrition and hydration  9/10/2023 0850 by Shaggy Charles RN  Outcome: Progressing     Problem: Respiratory - Adult  Goal: Achieves optimal ventilation and oxygenation  9/10/2023 0850 by Shaggy Charles RN  Outcome: Progressing
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
## 2023-09-10 NOTE — PROGRESS NOTES
Spoke with Jagruti Mendosa, the pt is on continuous dialysis and is unable to come down today. Try again tomorrow.

## 2023-09-10 NOTE — PROGRESS NOTES
Pharmacy Consultation Note  (Antibiotic Dosing and Monitoring)    Initial consult date: 9/7/23  Consulting physician/provider: Dr. Mary Newton  Drug: Vancomycin  Indication: HAP; 7 days    Age/  Gender Height Weight IBW  Allergy Information   83 y.o./female 5' 1\" (154.9 cm) 100 lb (45.4 kg)     Ideal body weight: 47.8 kg (105 lb 6.1 oz)  Adjusted ideal body weight: 55.6 kg (122 lb 7.5 oz)   Iodides, Naproxen, Doxycycline, and Iodine      Renal Function:  Recent Labs     09/09/23  1649 09/09/23  2247 09/10/23  0531   BUN 18 16 14   CREATININE 1.1* 1.0 1.0         Intake/Output Summary (Last 24 hours) at 9/10/2023 0808  Last data filed at 9/10/2023 0800  Gross per 24 hour   Intake 4874 ml   Output 6651 ml   Net -1777 ml         Vancomycin Monitoring:  Trough:  No results for input(s): \"VANCOTROUGH\" in the last 72 hours. Random:    Recent Labs     09/08/23  0405 09/09/23  0407   VANCORANDOM 13.1 14.8         No results for input(s): \"BLOODCULT2\" in the last 72 hours. Historical Cultures:  Organism   Date Value Ref Range Status   07/25/2017 Gram-positive cocci (A)  Final     No results for input(s): \"BC\" in the last 72 hours. Vancomycin Administration Times:  Recent vancomycin administrations        No vancomycin IV orders with administrations found. Assessment:  Patient is a 80 y.o. female who has been initiated on vancomycin  Estimated Creatinine Clearance: 37 mL/min (based on SCr of 1 mg/dL).   CVV Initiated    Plan:  Vancomycin 750 mg IV q24h while on CVV  MRSA negative    Thank you for the consult,  Keely Longoria, AlisiaD, BCPS, BCCCP 9/10/2023 8:08 AM
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
## 2023-09-10 NOTE — PLAN OF CARE
Problem: Safety - Medical Restraint  Goal: Remains free of injury from restraints (Restraint for Interference with Medical Device)  Description: INTERVENTIONS:  1. Determine that other, less restrictive measures have been tried or would not be effective before applying the restraint  2. Evaluate the patient's condition at the time of restraint application  3. Inform patient/family regarding the reason for restraint  4.  Q2H: Monitor safety, psychosocial status, comfort, nutrition and hydration  9/10/2023 0650 by Aldo Birch RN  Outcome: Progressing  Flowsheets  Taken 9/10/2023 0600  Remains free of injury from restraints (restraint for interference with medical device): Determine that other, less restrictive measures have been tried or would not be effective before applying the restraint  Taken 9/10/2023 0400  Remains free of injury from restraints (restraint for interference with medical device): Determine that other, less restrictive measures have been tried or would not be effective before applying the restraint  Taken 9/10/2023 0000  Remains free of injury from restraints (restraint for interference with medical device): Determine that other, less restrictive measures have been tried or would not be effective before applying the restraint  Taken 9/9/2023 2200  Remains free of injury from restraints (restraint for interference with medical device): Determine that other, less restrictive measures have been tried or would not be effective before applying the restraint  Taken 9/9/2023 2000  Remains free of injury from restraints (restraint for interference with medical device): Determine that other, less restrictive measures have been tried or would not be effective before applying the restraint  9/9/2023 1852 by Ken Negron RN  Outcome: Progressing  Flowsheets (Taken 9/9/2023 0600 by Herlinda Erickson RN)  Remains free of injury from restraints (restraint for interference with medical device): Every 2 hours:

## 2023-09-11 ENCOUNTER — APPOINTMENT (OUTPATIENT)
Dept: ULTRASOUND IMAGING | Age: 83
End: 2023-09-11
Payer: MEDICARE

## 2023-09-11 ENCOUNTER — APPOINTMENT (OUTPATIENT)
Dept: NEUROLOGY | Age: 83
End: 2023-09-11
Payer: MEDICARE

## 2023-09-11 ENCOUNTER — HOSPITAL ENCOUNTER (OUTPATIENT)
Dept: OTHER | Age: 83
Setting detail: THERAPIES SERIES
Discharge: HOME | End: 2023-09-11

## 2023-09-11 ENCOUNTER — APPOINTMENT (OUTPATIENT)
Dept: GENERAL RADIOLOGY | Age: 83
End: 2023-09-11
Payer: MEDICARE

## 2023-09-11 PROBLEM — I50.20 HFREF (HEART FAILURE WITH REDUCED EJECTION FRACTION) (HCC): Status: ACTIVE | Noted: 2023-09-06
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
## 2023-09-11 LAB
AADO2: 96.5 MMHG
AADO2: 99.4 MMHG
ALBUMIN SERPL-MCNC: 4.3 G/DL (ref 3.5–5.2)
ALBUMIN SERPL-MCNC: 4.6 G/DL (ref 3.5–5.2)
ALBUMIN SERPL-MCNC: 4.7 G/DL (ref 3.5–5.2)
ALP SERPL-CCNC: 75 U/L (ref 35–104)
ALP SERPL-CCNC: 82 U/L (ref 35–104)
ALP SERPL-CCNC: 86 U/L (ref 35–104)
ALT SERPL-CCNC: 159 U/L (ref 0–32)
ALT SERPL-CCNC: 181 U/L (ref 0–32)
ALT SERPL-CCNC: 189 U/L (ref 0–32)
ANION GAP SERPL CALCULATED.3IONS-SCNC: 11 MMOL/L (ref 7–16)
ANION GAP SERPL CALCULATED.3IONS-SCNC: 12 MMOL/L (ref 7–16)
ANION GAP SERPL CALCULATED.3IONS-SCNC: 9 MMOL/L (ref 7–16)
AST SERPL-CCNC: 485 U/L (ref 0–31)
AST SERPL-CCNC: 567 U/L (ref 0–31)
AST SERPL-CCNC: 677 U/L (ref 0–31)
B.E.: -2.6 MMOL/L (ref -3–3)
B.E.: 1.5 MMOL/L (ref -3–3)
BASOPHILS # BLD: 0 K/UL (ref 0–0.2)
BASOPHILS # BLD: 0 K/UL (ref 0–0.2)
BASOPHILS # BLD: 0.01 K/UL (ref 0–0.2)
BASOPHILS NFR BLD: 0 % (ref 0–2)
BILIRUB SERPL-MCNC: 1.7 MG/DL (ref 0–1.2)
BILIRUB SERPL-MCNC: 1.9 MG/DL (ref 0–1.2)
BILIRUB SERPL-MCNC: 2.2 MG/DL (ref 0–1.2)
BUN SERPL-MCNC: 13 MG/DL (ref 6–23)
BUN SERPL-MCNC: 13 MG/DL (ref 6–23)
BUN SERPL-MCNC: 15 MG/DL (ref 6–23)
CA-I BLD-SCNC: 1.25 MMOL/L (ref 1.15–1.33)
CA-I BLD-SCNC: 1.26 MMOL/L (ref 1.15–1.33)
CA-I BLD-SCNC: 1.26 MMOL/L (ref 1.15–1.33)
CALCIUM SERPL-MCNC: 9.5 MG/DL (ref 8.6–10.2)
CALCIUM SERPL-MCNC: 9.7 MG/DL (ref 8.6–10.2)
CALCIUM SERPL-MCNC: 9.8 MG/DL (ref 8.6–10.2)
CHLORIDE SERPL-SCNC: 104 MMOL/L (ref 98–107)
CHLORIDE SERPL-SCNC: 105 MMOL/L (ref 98–107)
CHLORIDE SERPL-SCNC: 105 MMOL/L (ref 98–107)
CO2 SERPL-SCNC: 22 MMOL/L (ref 22–29)
CO2 SERPL-SCNC: 22 MMOL/L (ref 22–29)
CO2 SERPL-SCNC: 24 MMOL/L (ref 22–29)
COHB: 0.3 % (ref 0–1.5)
COHB: 0.7 % (ref 0–1.5)
CREAT SERPL-MCNC: 0.9 MG/DL (ref 0.5–1)
CREAT SERPL-MCNC: 0.9 MG/DL (ref 0.5–1)
CREAT SERPL-MCNC: 1 MG/DL (ref 0.5–1)
CRITICAL: ABNORMAL
CRITICAL: ABNORMAL
DATE ANALYZED: ABNORMAL
DATE ANALYZED: ABNORMAL
DATE OF COLLECTION: ABNORMAL
DATE OF COLLECTION: ABNORMAL
EOSINOPHIL # BLD: 0 K/UL (ref 0.05–0.5)
EOSINOPHILS RELATIVE PERCENT: 0 % (ref 0–6)
ERYTHROCYTE [DISTWIDTH] IN BLOOD BY AUTOMATED COUNT: 14.5 % (ref 11.5–15)
ERYTHROCYTE [DISTWIDTH] IN BLOOD BY AUTOMATED COUNT: 14.6 % (ref 11.5–15)
ERYTHROCYTE [DISTWIDTH] IN BLOOD BY AUTOMATED COUNT: 14.6 % (ref 11.5–15)
FIO2: 40 %
FIO2: 40 %
GFR SERPL CREATININE-BSD FRML MDRD: 57 ML/MIN/1.73M2
GFR SERPL CREATININE-BSD FRML MDRD: >60 ML/MIN/1.73M2
GFR SERPL CREATININE-BSD FRML MDRD: >60 ML/MIN/1.73M2
GLUCOSE BLD-MCNC: 106 MG/DL (ref 74–99)
GLUCOSE BLD-MCNC: 109 MG/DL (ref 74–99)
GLUCOSE BLD-MCNC: 99 MG/DL (ref 74–99)
GLUCOSE SERPL-MCNC: 105 MG/DL (ref 74–99)
GLUCOSE SERPL-MCNC: 107 MG/DL (ref 74–99)
GLUCOSE SERPL-MCNC: 96 MG/DL (ref 74–99)
HCO3: 22.6 MMOL/L (ref 22–26)
HCO3: 23.1 MMOL/L (ref 22–26)
HCT VFR BLD AUTO: 21.5 % (ref 34–48)
HCT VFR BLD AUTO: 22.6 % (ref 34–48)
HCT VFR BLD AUTO: 23.6 % (ref 34–48)
HGB BLD-MCNC: 6.8 G/DL (ref 11.5–15.5)
HGB BLD-MCNC: 7.3 G/DL (ref 11.5–15.5)
HGB BLD-MCNC: 7.5 G/DL (ref 11.5–15.5)
HHB: 1.3 % (ref 0–5)
HHB: 1.9 % (ref 0–5)
IMM GRANULOCYTES # BLD AUTO: 0.05 K/UL (ref 0–0.58)
IMM GRANULOCYTES # BLD AUTO: 0.07 K/UL (ref 0–0.58)
IMM GRANULOCYTES NFR BLD: 1 % (ref 0–5)
IMM GRANULOCYTES NFR BLD: 1 % (ref 0–5)
LAB: ABNORMAL
LAB: ABNORMAL
LACTATE BLDV-SCNC: 2.6 MMOL/L (ref 0.5–2.2)
LYMPHOCYTES NFR BLD: 0.53 K/UL (ref 1.5–4)
LYMPHOCYTES NFR BLD: 0.56 K/UL (ref 1.5–4)
LYMPHOCYTES NFR BLD: 0.59 K/UL (ref 1.5–4)
LYMPHOCYTES RELATIVE PERCENT: 6 % (ref 20–42)
LYMPHOCYTES RELATIVE PERCENT: 6 % (ref 20–42)
LYMPHOCYTES RELATIVE PERCENT: 7 % (ref 20–42)
Lab: ABNORMAL
Lab: ABNORMAL
MAGNESIUM SERPL-MCNC: 1.9 MG/DL (ref 1.6–2.6)
MAGNESIUM SERPL-MCNC: 1.9 MG/DL (ref 1.6–2.6)
MAGNESIUM SERPL-MCNC: 2 MG/DL (ref 1.6–2.6)
MCH RBC QN AUTO: 30.2 PG (ref 26–35)
MCH RBC QN AUTO: 30.2 PG (ref 26–35)
MCH RBC QN AUTO: 30.3 PG (ref 26–35)
MCHC RBC AUTO-ENTMCNC: 31.6 G/DL (ref 32–34.5)
MCHC RBC AUTO-ENTMCNC: 31.8 G/DL (ref 32–34.5)
MCHC RBC AUTO-ENTMCNC: 32.3 G/DL (ref 32–34.5)
MCV RBC AUTO: 93.8 FL (ref 80–99.9)
MCV RBC AUTO: 95.2 FL (ref 80–99.9)
MCV RBC AUTO: 95.6 FL (ref 80–99.9)
METHB: 0.4 % (ref 0–1.5)
METHB: 0.4 % (ref 0–1.5)
MODE: AC
MODE: AC
MONOCYTES NFR BLD: 0.38 K/UL (ref 0.1–0.95)
MONOCYTES NFR BLD: 0.47 K/UL (ref 0.1–0.95)
MONOCYTES NFR BLD: 0.63 K/UL (ref 0.1–0.95)
MONOCYTES NFR BLD: 4 % (ref 2–12)
MONOCYTES NFR BLD: 5 % (ref 2–12)
MONOCYTES NFR BLD: 6 % (ref 2–12)
NEUTROPHILS NFR BLD: 87 % (ref 43–80)
NEUTROPHILS NFR BLD: 87 % (ref 43–80)
NEUTROPHILS NFR BLD: 90 % (ref 43–80)
NEUTS SEG NFR BLD: 7.7 K/UL (ref 1.8–7.3)
NEUTS SEG NFR BLD: 7.99 K/UL (ref 1.8–7.3)
NEUTS SEG NFR BLD: 8.55 K/UL (ref 1.8–7.3)
O2 CONTENT: 11.6 ML/DL
O2 CONTENT: 11.6 ML/DL
O2 SATURATION: 98.1 % (ref 92–98.5)
O2 SATURATION: 98.7 % (ref 92–98.5)
O2HB: 97 % (ref 94–97)
O2HB: 98 % (ref 94–97)
OPERATOR ID: 2593
OPERATOR ID: 882
PARTIAL THROMBOPLASTIN TIME: 47.4 SEC (ref 24.5–35.1)
PATIENT TEMP: 37 C
PATIENT TEMP: 37 C
PCO2: 25.7 MMHG (ref 35–45)
PCO2: 40.9 MMHG (ref 35–45)
PEEP/CPAP: 5 CMH2O
PEEP/CPAP: 5 CMH2O
PFO2: 3.29 MMHG/%
PFO2: 3.65 MMHG/%
PH BLOOD GAS: 7.36 (ref 7.35–7.45)
PH BLOOD GAS: 7.57 (ref 7.35–7.45)
PHOSPHATE SERPL-MCNC: 2.2 MG/DL (ref 2.5–4.5)
PHOSPHATE SERPL-MCNC: 2.6 MG/DL (ref 2.5–4.5)
PHOSPHATE SERPL-MCNC: 3 MG/DL (ref 2.5–4.5)
PLATELET # BLD AUTO: 61 K/UL (ref 130–450)
PLATELET # BLD AUTO: 66 K/UL (ref 130–450)
PLATELET CONFIRMATION: NORMAL
PLATELET, FLUORESCENCE: 74 K/UL (ref 130–450)
PMV BLD AUTO: 12.5 FL (ref 7–12)
PMV BLD AUTO: 12.6 FL (ref 7–12)
PMV BLD AUTO: 13.2 FL (ref 7–12)
PO2: 131.7 MMHG (ref 75–100)
PO2: 146.2 MMHG (ref 75–100)
POTASSIUM SERPL-SCNC: 4.1 MMOL/L (ref 3.5–5)
POTASSIUM SERPL-SCNC: 4.2 MMOL/L (ref 3.5–5)
POTASSIUM SERPL-SCNC: 4.5 MMOL/L (ref 3.5–5)
PROCALCITONIN SERPL-MCNC: 0.19 NG/ML (ref 0–0.08)
PROT SERPL-MCNC: 5.5 G/DL (ref 6.4–8.3)
PROT SERPL-MCNC: 5.7 G/DL (ref 6.4–8.3)
PROT SERPL-MCNC: 5.7 G/DL (ref 6.4–8.3)
RBC # BLD AUTO: 2.25 M/UL (ref 3.5–5.5)
RBC # BLD AUTO: 2.41 M/UL (ref 3.5–5.5)
RBC # BLD AUTO: 2.48 M/UL (ref 3.5–5.5)
RBC # BLD: ABNORMAL 10*6/UL
RI(T): 0.68
RI(T): 0.73
RR MECHANICAL: 10 B/MIN
RR MECHANICAL: 16 B/MIN
SODIUM SERPL-SCNC: 137 MMOL/L (ref 132–146)
SODIUM SERPL-SCNC: 138 MMOL/L (ref 132–146)
SODIUM SERPL-SCNC: 139 MMOL/L (ref 132–146)
SOURCE, BLOOD GAS: ABNORMAL
SOURCE, BLOOD GAS: ABNORMAL
THB: 8.2 G/DL (ref 11.5–16.5)
THB: 8.3 G/DL (ref 11.5–16.5)
TIME ANALYZED: 1258
TIME ANALYZED: 508
VT MECHANICAL: 350 ML
VT MECHANICAL: 400 ML
WBC OTHER # BLD: 8.8 K/UL (ref 4.5–11.5)
WBC OTHER # BLD: 8.9 K/UL (ref 4.5–11.5)
WBC OTHER # BLD: 9.8 K/UL (ref 4.5–11.5)

## 2023-09-11 PROCEDURE — 86901 BLOOD TYPING SEROLOGIC RH(D): CPT

## 2023-09-11 PROCEDURE — 99232 SBSQ HOSP IP/OBS MODERATE 35: CPT

## 2023-09-11 PROCEDURE — 84100 ASSAY OF PHOSPHORUS: CPT

## 2023-09-11 PROCEDURE — 6360000002 HC RX W HCPCS: Performed by: INTERNAL MEDICINE

## 2023-09-11 PROCEDURE — 2580000003 HC RX 258: Performed by: INTERNAL MEDICINE

## 2023-09-11 PROCEDURE — 86850 RBC ANTIBODY SCREEN: CPT

## 2023-09-11 PROCEDURE — 6360000002 HC RX W HCPCS: Performed by: NURSE PRACTITIONER

## 2023-09-11 PROCEDURE — 2500000003 HC RX 250 WO HCPCS: Performed by: INTERNAL MEDICINE

## 2023-09-11 PROCEDURE — 6370000000 HC RX 637 (ALT 250 FOR IP): Performed by: INTERNAL MEDICINE

## 2023-09-11 PROCEDURE — 71045 X-RAY EXAM CHEST 1 VIEW: CPT

## 2023-09-11 PROCEDURE — 82962 GLUCOSE BLOOD TEST: CPT

## 2023-09-11 PROCEDURE — A4216 STERILE WATER/SALINE, 10 ML: HCPCS | Performed by: NURSE PRACTITIONER

## 2023-09-11 PROCEDURE — 2000000000 HC ICU R&B

## 2023-09-11 PROCEDURE — 83735 ASSAY OF MAGNESIUM: CPT

## 2023-09-11 PROCEDURE — 6370000000 HC RX 637 (ALT 250 FOR IP): Performed by: STUDENT IN AN ORGANIZED HEALTH CARE EDUCATION/TRAINING PROGRAM

## 2023-09-11 PROCEDURE — 6370000000 HC RX 637 (ALT 250 FOR IP): Performed by: NURSE PRACTITIONER

## 2023-09-11 PROCEDURE — 85025 COMPLETE CBC W/AUTO DIFF WBC: CPT

## 2023-09-11 PROCEDURE — 36430 TRANSFUSION BLD/BLD COMPNT: CPT

## 2023-09-11 PROCEDURE — 86923 COMPATIBILITY TEST ELECTRIC: CPT

## 2023-09-11 PROCEDURE — 2580000003 HC RX 258: Performed by: NURSE PRACTITIONER

## 2023-09-11 PROCEDURE — 80053 COMPREHEN METABOLIC PANEL: CPT

## 2023-09-11 PROCEDURE — P9047 ALBUMIN (HUMAN), 25%, 50ML: HCPCS | Performed by: INTERNAL MEDICINE

## 2023-09-11 PROCEDURE — 94640 AIRWAY INHALATION TREATMENT: CPT

## 2023-09-11 PROCEDURE — 6370000000 HC RX 637 (ALT 250 FOR IP): Performed by: FAMILY MEDICINE

## 2023-09-11 PROCEDURE — 36415 COLL VENOUS BLD VENIPUNCTURE: CPT

## 2023-09-11 PROCEDURE — 82330 ASSAY OF CALCIUM: CPT

## 2023-09-11 PROCEDURE — 6360000002 HC RX W HCPCS: Performed by: STUDENT IN AN ORGANIZED HEALTH CARE EDUCATION/TRAINING PROGRAM

## 2023-09-11 PROCEDURE — 86900 BLOOD TYPING SEROLOGIC ABO: CPT

## 2023-09-11 PROCEDURE — 84145 PROCALCITONIN (PCT): CPT

## 2023-09-11 PROCEDURE — 85730 THROMBOPLASTIN TIME PARTIAL: CPT

## 2023-09-11 PROCEDURE — 99232 SBSQ HOSP IP/OBS MODERATE 35: CPT | Performed by: PHYSICIAN ASSISTANT

## 2023-09-11 PROCEDURE — 95816 EEG AWAKE AND DROWSY: CPT

## 2023-09-11 PROCEDURE — 90945 DIALYSIS ONE EVALUATION: CPT

## 2023-09-11 PROCEDURE — 6360000002 HC RX W HCPCS: Performed by: PHYSICIAN ASSISTANT

## 2023-09-11 PROCEDURE — C9113 INJ PANTOPRAZOLE SODIUM, VIA: HCPCS | Performed by: NURSE PRACTITIONER

## 2023-09-11 PROCEDURE — 51798 US URINE CAPACITY MEASURE: CPT

## 2023-09-11 PROCEDURE — 99291 CRITICAL CARE FIRST HOUR: CPT | Performed by: INTERNAL MEDICINE

## 2023-09-11 PROCEDURE — 82805 BLOOD GASES W/O2 SATURATION: CPT

## 2023-09-11 PROCEDURE — 99232 SBSQ HOSP IP/OBS MODERATE 35: CPT | Performed by: INTERNAL MEDICINE

## 2023-09-11 PROCEDURE — 2580000003 HC RX 258: Performed by: PHYSICIAN ASSISTANT

## 2023-09-11 PROCEDURE — C9254 INJECTION, LACOSAMIDE: HCPCS | Performed by: PHYSICIAN ASSISTANT

## 2023-09-11 PROCEDURE — 83605 ASSAY OF LACTIC ACID: CPT

## 2023-09-11 PROCEDURE — P9016 RBC LEUKOCYTES REDUCED: HCPCS

## 2023-09-11 PROCEDURE — 94003 VENT MGMT INPAT SUBQ DAY: CPT

## 2023-09-11 PROCEDURE — 93971 EXTREMITY STUDY: CPT

## 2023-09-11 PROCEDURE — 2580000003 HC RX 258: Performed by: STUDENT IN AN ORGANIZED HEALTH CARE EDUCATION/TRAINING PROGRAM

## 2023-09-11 RX ORDER — SODIUM CHLORIDE 9 MG/ML
INJECTION, SOLUTION INTRAVENOUS PRN
Status: DISCONTINUED | OUTPATIENT
Start: 2023-09-11 | End: 2023-09-13

## 2023-09-11 RX ORDER — DOBUTAMINE HYDROCHLORIDE 400 MG/100ML
2.5-1 INJECTION INTRAVENOUS CONTINUOUS
Status: DISCONTINUED | OUTPATIENT
Start: 2023-09-11 | End: 2023-09-13 | Stop reason: SDUPTHER

## 2023-09-11 RX ORDER — MIDAZOLAM HYDROCHLORIDE 2 MG/2ML
2 INJECTION, SOLUTION INTRAMUSCULAR; INTRAVENOUS ONCE
Status: COMPLETED | OUTPATIENT
Start: 2023-09-11 | End: 2023-09-11

## 2023-09-11 RX ORDER — FENTANYL CITRATE 50 UG/ML
12.5 INJECTION, SOLUTION INTRAMUSCULAR; INTRAVENOUS
Status: DISCONTINUED | OUTPATIENT
Start: 2023-09-11 | End: 2023-09-14 | Stop reason: HOSPADM

## 2023-09-11 RX ORDER — FUROSEMIDE 10 MG/ML
20 INJECTION INTRAMUSCULAR; INTRAVENOUS 2 TIMES DAILY
Status: DISCONTINUED | OUTPATIENT
Start: 2023-09-11 | End: 2023-09-12

## 2023-09-11 RX ORDER — HEPARIN SODIUM 1000 [USP'U]/ML
INJECTION, SOLUTION INTRAVENOUS; SUBCUTANEOUS
Status: DISPENSED
Start: 2023-09-11 | End: 2023-09-12

## 2023-09-11 RX ORDER — FENTANYL CITRATE 50 UG/ML
25 INJECTION, SOLUTION INTRAMUSCULAR; INTRAVENOUS
Status: DISCONTINUED | OUTPATIENT
Start: 2023-09-11 | End: 2023-09-11

## 2023-09-11 RX ORDER — FENTANYL CITRATE 50 UG/ML
25 INJECTION, SOLUTION INTRAMUSCULAR; INTRAVENOUS
Status: DISCONTINUED | OUTPATIENT
Start: 2023-09-11 | End: 2023-09-11 | Stop reason: ALTCHOICE

## 2023-09-11 RX ORDER — MIDODRINE HYDROCHLORIDE 10 MG/1
10 TABLET ORAL
Status: DISCONTINUED | OUTPATIENT
Start: 2023-09-12 | End: 2023-09-14 | Stop reason: HOSPADM

## 2023-09-11 RX ADMIN — FENTANYL CITRATE 12.5 MCG: 50 INJECTION INTRAMUSCULAR; INTRAVENOUS at 18:45

## 2023-09-11 RX ADMIN — MIDODRINE HYDROCHLORIDE 5 MG: 5 TABLET ORAL at 11:48

## 2023-09-11 RX ADMIN — Medication 10 ML: at 08:29

## 2023-09-11 RX ADMIN — FUROSEMIDE 20 MG: 10 INJECTION, SOLUTION INTRAMUSCULAR; INTRAVENOUS at 20:57

## 2023-09-11 RX ADMIN — MEROPENEM 500 MG: 500 INJECTION, POWDER, FOR SOLUTION INTRAVENOUS at 14:48

## 2023-09-11 RX ADMIN — MIDAZOLAM 2 MG: 1 INJECTION INTRAMUSCULAR; INTRAVENOUS at 05:07

## 2023-09-11 RX ADMIN — IPRATROPIUM BROMIDE 0.5 MG: 0.5 SOLUTION RESPIRATORY (INHALATION) at 20:23

## 2023-09-11 RX ADMIN — Medication: at 03:06

## 2023-09-11 RX ADMIN — AMIODARONE HYDROCHLORIDE 200 MG: 200 TABLET ORAL at 13:31

## 2023-09-11 RX ADMIN — ARFORMOTEROL TARTRATE 15 MCG: 15 SOLUTION RESPIRATORY (INHALATION) at 20:23

## 2023-09-11 RX ADMIN — Medication 5000 UNITS: at 09:48

## 2023-09-11 RX ADMIN — ALBUTEROL SULFATE 2.5 MG: 2.5 SOLUTION RESPIRATORY (INHALATION) at 20:23

## 2023-09-11 RX ADMIN — ALBUMIN (HUMAN) 25 G: 0.25 INJECTION, SOLUTION INTRAVENOUS at 16:24

## 2023-09-11 RX ADMIN — Medication 10 ML: at 20:36

## 2023-09-11 RX ADMIN — FENTANYL CITRATE 12.5 MCG: 50 INJECTION INTRAMUSCULAR; INTRAVENOUS at 22:02

## 2023-09-11 RX ADMIN — ALBUMIN (HUMAN) 25 G: 0.25 INJECTION, SOLUTION INTRAVENOUS at 08:16

## 2023-09-11 RX ADMIN — IPRATROPIUM BROMIDE AND ALBUTEROL SULFATE 1 DOSE: .5; 2.5 SOLUTION RESPIRATORY (INHALATION) at 08:46

## 2023-09-11 RX ADMIN — CHLORHEXIDINE GLUCONATE 15 ML: 1.2 RINSE ORAL at 07:50

## 2023-09-11 RX ADMIN — Medication: at 08:11

## 2023-09-11 RX ADMIN — LACOSAMIDE 75 MG: 10 INJECTION INTRAVENOUS at 08:20

## 2023-09-11 RX ADMIN — Medication: at 08:13

## 2023-09-11 RX ADMIN — HYDROCORTISONE SODIUM SUCCINATE 50 MG: 100 INJECTION, POWDER, FOR SOLUTION INTRAMUSCULAR; INTRAVENOUS at 03:39

## 2023-09-11 RX ADMIN — HYDROCORTISONE SODIUM SUCCINATE 50 MG: 100 INJECTION, POWDER, FOR SOLUTION INTRAMUSCULAR; INTRAVENOUS at 13:31

## 2023-09-11 RX ADMIN — OXYCODONE HYDROCHLORIDE 10 MG: 10 TABLET ORAL at 11:40

## 2023-09-11 RX ADMIN — SODIUM PHOSPHATE, MONOBASIC, MONOHYDRATE AND SODIUM PHOSPHATE, DIBASIC, ANHYDROUS 6 MMOL: 276; 142 INJECTION, SOLUTION INTRAVENOUS at 08:26

## 2023-09-11 RX ADMIN — CHLORHEXIDINE GLUCONATE 15 ML: 1.2 RINSE ORAL at 20:36

## 2023-09-11 RX ADMIN — ALBUMIN (HUMAN) 25 G: 0.25 INJECTION, SOLUTION INTRAVENOUS at 02:45

## 2023-09-11 RX ADMIN — SODIUM CHLORIDE, PRESERVATIVE FREE 40 MG: 5 INJECTION INTRAVENOUS at 07:51

## 2023-09-11 RX ADMIN — ARFORMOTEROL TARTRATE 15 MCG: 15 SOLUTION RESPIRATORY (INHALATION) at 08:46

## 2023-09-11 RX ADMIN — MEROPENEM 500 MG: 500 INJECTION, POWDER, FOR SOLUTION INTRAVENOUS at 20:44

## 2023-09-11 RX ADMIN — FENTANYL CITRATE 12.5 MCG: 50 INJECTION INTRAMUSCULAR; INTRAVENOUS at 20:56

## 2023-09-11 RX ADMIN — LACOSAMIDE 75 MG: 10 INJECTION INTRAVENOUS at 20:38

## 2023-09-11 RX ADMIN — LEVOTHYROXINE SODIUM 25 MCG: 0.03 TABLET ORAL at 07:03

## 2023-09-11 RX ADMIN — OXYCODONE HYDROCHLORIDE 10 MG: 10 TABLET ORAL at 17:47

## 2023-09-11 RX ADMIN — MIDODRINE HYDROCHLORIDE 5 MG: 5 TABLET ORAL at 16:24

## 2023-09-11 RX ADMIN — MEROPENEM 500 MG: 500 INJECTION, POWDER, FOR SOLUTION INTRAVENOUS at 05:25

## 2023-09-11 RX ADMIN — AMIODARONE HYDROCHLORIDE 200 MG: 200 TABLET ORAL at 07:51

## 2023-09-11 RX ADMIN — CALCIUM CHLORIDE 8000 MG: 100 INJECTION INTRAVENOUS; INTRAVENTRICULAR at 09:44

## 2023-09-11 RX ADMIN — OXYCODONE HYDROCHLORIDE 5 MG: 5 TABLET ORAL at 03:39

## 2023-09-11 RX ADMIN — DOBUTAMINE IN DEXTROSE 2.5 MCG/KG/MIN: 400 INJECTION, SOLUTION INTRAVENOUS at 20:41

## 2023-09-11 RX ADMIN — FENTANYL CITRATE 25 MCG: 50 INJECTION INTRAMUSCULAR; INTRAVENOUS at 09:57

## 2023-09-11 RX ADMIN — Medication 300 MG: at 07:51

## 2023-09-11 RX ADMIN — AMIODARONE HYDROCHLORIDE 200 MG: 200 TABLET ORAL at 20:36

## 2023-09-11 RX ADMIN — SODIUM PHOSPHATE, MONOBASIC, MONOHYDRATE AND SODIUM PHOSPHATE, DIBASIC, ANHYDROUS 6 MMOL: 276; 142 INJECTION, SOLUTION INTRAVENOUS at 02:00

## 2023-09-11 RX ADMIN — Medication: at 11:47

## 2023-09-11 RX ADMIN — FENTANYL CITRATE 12.5 MCG: 50 INJECTION INTRAMUSCULAR; INTRAVENOUS at 23:58

## 2023-09-11 RX ADMIN — IPRATROPIUM BROMIDE AND ALBUTEROL SULFATE 1 DOSE: .5; 2.5 SOLUTION RESPIRATORY (INHALATION) at 16:24

## 2023-09-11 RX ADMIN — MIDODRINE HYDROCHLORIDE 5 MG: 5 TABLET ORAL at 07:51

## 2023-09-11 RX ADMIN — HYDROCORTISONE SODIUM SUCCINATE 50 MG: 100 INJECTION, POWDER, FOR SOLUTION INTRAMUSCULAR; INTRAVENOUS at 07:51

## 2023-09-11 ASSESSMENT — PULMONARY FUNCTION TESTS
PIF_VALUE: 18
PIF_VALUE: 19
PIF_VALUE: 19
PIF_VALUE: 16
PIF_VALUE: 19
PIF_VALUE: 20
PIF_VALUE: 20
PIF_VALUE: 16
PIF_VALUE: 17
PIF_VALUE: 19
PIF_VALUE: 20
PIF_VALUE: 15
PIF_VALUE: 18
PIF_VALUE: 20
PIF_VALUE: 19
PIF_VALUE: 24
PIF_VALUE: 19
PIF_VALUE: 17
PIF_VALUE: 21
PIF_VALUE: 16
PIF_VALUE: 19
PIF_VALUE: 18
PIF_VALUE: 16
PIF_VALUE: 19
PIF_VALUE: 19
PIF_VALUE: 31
PIF_VALUE: 19
PIF_VALUE: 20

## 2023-09-11 ASSESSMENT — PAIN SCALES - GENERAL
PAINLEVEL_OUTOF10: 6
PAINLEVEL_OUTOF10: 0
PAINLEVEL_OUTOF10: 0

## 2023-09-11 NOTE — PROGRESS NOTES
Physician Progress Note      PATIENT:               Shad Simental  CSN #:                  [id number]  :                       1940  ADMIT DATE:       2023 4:18 PM  [address] [place] [address] DATE:        2023 7:02 PM  RESPONDING  PROVIDER #:        Colonel Marcia COON          QUERY TEXT:    Pt admitted with shortness of breath and has CHF documented. If possible,   please document in progress notes and discharge summary further specificity   regarding the type and acuity of CHF:    The medical record reflects the following:  Risk Factors: valvular heart disease, COPD, pulmonary hypertension, Afib, age   80  Clinical Indicators: Per DCS \". Roberto Carrizales Acute on chronic heart failure, unspecified   heart failure type. Roberto Carrizales \", Per Echo report on  \". Roberto Carrizales Septal hypokinesis -   mild. Roberto Carrizales Estimated left ventricular ejection fraction is 45 ?5%. Roberto Carrizales Diastolic   function cannot be accurately assessed due to significant mitral    regurgitation. Roberto Carrizales Delayed closure of mitral valve suggesting increased end   diastolic pressure. Roberto Carrizales Pulmonary hypertension is mild. .. Moderate left pleural   effusion. Roberto Carrizales \", Per cardiology consult note on  \". Roberto Carrizales She admits to a small   amount of peripheral edema. Roberto Carrizales \"  Treatment: IV Bumex x1 dose, PO Bumex x1 dose, IV Lasix x5 doses, lisinopril,   metoprolol, spironolactone, Echo, cardiology and heart failure nurse consults,   daily weights, intake and output, telemetry monitoring, low sodium diet    Thank you,  Tiffanie Thomson, RN, BSN, CDIS  Clinical Documentation Improvement  [email or web address]  Options provided:  -- Acute on Chronic Systolic CHF/HFrEF  -- Acute on Chronic Diastolic CHF/HFpEF  -- Acute on Chronic Systolic and Diastolic CHF  -- Other - I will add my own diagnosis  -- Disagree - Not applicable / Not valid  -- Disagree - Clinically unable to determine / Unknown  -- Refer to Clinical Documentation Reviewer    PROVIDER RESPONSE TEXT:    This patient is in acute on chronic systolic CHF/HFrEF.     Query

## 2023-09-11 NOTE — PROGRESS NOTES
Physical Therapy  Pt on PT caseload for re-evaluation following change in status and transfer to ICU. Pt not appropriate this date d/t medical status, continuous dialysis, and EEG. Will check back as appropriate.     Lieutenant Delacruz, PT, DPT  [id number]

## 2023-09-11 NOTE — PROGRESS NOTES
CVVHDF stopped and set changed at 2002 due to clotting and clogging. Blood was not returned. CBC drawn at this time. CVVHD machine not working 2100.  New machine primed 2137    CVVHDF restarted at  2200
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
## 2023-09-11 NOTE — PROGRESS NOTES
Pharmacy Consultation Note  (Antibiotic Dosing and Monitoring)    Initial consult date: 9/7/23  Consulting physician/provider: Dr. Maria Ines Walton  Drug: Vancomycin  Indication: HAP; 7 days    Vancomycin has been discontinued. Clinical pharmacy will sign off, please reconsult if further assistance is needed.      Thank you for the consult,  Monika Ortiz, AlisiaD, BCPS, BCCCP 9/11/2023 12:15 PM
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
## 2023-09-11 NOTE — PLAN OF CARE
Problem: Pain  Goal: Verbalizes/displays adequate comfort level or baseline comfort level  Outcome: Progressing     Problem: Discharge Planning  Goal: Discharge to home or other facility with appropriate resources  Outcome: Progressing     Problem: Safety - Adult  Goal: Free from fall injury  Outcome: Progressing     Problem: Skin/Tissue Integrity  Goal: Absence of new skin breakdown  Description: 1. Monitor for areas of redness and/or skin breakdown  2. Assess vascular access sites hourly  3. Every 4-6 hours minimum:  Change oxygen saturation probe site  4. Every 4-6 hours:  If on nasal continuous positive airway pressure, respiratory therapy assess nares and determine need for appliance change or resting period. Outcome: Progressing     Problem: ABCDS Injury Assessment  Goal: Absence of physical injury  Outcome: Progressing     Problem: Safety - Medical Restraint  Goal: Remains free of injury from restraints (Restraint for Interference with Medical Device)  Description: INTERVENTIONS:  1. Determine that other, less restrictive measures have been tried or would not be effective before applying the restraint  2. Evaluate the patient's condition at the time of restraint application  3. Inform patient/family regarding the reason for restraint  4.  Q2H: Monitor safety, psychosocial status, comfort, nutrition and hydration  Outcome: Progressing  Flowsheets  Taken 9/11/2023 0600  Remains free of injury from restraints (restraint for interference with medical device): Every 2 hours: Monitor safety, psychosocial status, comfort, nutrition and hydration  Taken 9/11/2023 0400  Remains free of injury from restraints (restraint for interference with medical device): Every 2 hours: Monitor safety, psychosocial status, comfort, nutrition and hydration  Taken 9/11/2023 0200  Remains free of injury from restraints (restraint for interference with medical device): Every 2 hours: Monitor safety, psychosocial status, comfort,

## 2023-09-11 NOTE — PLAN OF CARE
Problem: Safety - Medical Restraint  Goal: Remains free of injury from restraints (Restraint for Interference with Medical Device)  Description: INTERVENTIONS:  1. Determine that other, less restrictive measures have been tried or would not be effective before applying the restraint  2. Evaluate the patient's condition at the time of restraint application  3. Inform patient/family regarding the reason for restraint  4.  Q2H: Monitor safety, psychosocial status, comfort, nutrition and hydration  9/11/2023 1023 by Julio Benton RN  Outcome: Progressing  Flowsheets  Taken 9/11/2023 1000  Remains free of injury from restraints (restraint for interference with medical device): Every 2 hours: Monitor safety, psychosocial status, comfort, nutrition and hydration  Taken 9/11/2023 0800  Remains free of injury from restraints (restraint for interference with medical device): Every 2 hours: Monitor safety, psychosocial status, comfort, nutrition and hydration     Problem: Chronic Conditions and Co-morbidities  Goal: Patient's chronic conditions and co-morbidity symptoms are monitored and maintained or improved  9/11/2023 0612 by Dexter Tate RN  Outcome: Not Progressing
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
## 2023-09-11 NOTE — PROCEDURES
EEG Report  Xander Gutiérrez is a 80 y.o. female      Appointment Date 9/11/2023   Appointment Time 8:30am   [place] [facility] [id number]   Type of Study Portable Floor 4409     Technical Specifications  Technician [id number] [address] of consciousness Awake on vent   Sleep deprived? no   Hyperventilation tested? no   Photic stim tested? no   EEG recording Standard 10-20 electrode placement    Duration of recording 25 mins   EEG complete? yes       Clinical History   ***    Medications    Current Facility-Administered Medications:     fentaNYL (SUBLIMAZE) injection 25 mcg, 25 mcg, IntraVENous, Q1H PRN, MARKOS Suarez CNP    amiodarone (CORDARONE) tablet 200 mg, 200 mg, Oral, TID, MARKOS Wong CNP, 200 mg at 09/11/23 0751    midodrine (PROAMATINE) tablet 5 mg, 5 mg, Oral, TID WC, Radha Alexander MD, 5 mg at 09/11/23 0751    metoprolol tartrate (LOPRESSOR) tablet 12.5 mg, 12.5 mg, Oral, BID, Raegan Peters,     meropenem (MERREM) 500 mg in sodium chloride 0.9 % 100 mL IVPB (mini-bag), 500 mg, IntraVENous, Q8H, Radha Alexander MD, Stopped at 09/11/23 0829    lacosamide (VIMPAT) 75 mg in sodium chloride 0.9 % 57.5 mL IVPB, 75 mg, IntraVENous, BID, HAILEY Garay, Stopped at 09/11/23 0850    pantoprazole (PROTONIX) 40 mg in sodium chloride (PF) 0.9 % 10 mL injection, 40 mg, IntraVENous, Daily, MARKOS Suarez CNP, 40 mg at 09/11/23 0751    perflutren lipid microspheres (DEFINITY) injection 1.5 mL, 1.5 mL, IntraVENous, ONCE PRN, MARKOS Suarez CNP    ferrous sulfate 300 MG/5ML solution 300 mg, 300 mg, Oral, Daily with breakfast, Avery Brasher MD, 300 mg at 09/11/23 0751    hydrocortisone sodium succinate PF (SOLU-CORTEF) injection 50 mg, 50 mg, IntraVENous, Q6H, Radha Alexander MD, 50 mg at 09/11/23 0751    levothyroxine (SYNTHROID) tablet 25 mcg, 25 mcg, Oral, Daily, Radha Alexander MD, 25 mcg at 09/11/23 0703    albumin human 25% IV solution 25 g, 25 g, Rate: 100 mL/hr at 09/08/23 1826, New Bag at 09/08/23 1826    fentaNYL (SUBLIMAZE) 1,000 mcg in sodium chloride 0.9% 100 mL infusion,  mcg/hr, IntraVENous, Continuous, Neel Taylor MD, Stopped at 09/08/23 0052    norepinephrine (LEVOPHED) 16 mg in sodium chloride 0.9 % 250 mL infusion, 1-100 mcg/min, IntraVENous, Continuous, Neel Taylor MD, Last Rate: 0.9 mL/hr at 09/11/23 0650, 1 mcg/min at 09/11/23 0650    diphenhydrAMINE (BENADRYL) injection 25 mg, 25 mg, IntraVENous, Q6H PRN, Neel Taylor MD, 25 mg at 09/08/23 1453    ipratropium 0.5 mg-albuterol 2.5 mg (DUONEB) nebulizer solution 1 Dose, 1 Dose, Inhalation, Q4H PRN, Neel Taylor MD, 1 Dose at 09/11/23 0846    lidocaine 2000 mg in dextrose 5% 500 mL infusion, 1 mg/min, IntraVENous, Continuous, Juan Lyn APRN - CNP, Last Rate: 15 mL/hr at 09/10/23 1620, 1 mg/min at 09/10/23 1620    chlorhexidine (PERIDEX) 0.12 % solution 15 mL, 15 mL, Mouth/Throat, BID, Satish Morenoon, APRN - CNP, 15 mL at 09/11/23 0750    acetaminophen (TYLENOL) 160 MG/5ML solution 650 mg, 650 mg, Oral, Q6H PRN, Satish Luis E, APRN - CNP, 650 mg at 09/08/23 1454    midazolam PF (VERSED) injection 2 mg, 2 mg, IntraVENous, Q4H PRN, Satish Luis E, APRN - CNP, 2 mg at 09/09/23 7413    glucose chewable tablet 16 g, 4 tablet, Oral, PRN, Satish Luis E, APRN - CNP    dextrose bolus 10% 125 mL, 125 mL, IntraVENous, PRN **OR** dextrose bolus 10% 250 mL, 250 mL, IntraVENous, PRN, Satish Luis E, APRN - CNP    glucagon injection 1 mg, 1 mg, SubCUTAneous, PRN, MARKOS Vogt - CNP    dextrose 10 % infusion, , IntraVENous, Continuous PRN, MARKOS Vogt - CNP    bisacodyl (DULCOLAX) EC tablet 5 mg, 5 mg, Oral, Daily PRN, Jalil Maddoxe, DO    [Held by provider] bumetanide (BUMEX) tablet 1 mg, 1 mg, Oral, Daily, Jalil Delcidatte, DO    [Held by provider] lisinopril (PRINIVIL;ZESTRIL) tablet 10 mg, 10 mg, Oral, Daily, Jalil Delcidatte, DO    [Held by provider] rosuvastatin (Estuardo Feldman)

## 2023-09-11 NOTE — PROGRESS NOTES
PROGRESS NOTE       PATIENT PROBLEM LIST:  Patient Active Problem List   Diagnosis Code    Acute GI hemorrhage K92.2    Acute renal failure ([facility]) N17.9    Anticoagulated Z79.01    Lactic acidosis E87.20    Dyspnea on exertion R06.09    Pulmonary fibrosis ([facility]) J84.10    Pulmonary emphysema ([facility]) J43.9    Dyspnea R06.00    Atrial fibrillation with rapid ventricular response ([facility]) I48.91    CHF (congestive heart failure), NYHA class I, acute on chronic, combined ([address]) I50.43    Near syncope R55    Thoracic compression fracture, closed, initial encounter ([address]) S22.000A    Compression fracture of body of thoracic vertebra ([address]) S22.000A    Compression fracture of thoracic spine, non-traumatic, initial encounter ([address]) M48.54XA    Acute on chronic heart failure, unspecified heart failure type ([address]) I50.9    Closed right hip fracture, initial encounter ([address]) S72.001A    Hx of blood clots Z86.718    Heart failure ([address]) I50.9    Atrial fibrillation ([facility]) I48.91    VT (ventricular tachycardia) ([address]) I47.20    Closed fracture of right hip ([address]) S72.001A    Calculus of gallbladder without cholecystitis without obstruction K80.20       SUBJECTIVE:  Duc Colon remains intubated but able to open her eyes and attempts to interact. REVIEW OF SYSTEMS:  Unable to accurately assess secondary to patient's critical status. Danilo Pagan MEDICATIONS:   amiodarone  200 mg Oral TID    midodrine  5 mg Oral TID WC    meropenem  500 mg IntraVENous Q8H    vancomycin  750 mg IntraVENous Q24H    lacosamide (VIMPAT) 75 mg in sodium chloride 0.9 % 57.5 mL IVPB  75 mg IntraVENous BID    pantoprazole (PROTONIX) 40 mg in sodium chloride (PF) 0.9 % 10 mL injection  40 mg IntraVENous Daily    ferrous sulfate  300 mg Oral Daily with breakfast    hydrocortisone sodium succinate PF  50 mg IntraVENous Q6H    levothyroxine  25 mcg Oral Daily    albumin human 25%  25 g IntraVENous Q8H    insulin lispro  0-8 Units SubCUTAneous TID WC    vancomycin

## 2023-09-11 NOTE — PLAN OF CARE
Problem: Safety - Adult  Goal: Free from fall injury  9/11/2023 1811 by Neris Benton RN  Outcome: Progressing     Problem: Skin/Tissue Integrity  Goal: Absence of new skin breakdown  Description: 1. Monitor for areas of redness and/or skin breakdown  2. Assess vascular access sites hourly  3. Every 4-6 hours minimum:  Change oxygen saturation probe site  4. Every 4-6 hours:  If on nasal continuous positive airway pressure, respiratory therapy assess nares and determine need for appliance change or resting period. 9/11/2023 1811 by Neris Benton RN  Outcome: Progressing     Problem: ABCDS Injury Assessment  Goal: Absence of physical injury  9/11/2023 1811 by Neris Benton RN  Outcome: Progressing     Problem: Safety - Medical Restraint  Goal: Remains free of injury from restraints (Restraint for Interference with Medical Device)  Description: INTERVENTIONS:  1. Determine that other, less restrictive measures have been tried or would not be effective before applying the restraint  2. Evaluate the patient's condition at the time of restraint application  3. Inform patient/family regarding the reason for restraint  4.  Q2H: Monitor safety, psychosocial status, comfort, nutrition and hydration  9/11/2023 1811 by Neris Benton RN  Outcome: Progressing  Flowsheets  Taken 9/11/2023 1800  Remains free of injury from restraints (restraint for interference with medical device): Every 2 hours: Monitor safety, psychosocial status, comfort, nutrition and hydration  Taken 9/11/2023 1600  Remains free of injury from restraints (restraint for interference with medical device): Every 2 hours: Monitor safety, psychosocial status, comfort, nutrition and hydration  Taken 9/11/2023 1400  Remains free of injury from restraints (restraint for interference with medical device): Every 2 hours: Monitor safety, psychosocial status, comfort, nutrition and hydration  Taken 9/11/2023 1200  Remains free of injury from restraints (restraint for interference with medical device): Every 2 hours: Monitor safety, psychosocial status, comfort, nutrition and hydration     Problem: Respiratory - Adult  Goal: Achieves optimal ventilation and oxygenation  9/11/2023 1811 by Julio Benton RN  Outcome: Progressing     Problem: Chronic Conditions and Co-morbidities  Goal: Patient's chronic conditions and co-morbidity symptoms are monitored and maintained or improved  9/11/2023 0612 by Dexter Tate RN  Outcome: Not Progressing

## 2023-09-12 ENCOUNTER — APPOINTMENT (OUTPATIENT)
Dept: GENERAL RADIOLOGY | Age: 83
End: 2023-09-12
Payer: MEDICARE

## 2023-09-12 LAB
AADO2: 110.5 MMHG
ABO/RH: NORMAL
ALBUMIN SERPL-MCNC: 4 G/DL (ref 3.5–5.2)
ALBUMIN SERPL-MCNC: 4.1 G/DL (ref 3.5–5.2)
ALBUMIN SERPL-MCNC: 4.3 G/DL (ref 3.5–5.2)
ALBUMIN SERPL-MCNC: 4.3 G/DL (ref 3.5–5.2)
ALBUMIN SERPL-MCNC: 4.5 G/DL (ref 3.5–5.2)
ALP SERPL-CCNC: 81 U/L (ref 35–104)
ALP SERPL-CCNC: 81 U/L (ref 35–104)
ALP SERPL-CCNC: 82 U/L (ref 35–104)
ALP SERPL-CCNC: 86 U/L (ref 35–104)
ALP SERPL-CCNC: 87 U/L (ref 35–104)
ALT SERPL-CCNC: 105 U/L (ref 0–32)
ALT SERPL-CCNC: 118 U/L (ref 0–32)
ALT SERPL-CCNC: 128 U/L (ref 0–32)
ALT SERPL-CCNC: 135 U/L (ref 0–32)
ALT SERPL-CCNC: 149 U/L (ref 0–32)
ANION GAP SERPL CALCULATED.3IONS-SCNC: 10 MMOL/L (ref 7–16)
ANION GAP SERPL CALCULATED.3IONS-SCNC: 10 MMOL/L (ref 7–16)
ANION GAP SERPL CALCULATED.3IONS-SCNC: 8 MMOL/L (ref 7–16)
ANION GAP SERPL CALCULATED.3IONS-SCNC: 9 MMOL/L (ref 7–16)
ANION GAP SERPL CALCULATED.3IONS-SCNC: 9 MMOL/L (ref 7–16)
ANTIBODY SCREEN: NEGATIVE
ARM BAND NUMBER: NORMAL
AST SERPL-CCNC: 279 U/L (ref 0–31)
AST SERPL-CCNC: 326 U/L (ref 0–31)
AST SERPL-CCNC: 354 U/L (ref 0–31)
AST SERPL-CCNC: 360 U/L (ref 0–31)
AST SERPL-CCNC: 432 U/L (ref 0–31)
B.E.: -2 MMOL/L (ref -3–3)
BASOPHILS # BLD: 0.01 K/UL (ref 0–0.2)
BASOPHILS NFR BLD: 0 % (ref 0–2)
BILIRUB SERPL-MCNC: 1.7 MG/DL (ref 0–1.2)
BILIRUB SERPL-MCNC: 1.8 MG/DL (ref 0–1.2)
BILIRUB SERPL-MCNC: 1.9 MG/DL (ref 0–1.2)
BILIRUB SERPL-MCNC: 2 MG/DL (ref 0–1.2)
BILIRUB SERPL-MCNC: 2 MG/DL (ref 0–1.2)
BLOOD BANK BLOOD PRODUCT EXPIRATION DATE: NORMAL
BLOOD BANK DISPENSE STATUS: NORMAL
BLOOD BANK ISBT PRODUCT BLOOD TYPE: 5100
BLOOD BANK PRODUCT CODE: NORMAL
BLOOD BANK SAMPLE EXPIRATION: NORMAL
BLOOD BANK UNIT TYPE AND RH: NORMAL
BNP SERPL-MCNC: ABNORMAL PG/ML (ref 0–450)
BPU ID: NORMAL
BUN SERPL-MCNC: 19 MG/DL (ref 6–23)
BUN SERPL-MCNC: 24 MG/DL (ref 6–23)
BUN SERPL-MCNC: 30 MG/DL (ref 6–23)
BUN SERPL-MCNC: 35 MG/DL (ref 6–23)
BUN SERPL-MCNC: 40 MG/DL (ref 6–23)
CALCIUM SERPL-MCNC: 9.2 MG/DL (ref 8.6–10.2)
CALCIUM SERPL-MCNC: 9.4 MG/DL (ref 8.6–10.2)
CALCIUM SERPL-MCNC: 9.4 MG/DL (ref 8.6–10.2)
CALCIUM SERPL-MCNC: 9.5 MG/DL (ref 8.6–10.2)
CALCIUM SERPL-MCNC: 9.6 MG/DL (ref 8.6–10.2)
CHLORIDE SERPL-SCNC: 102 MMOL/L (ref 98–107)
CHLORIDE SERPL-SCNC: 102 MMOL/L (ref 98–107)
CHLORIDE SERPL-SCNC: 104 MMOL/L (ref 98–107)
CHLORIDE SERPL-SCNC: 105 MMOL/L (ref 98–107)
CHLORIDE SERPL-SCNC: 105 MMOL/L (ref 98–107)
CO2 SERPL-SCNC: 22 MMOL/L (ref 22–29)
CO2 SERPL-SCNC: 23 MMOL/L (ref 22–29)
CO2 SERPL-SCNC: 23 MMOL/L (ref 22–29)
COHB: 0.6 % (ref 0–1.5)
COMPONENT: NORMAL
CREAT SERPL-MCNC: 1.2 MG/DL (ref 0.5–1)
CREAT SERPL-MCNC: 1.5 MG/DL (ref 0.5–1)
CREAT SERPL-MCNC: 1.8 MG/DL (ref 0.5–1)
CREAT SERPL-MCNC: 2 MG/DL (ref 0.5–1)
CREAT SERPL-MCNC: 2.2 MG/DL (ref 0.5–1)
CRITICAL: ABNORMAL
CROSSMATCH RESULT: NORMAL
DATE ANALYZED: ABNORMAL
DATE OF COLLECTION: ABNORMAL
EOSINOPHIL # BLD: 0 K/UL (ref 0.05–0.5)
EOSINOPHILS RELATIVE PERCENT: 0 % (ref 0–6)
ERYTHROCYTE [DISTWIDTH] IN BLOOD BY AUTOMATED COUNT: 15.3 % (ref 11.5–15)
FIO2: 40 %
GFR SERPL CREATININE-BSD FRML MDRD: 21 ML/MIN/1.73M2
GFR SERPL CREATININE-BSD FRML MDRD: 25 ML/MIN/1.73M2
GFR SERPL CREATININE-BSD FRML MDRD: 28 ML/MIN/1.73M2
GFR SERPL CREATININE-BSD FRML MDRD: 34 ML/MIN/1.73M2
GFR SERPL CREATININE-BSD FRML MDRD: 44 ML/MIN/1.73M2
GLUCOSE BLD-MCNC: 89 MG/DL (ref 74–99)
GLUCOSE BLD-MCNC: 91 MG/DL (ref 74–99)
GLUCOSE BLD-MCNC: 97 MG/DL (ref 74–99)
GLUCOSE SERPL-MCNC: 106 MG/DL (ref 74–99)
GLUCOSE SERPL-MCNC: 112 MG/DL (ref 74–99)
GLUCOSE SERPL-MCNC: 83 MG/DL (ref 74–99)
GLUCOSE SERPL-MCNC: 89 MG/DL (ref 74–99)
GLUCOSE SERPL-MCNC: 89 MG/DL (ref 74–99)
HCO3: 23.6 MMOL/L (ref 22–26)
HCT VFR BLD AUTO: 26.2 % (ref 34–48)
HGB BLD-MCNC: 8.1 G/DL (ref 11.5–15.5)
HHB: 2.3 % (ref 0–5)
IMM GRANULOCYTES # BLD AUTO: 0.1 K/UL (ref 0–0.58)
IMM GRANULOCYTES NFR BLD: 1 % (ref 0–5)
LAB: ABNORMAL
LYMPHOCYTES NFR BLD: 0.53 K/UL (ref 1.5–4)
LYMPHOCYTES RELATIVE PERCENT: 6 % (ref 20–42)
Lab: ABNORMAL
MAGNESIUM SERPL-MCNC: 2 MG/DL (ref 1.6–2.6)
MAGNESIUM SERPL-MCNC: 2 MG/DL (ref 1.6–2.6)
MAGNESIUM SERPL-MCNC: 2.1 MG/DL (ref 1.6–2.6)
MCH RBC QN AUTO: 29.7 PG (ref 26–35)
MCHC RBC AUTO-ENTMCNC: 30.9 G/DL (ref 32–34.5)
MCV RBC AUTO: 96 FL (ref 80–99.9)
METHB: 0.6 % (ref 0–1.5)
MICROORGANISM SPEC CULT: NORMAL
MICROORGANISM SPEC CULT: NORMAL
MODE: AC
MONOCYTES NFR BLD: 0.86 K/UL (ref 0.1–0.95)
MONOCYTES NFR BLD: 9 % (ref 2–12)
NEUTROPHILS NFR BLD: 85 % (ref 43–80)
NEUTS SEG NFR BLD: 8.2 K/UL (ref 1.8–7.3)
O2 CONTENT: 12.8 ML/DL
O2 SATURATION: 97.7 % (ref 92–98.5)
O2HB: 96.5 % (ref 94–97)
OPERATOR ID: 914
OSMOLALITY UR: 310 MOSM/KG (ref 300–900)
PATIENT TEMP: 37 C
PCO2: 43.9 MMHG (ref 35–45)
PEEP/CPAP: 5 CMH2O
PFO2: 2.86 MMHG/%
PH BLOOD GAS: 7.35 (ref 7.35–7.45)
PHOSPHATE SERPL-MCNC: 3.1 MG/DL (ref 2.5–4.5)
PHOSPHATE SERPL-MCNC: 3.2 MG/DL (ref 2.5–4.5)
PHOSPHATE SERPL-MCNC: 3.3 MG/DL (ref 2.5–4.5)
PHOSPHATE SERPL-MCNC: 3.3 MG/DL (ref 2.5–4.5)
PHOSPHATE SERPL-MCNC: 3.4 MG/DL (ref 2.5–4.5)
PLATELET # BLD AUTO: 61 K/UL (ref 130–450)
PLATELET CONFIRMATION: NORMAL
PMV BLD AUTO: 12.2 FL (ref 7–12)
PO2: 114.2 MMHG (ref 75–100)
POTASSIUM SERPL-SCNC: 4 MMOL/L (ref 3.5–5)
POTASSIUM SERPL-SCNC: 4.2 MMOL/L (ref 3.5–5)
POTASSIUM SERPL-SCNC: 4.3 MMOL/L (ref 3.5–5)
POTASSIUM SERPL-SCNC: 4.4 MMOL/L (ref 3.5–5)
POTASSIUM SERPL-SCNC: 4.4 MMOL/L (ref 3.5–5)
PROT SERPL-MCNC: 5 G/DL (ref 6.4–8.3)
PROT SERPL-MCNC: 5.3 G/DL (ref 6.4–8.3)
PROT SERPL-MCNC: 5.4 G/DL (ref 6.4–8.3)
RBC # BLD AUTO: 2.73 M/UL (ref 3.5–5.5)
RBC # BLD: ABNORMAL 10*6/UL
RI(T): 0.97
RR MECHANICAL: 10 B/MIN
SERVICE CMNT-IMP: NORMAL
SERVICE CMNT-IMP: NORMAL
SODIUM SERPL-SCNC: 134 MMOL/L (ref 132–146)
SODIUM SERPL-SCNC: 134 MMOL/L (ref 132–146)
SODIUM SERPL-SCNC: 135 MMOL/L (ref 132–146)
SODIUM SERPL-SCNC: 136 MMOL/L (ref 132–146)
SODIUM SERPL-SCNC: 137 MMOL/L (ref 132–146)
SODIUM UR-SCNC: 40 MMOL/L
SOURCE, BLOOD GAS: ABNORMAL
SPECIMEN DESCRIPTION: NORMAL
SPECIMEN DESCRIPTION: NORMAL
THB: 9.3 G/DL (ref 11.5–16.5)
TIME ANALYZED: 443
TOTAL PROTEIN, URINE: 253 MG/DL (ref 0–12)
TRANSFUSION STATUS: NORMAL
TROPONIN I SERPL HS-MCNC: 320 NG/L (ref 0–9)
UNIT DIVISION: 0
UNIT ISSUE DATE/TIME: NORMAL
VT MECHANICAL: 350 ML
WBC OTHER # BLD: 9.7 K/UL (ref 4.5–11.5)

## 2023-09-12 PROCEDURE — 83935 ASSAY OF URINE OSMOLALITY: CPT

## 2023-09-12 PROCEDURE — 83880 ASSAY OF NATRIURETIC PEPTIDE: CPT

## 2023-09-12 PROCEDURE — C9113 INJ PANTOPRAZOLE SODIUM, VIA: HCPCS | Performed by: NURSE PRACTITIONER

## 2023-09-12 PROCEDURE — 84100 ASSAY OF PHOSPHORUS: CPT

## 2023-09-12 PROCEDURE — 83735 ASSAY OF MAGNESIUM: CPT

## 2023-09-12 PROCEDURE — 6360000002 HC RX W HCPCS: Performed by: STUDENT IN AN ORGANIZED HEALTH CARE EDUCATION/TRAINING PROGRAM

## 2023-09-12 PROCEDURE — 82805 BLOOD GASES W/O2 SATURATION: CPT

## 2023-09-12 PROCEDURE — 80074 ACUTE HEPATITIS PANEL: CPT

## 2023-09-12 PROCEDURE — 6360000002 HC RX W HCPCS: Performed by: PHYSICIAN ASSISTANT

## 2023-09-12 PROCEDURE — 82962 GLUCOSE BLOOD TEST: CPT

## 2023-09-12 PROCEDURE — 6370000000 HC RX 637 (ALT 250 FOR IP): Performed by: FAMILY MEDICINE

## 2023-09-12 PROCEDURE — A4216 STERILE WATER/SALINE, 10 ML: HCPCS | Performed by: NURSE PRACTITIONER

## 2023-09-12 PROCEDURE — 6370000000 HC RX 637 (ALT 250 FOR IP): Performed by: STUDENT IN AN ORGANIZED HEALTH CARE EDUCATION/TRAINING PROGRAM

## 2023-09-12 PROCEDURE — 94640 AIRWAY INHALATION TREATMENT: CPT

## 2023-09-12 PROCEDURE — 6370000000 HC RX 637 (ALT 250 FOR IP): Performed by: NURSE PRACTITIONER

## 2023-09-12 PROCEDURE — 2580000003 HC RX 258: Performed by: NURSE PRACTITIONER

## 2023-09-12 PROCEDURE — 2580000003 HC RX 258: Performed by: INTERNAL MEDICINE

## 2023-09-12 PROCEDURE — 2580000003 HC RX 258: Performed by: STUDENT IN AN ORGANIZED HEALTH CARE EDUCATION/TRAINING PROGRAM

## 2023-09-12 PROCEDURE — C9254 INJECTION, LACOSAMIDE: HCPCS | Performed by: PHYSICIAN ASSISTANT

## 2023-09-12 PROCEDURE — 71045 X-RAY EXAM CHEST 1 VIEW: CPT

## 2023-09-12 PROCEDURE — 2000000000 HC ICU R&B

## 2023-09-12 PROCEDURE — 2580000003 HC RX 258: Performed by: PHYSICIAN ASSISTANT

## 2023-09-12 PROCEDURE — 94003 VENT MGMT INPAT SUBQ DAY: CPT

## 2023-09-12 PROCEDURE — 85025 COMPLETE CBC W/AUTO DIFF WBC: CPT

## 2023-09-12 PROCEDURE — 6360000002 HC RX W HCPCS: Performed by: NURSE PRACTITIONER

## 2023-09-12 PROCEDURE — 6370000000 HC RX 637 (ALT 250 FOR IP): Performed by: INTERNAL MEDICINE

## 2023-09-12 PROCEDURE — 84484 ASSAY OF TROPONIN QUANT: CPT

## 2023-09-12 PROCEDURE — 80053 COMPREHEN METABOLIC PANEL: CPT

## 2023-09-12 PROCEDURE — 84300 ASSAY OF URINE SODIUM: CPT

## 2023-09-12 PROCEDURE — 6360000002 HC RX W HCPCS: Performed by: INTERNAL MEDICINE

## 2023-09-12 PROCEDURE — 99291 CRITICAL CARE FIRST HOUR: CPT | Performed by: INTERNAL MEDICINE

## 2023-09-12 PROCEDURE — 84156 ASSAY OF PROTEIN URINE: CPT

## 2023-09-12 RX ORDER — FUROSEMIDE 10 MG/ML
20 INJECTION INTRAMUSCULAR; INTRAVENOUS ONCE
Status: DISCONTINUED | OUTPATIENT
Start: 2023-09-12 | End: 2023-09-12

## 2023-09-12 RX ORDER — SODIUM CHLORIDE 9 MG/ML
INJECTION, SOLUTION INTRAVENOUS CONTINUOUS
Status: DISCONTINUED | OUTPATIENT
Start: 2023-09-12 | End: 2023-09-13

## 2023-09-12 RX ORDER — MIDAZOLAM HYDROCHLORIDE 2 MG/2ML
2 INJECTION, SOLUTION INTRAMUSCULAR; INTRAVENOUS
Status: DISCONTINUED | OUTPATIENT
Start: 2023-09-12 | End: 2023-09-14 | Stop reason: HOSPADM

## 2023-09-12 RX ADMIN — FENTANYL CITRATE 12.5 MCG: 50 INJECTION INTRAMUSCULAR; INTRAVENOUS at 16:09

## 2023-09-12 RX ADMIN — SODIUM CHLORIDE 75 ML/HR: 9 INJECTION, SOLUTION INTRAVENOUS at 15:53

## 2023-09-12 RX ADMIN — CHLORHEXIDINE GLUCONATE 15 ML: 1.2 RINSE ORAL at 08:08

## 2023-09-12 RX ADMIN — FUROSEMIDE 20 MG: 10 INJECTION, SOLUTION INTRAMUSCULAR; INTRAVENOUS at 08:08

## 2023-09-12 RX ADMIN — MIDAZOLAM 2 MG: 1 INJECTION INTRAMUSCULAR; INTRAVENOUS at 22:50

## 2023-09-12 RX ADMIN — IPRATROPIUM BROMIDE 0.5 MG: 0.5 SOLUTION RESPIRATORY (INHALATION) at 20:37

## 2023-09-12 RX ADMIN — FENTANYL CITRATE 12.5 MCG: 50 INJECTION INTRAMUSCULAR; INTRAVENOUS at 05:44

## 2023-09-12 RX ADMIN — FENTANYL CITRATE 12.5 MCG: 50 INJECTION INTRAMUSCULAR; INTRAVENOUS at 15:00

## 2023-09-12 RX ADMIN — MEROPENEM 500 MG: 500 INJECTION, POWDER, FOR SOLUTION INTRAVENOUS at 05:06

## 2023-09-12 RX ADMIN — IPRATROPIUM BROMIDE 0.5 MG: 0.5 SOLUTION RESPIRATORY (INHALATION) at 12:50

## 2023-09-12 RX ADMIN — FENTANYL CITRATE 12.5 MCG: 50 INJECTION INTRAMUSCULAR; INTRAVENOUS at 19:24

## 2023-09-12 RX ADMIN — MIDODRINE HYDROCHLORIDE 10 MG: 10 TABLET ORAL at 15:48

## 2023-09-12 RX ADMIN — AMIODARONE HYDROCHLORIDE 200 MG: 200 TABLET ORAL at 19:52

## 2023-09-12 RX ADMIN — FENTANYL CITRATE 12.5 MCG: 50 INJECTION INTRAMUSCULAR; INTRAVENOUS at 04:07

## 2023-09-12 RX ADMIN — LACOSAMIDE 75 MG: 10 INJECTION INTRAVENOUS at 08:12

## 2023-09-12 RX ADMIN — Medication 10 ML: at 19:53

## 2023-09-12 RX ADMIN — IPRATROPIUM BROMIDE 0.5 MG: 0.5 SOLUTION RESPIRATORY (INHALATION) at 16:15

## 2023-09-12 RX ADMIN — Medication 10 ML: at 08:11

## 2023-09-12 RX ADMIN — SODIUM CHLORIDE, PRESERVATIVE FREE 40 MG: 5 INJECTION INTRAVENOUS at 08:08

## 2023-09-12 RX ADMIN — CHLORHEXIDINE GLUCONATE 15 ML: 1.2 RINSE ORAL at 19:54

## 2023-09-12 RX ADMIN — ARFORMOTEROL TARTRATE 15 MCG: 15 SOLUTION RESPIRATORY (INHALATION) at 09:00

## 2023-09-12 RX ADMIN — Medication 300 MG: at 08:08

## 2023-09-12 RX ADMIN — FENTANYL CITRATE 12.5 MCG: 50 INJECTION INTRAMUSCULAR; INTRAVENOUS at 21:58

## 2023-09-12 RX ADMIN — AMIODARONE HYDROCHLORIDE 200 MG: 200 TABLET ORAL at 15:48

## 2023-09-12 RX ADMIN — OXYCODONE HYDROCHLORIDE 10 MG: 10 TABLET ORAL at 05:31

## 2023-09-12 RX ADMIN — MIDODRINE HYDROCHLORIDE 10 MG: 10 TABLET ORAL at 08:08

## 2023-09-12 RX ADMIN — LEVOTHYROXINE SODIUM 25 MCG: 0.03 TABLET ORAL at 08:08

## 2023-09-12 RX ADMIN — AMIODARONE HYDROCHLORIDE 200 MG: 200 TABLET ORAL at 08:08

## 2023-09-12 RX ADMIN — ARFORMOTEROL TARTRATE 15 MCG: 15 SOLUTION RESPIRATORY (INHALATION) at 20:37

## 2023-09-12 RX ADMIN — MEROPENEM 500 MG: 500 INJECTION, POWDER, FOR SOLUTION INTRAVENOUS at 12:07

## 2023-09-12 RX ADMIN — IPRATROPIUM BROMIDE 0.5 MG: 0.5 SOLUTION RESPIRATORY (INHALATION) at 09:00

## 2023-09-12 RX ADMIN — LACOSAMIDE 75 MG: 10 INJECTION INTRAVENOUS at 20:41

## 2023-09-12 RX ADMIN — MIDODRINE HYDROCHLORIDE 10 MG: 10 TABLET ORAL at 12:03

## 2023-09-12 RX ADMIN — FENTANYL CITRATE 12.5 MCG: 50 INJECTION INTRAMUSCULAR; INTRAVENOUS at 13:16

## 2023-09-12 RX ADMIN — Medication 5000 UNITS: at 08:08

## 2023-09-12 RX ADMIN — OXYCODONE HYDROCHLORIDE 10 MG: 10 TABLET ORAL at 19:25

## 2023-09-12 ASSESSMENT — PULMONARY FUNCTION TESTS
PIF_VALUE: 21
PIF_VALUE: 18
PIF_VALUE: 18
PIF_VALUE: 24
PIF_VALUE: 17
PIF_VALUE: 16
PIF_VALUE: 18
PIF_VALUE: 20
PIF_VALUE: 19
PIF_VALUE: 17
PIF_VALUE: 20
PIF_VALUE: 19
PIF_VALUE: 20
PIF_VALUE: 19
PIF_VALUE: 20
PIF_VALUE: 18
PIF_VALUE: 20
PIF_VALUE: 18
PIF_VALUE: 22
PIF_VALUE: 19
PIF_VALUE: 20
PIF_VALUE: 17
PIF_VALUE: 19
PIF_VALUE: 20
PIF_VALUE: 16

## 2023-09-12 ASSESSMENT — PAIN SCALES - GENERAL
PAINLEVEL_OUTOF10: 8
PAINLEVEL_OUTOF10: 6

## 2023-09-12 NOTE — PROGRESS NOTES
Patient was on PSV, RR around 36 having a hard time breathing. Patient was given fentanyl by RN to help patient be more comfortable at this time. Patient going into apnea ventilation post medication, patient placed back to [facility] settings.

## 2023-09-12 NOTE — PROCEDURES
Patient still somewhat unstable for MRI at this time per RN, if we don't hear from RN today, will try tomorrow for MRI.

## 2023-09-12 NOTE — PROGRESS NOTES
Physical Therapy  PT consult to evaluate/treat received and appreciated. Pt chart reviewed and evaluation attempted. Pt on hold at this time d/t intubated and not following commands. Will check back as appropriate.     Khadra Thomas, PT, DPT  [id number]
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
## 2023-09-12 NOTE — PROGRESS NOTES
09/12/23 1257   Patient Observation   Pulse 64   Respirations 26   SpO2 99 %   Vent Information   Vent Mode (S)  CPAP/PS   Ventilator Settings   PEEP/CPAP (cmH2O) 5   FiO2  40 %   Pressure Support (cm H2O) (S)  10 cm H2O   Vent Patient Data (Readings)   Vt (Measured) 248 mL   Peak Inspiratory Pressure (cmH2O) 16 cmH2O   Rate Measured 27 br/min   Minute Volume (L/min) 7.54 Liters   Mean Airway Pressure (cmH2O) 7 cmH20   Plateau Pressure (cm H2O) 16 cm H2O   Driving Pressure 11   I:E Ratio 1:3.20   Flow Sensitivity 3 L/min   Backup Apnea On   Backup Rate 10 Breaths Per Minute   Backup Vt 350   Vent Alarm Settings   High Pressure (cmH2O) 40 cmH2O   Low Minute Volume (lpm) 3 L/min   High Minute Volume (lpm) 12 L/min   Low Exhaled Vt (ml) 250 mL   High Exhaled Vt (ml) 500 mL   RR High (bpm) 35 br/min   Apnea (secs) 20 secs   Additional Respiratoray Assessments   Humidification Source Heated wire   Humidification Temp 37   Ambu Bag With Mask At Bedside Yes   ETT    Placement Date/Time: 09/07/23 1213   Placed By: RT  Placement Verified By: Auscultation;Capnometry  Preoxygenation: Yes  Mask Ventilation: Mask ventilation not attempted (0)  Airway Type: Cuffed  Airway Tube Size: 8 mm  Laryngoscope: C-Mac  Location: . ..    Secured At 23 cm   Measured From Lips   Secured By Commercial tube sumner   Site Assessment Dry

## 2023-09-12 NOTE — FLOWSHEET NOTE
Dr. Caroline Davies notified in person of bladder scan volume of 125ml for shift. First scan was 75ml, 2 hours later was 125ml. Verbal order to place arnold catheter, monitor hourly outputs, and notify if decreased or not adequate. Read back and verified.

## 2023-09-12 NOTE — CARE COORDINATION
Care Coordination: LOS 7 days, RRT from 47 on 9/7/23. plan per prev SW was home at discharge. Per chart review, she was admitted a few days prior to this admission and sent home with home 02- not noted what DME was used. Pt s/p Right hip ORIF with cephalomedullary nailing. RRT on 9/7/23 for hypotension, Vtach. Currently off CVVHD, remains intubated on Dobutamine, Fentanyl prn, Merrem. Would benefit for therapy evals when medically appropriate.      Electronically signed by Mini Liz RN on 9/12/2023 at 10:12 AM

## 2023-09-12 NOTE — FLOWSHEET NOTE
Caraballo catheter placed without incident, minimal drainage noted, approximately 30ml, bladder scan shows 125ml. Fluids are started at 75ml/hr per order.

## 2023-09-12 NOTE — FLOWSHEET NOTE
Verbal order from Dr. Romero Homes in person to keep TLC in place at this time, as well as the arterial line. 3 peripheral sites obtained by nursing staff, but verbal order to keep all lines at this time and add a arnold catheter for hourly monitoring of output. Repeated and verified.

## 2023-09-12 NOTE — PROGRESS NOTES
09/12/23 0904   Patient Observation   Pulse 56   Respirations 15   SpO2 99 %   Vent Information   Vent Mode AC/VC   Ventilator Settings   Vt (Set, mL) 350 mL   Resp Rate (Set) 10 bmp   PEEP/CPAP (cmH2O) 5   FiO2  40 %   Pressure Support (cm H2O) 0 cm H2O   Peak Inspiratory Flow (Set) 55 L/sec   Vent Patient Data (Readings)   Vt (Measured) 351 mL   Peak Inspiratory Pressure (cmH2O) 20 cmH2O   Rate Measured 14 br/min   Minute Volume (L/min) 4.99 Liters   Peak Inspiratory Flow (lpm) 55 L/sec   Mean Airway Pressure (cmH2O) 7 cmH20   Plateau Pressure (cm H2O) 16 cm H2O   Driving Pressure 11   I:E Ratio 1:7.60   Flow Sensitivity 3 L/min   Static Compliance (L/cm H2O) 32   Backup Apnea On   Backup Rate 10 Breaths Per Minute   Backup Vt 350   Vent Alarm Settings   High Pressure (cmH2O) 40 cmH2O   Low Minute Volume (lpm) 3 L/min   High Minute Volume (lpm) 12 L/min   Low Exhaled Vt (ml) 250 mL   High Exhaled Vt (ml) 550 mL   RR High (bpm) 35 br/min   Apnea (secs) 20 secs   Additional Respiratoray Assessments   Humidification Source Heated wire   Humidification Temp 37   Circuit Condensation Drained   Ambu Bag With Mask At Bedside Yes   ETT    Placement Date/Time: 09/07/23 1213   Placed By: RT  Placement Verified By: Auscultation;Capnometry  Preoxygenation: Yes  Mask Ventilation: Mask ventilation not attempted (0)  Airway Type: Cuffed  Airway Tube Size: 8 mm  Laryngoscope: C-Mac  Location: . ..    Secured At 23 cm   Measured From Lips   Secured By Commercial tube sumner   Site Assessment Dry

## 2023-09-13 ENCOUNTER — APPOINTMENT (OUTPATIENT)
Dept: GENERAL RADIOLOGY | Age: 83
End: 2023-09-13
Attending: INTERNAL MEDICINE
Payer: MEDICARE

## 2023-09-13 ENCOUNTER — APPOINTMENT (OUTPATIENT)
Dept: MRI IMAGING | Age: 83
End: 2023-09-13
Payer: MEDICARE

## 2023-09-13 ENCOUNTER — APPOINTMENT (OUTPATIENT)
Dept: GENERAL RADIOLOGY | Age: 83
End: 2023-09-13
Payer: MEDICARE

## 2023-09-13 ENCOUNTER — ANESTHESIA (OUTPATIENT)
Dept: ICU | Age: 83
End: 2023-09-13
Payer: MEDICARE

## 2023-09-13 ENCOUNTER — ANESTHESIA EVENT (OUTPATIENT)
Dept: ICU | Age: 83
End: 2023-09-13
Payer: MEDICARE

## 2023-09-13 LAB
AADO2: 100 MMHG
ALBUMIN SERPL-MCNC: 3.5 G/DL (ref 3.5–5.2)
ALBUMIN SERPL-MCNC: 3.6 G/DL (ref 3.5–5.2)
ALP SERPL-CCNC: 77 U/L (ref 35–104)
ALP SERPL-CCNC: 84 U/L (ref 35–104)
ALT SERPL-CCNC: 83 U/L (ref 0–32)
ALT SERPL-CCNC: 86 U/L (ref 0–32)
ANION GAP SERPL CALCULATED.3IONS-SCNC: 11 MMOL/L (ref 7–16)
ANION GAP SERPL CALCULATED.3IONS-SCNC: 12 MMOL/L (ref 7–16)
AST SERPL-CCNC: 202 U/L (ref 0–31)
AST SERPL-CCNC: 230 U/L (ref 0–31)
B.E.: -4.2 MMOL/L (ref -3–3)
BASOPHILS # BLD: 0.02 K/UL (ref 0–0.2)
BASOPHILS NFR BLD: 0 % (ref 0–2)
BILIRUB SERPL-MCNC: 1.8 MG/DL (ref 0–1.2)
BILIRUB SERPL-MCNC: 2 MG/DL (ref 0–1.2)
BNP SERPL-MCNC: ABNORMAL PG/ML (ref 0–450)
BUN SERPL-MCNC: 43 MG/DL (ref 6–23)
BUN SERPL-MCNC: 43 MG/DL (ref 6–23)
CALCIUM SERPL-MCNC: 8.9 MG/DL (ref 8.6–10.2)
CALCIUM SERPL-MCNC: 9.1 MG/DL (ref 8.6–10.2)
CHLORIDE SERPL-SCNC: 105 MMOL/L (ref 98–107)
CHLORIDE SERPL-SCNC: 106 MMOL/L (ref 98–107)
CO2 SERPL-SCNC: 19 MMOL/L (ref 22–29)
CO2 SERPL-SCNC: 23 MMOL/L (ref 22–29)
COHB: 1.3 % (ref 0–1.5)
CREAT SERPL-MCNC: 2.4 MG/DL (ref 0.5–1)
CREAT SERPL-MCNC: 2.5 MG/DL (ref 0.5–1)
CRITICAL: ABNORMAL
DATE ANALYZED: ABNORMAL
DATE OF COLLECTION: ABNORMAL
EOSINOPHIL # BLD: 0.03 K/UL (ref 0.05–0.5)
EOSINOPHILS RELATIVE PERCENT: 0 % (ref 0–6)
ERYTHROCYTE [DISTWIDTH] IN BLOOD BY AUTOMATED COUNT: 16 % (ref 11.5–15)
FIO2: 35 %
GFR SERPL CREATININE-BSD FRML MDRD: 19 ML/MIN/1.73M2
GFR SERPL CREATININE-BSD FRML MDRD: 20 ML/MIN/1.73M2
GLUCOSE BLD-MCNC: 58 MG/DL (ref 74–99)
GLUCOSE BLD-MCNC: 77 MG/DL (ref 74–99)
GLUCOSE BLD-MCNC: 78 MG/DL (ref 74–99)
GLUCOSE BLD-MCNC: 83 MG/DL (ref 74–99)
GLUCOSE SERPL-MCNC: 71 MG/DL (ref 74–99)
GLUCOSE SERPL-MCNC: 72 MG/DL (ref 74–99)
HAV IGM SERPL QL IA: NONREACTIVE
HBV CORE IGM SERPL QL IA: NONREACTIVE
HBV SURFACE AG SERPL QL IA: NONREACTIVE
HCO3: 20.8 MMOL/L (ref 22–26)
HCT VFR BLD AUTO: 25.9 % (ref 34–48)
HCV AB SERPL QL IA: NONREACTIVE
HGB BLD-MCNC: 8.1 G/DL (ref 11.5–15.5)
HHB: 3.3 % (ref 0–5)
IMM GRANULOCYTES # BLD AUTO: 0.27 K/UL (ref 0–0.58)
IMM GRANULOCYTES NFR BLD: 2 % (ref 0–5)
LAB: ABNORMAL
LYMPHOCYTES NFR BLD: 0.77 K/UL (ref 1.5–4)
LYMPHOCYTES RELATIVE PERCENT: 7 % (ref 20–42)
Lab: ABNORMAL
MAGNESIUM SERPL-MCNC: 2 MG/DL (ref 1.6–2.6)
MCH RBC QN AUTO: 29.8 PG (ref 26–35)
MCHC RBC AUTO-ENTMCNC: 31.3 G/DL (ref 32–34.5)
MCV RBC AUTO: 95.2 FL (ref 80–99.9)
METHB: 0.5 % (ref 0–1.5)
MODE: AC
MONOCYTES NFR BLD: 1.16 K/UL (ref 0.1–0.95)
MONOCYTES NFR BLD: 10 % (ref 2–12)
NEUTROPHILS NFR BLD: 80 % (ref 43–80)
NEUTS SEG NFR BLD: 8.87 K/UL (ref 1.8–7.3)
O2 CONTENT: 12.6 ML/DL
O2 SATURATION: 96.6 % (ref 92–98.5)
O2HB: 94.9 % (ref 94–97)
OPERATOR ID: 2962
PATIENT TEMP: 37 C
PCO2: 37.7 MMHG (ref 35–45)
PEEP/CPAP: 5 CMH2O
PFO2: 2.77 MMHG/%
PH BLOOD GAS: 7.36 (ref 7.35–7.45)
PHOSPHATE SERPL-MCNC: 3.3 MG/DL (ref 2.5–4.5)
PHOSPHATE SERPL-MCNC: 3.8 MG/DL (ref 2.5–4.5)
PLATELET # BLD AUTO: 96 K/UL (ref 130–450)
PLATELET CONFIRMATION: NORMAL
PMV BLD AUTO: 13 FL (ref 7–12)
PO2: 97 MMHG (ref 75–100)
POTASSIUM SERPL-SCNC: 3.9 MMOL/L (ref 3.5–5)
POTASSIUM SERPL-SCNC: 4.4 MMOL/L (ref 3.5–5)
PROT SERPL-MCNC: 4.6 G/DL (ref 6.4–8.3)
PROT SERPL-MCNC: 4.8 G/DL (ref 6.4–8.3)
RBC # BLD AUTO: 2.72 M/UL (ref 3.5–5.5)
RI(T): 1.03
RR MECHANICAL: 10 B/MIN
SODIUM SERPL-SCNC: 137 MMOL/L (ref 132–146)
SODIUM SERPL-SCNC: 139 MMOL/L (ref 132–146)
SOURCE, BLOOD GAS: ABNORMAL
THB: 9.3 G/DL (ref 11.5–16.5)
TIME ANALYZED: 557
VT MECHANICAL: 350 ML
WBC OTHER # BLD: 11.1 K/UL (ref 4.5–11.5)

## 2023-09-13 PROCEDURE — 6370000000 HC RX 637 (ALT 250 FOR IP): Performed by: INTERNAL MEDICINE

## 2023-09-13 PROCEDURE — 6370000000 HC RX 637 (ALT 250 FOR IP): Performed by: NURSE PRACTITIONER

## 2023-09-13 PROCEDURE — 31500 INSERT EMERGENCY AIRWAY: CPT

## 2023-09-13 PROCEDURE — 2000000000 HC ICU R&B

## 2023-09-13 PROCEDURE — 2580000003 HC RX 258: Performed by: INTERNAL MEDICINE

## 2023-09-13 PROCEDURE — 6360000002 HC RX W HCPCS: Performed by: PHYSICIAN ASSISTANT

## 2023-09-13 PROCEDURE — 5A1D70Z PERFORMANCE OF URINARY FILTRATION, INTERMITTENT, LESS THAN 6 HOURS PER DAY: ICD-10-PCS | Performed by: INTERNAL MEDICINE

## 2023-09-13 PROCEDURE — A4216 STERILE WATER/SALINE, 10 ML: HCPCS | Performed by: NURSE PRACTITIONER

## 2023-09-13 PROCEDURE — 82962 GLUCOSE BLOOD TEST: CPT

## 2023-09-13 PROCEDURE — 95822 EEG COMA OR SLEEP ONLY: CPT | Performed by: PSYCHIATRY & NEUROLOGY

## 2023-09-13 PROCEDURE — 2580000003 HC RX 258: Performed by: NURSE PRACTITIONER

## 2023-09-13 PROCEDURE — 97166 OT EVAL MOD COMPLEX 45 MIN: CPT

## 2023-09-13 PROCEDURE — 6370000000 HC RX 637 (ALT 250 FOR IP): Performed by: STUDENT IN AN ORGANIZED HEALTH CARE EDUCATION/TRAINING PROGRAM

## 2023-09-13 PROCEDURE — 99291 CRITICAL CARE FIRST HOUR: CPT | Performed by: INTERNAL MEDICINE

## 2023-09-13 PROCEDURE — 6360000002 HC RX W HCPCS: Performed by: INTERNAL MEDICINE

## 2023-09-13 PROCEDURE — 6370000000 HC RX 637 (ALT 250 FOR IP): Performed by: FAMILY MEDICINE

## 2023-09-13 PROCEDURE — 83880 ASSAY OF NATRIURETIC PEPTIDE: CPT

## 2023-09-13 PROCEDURE — 5A1945Z RESPIRATORY VENTILATION, 24-96 CONSECUTIVE HOURS: ICD-10-PCS | Performed by: INTERNAL MEDICINE

## 2023-09-13 PROCEDURE — 74018 RADEX ABDOMEN 1 VIEW: CPT

## 2023-09-13 PROCEDURE — 99232 SBSQ HOSP IP/OBS MODERATE 35: CPT | Performed by: NURSE PRACTITIONER

## 2023-09-13 PROCEDURE — 0BH17EZ INSERTION OF ENDOTRACHEAL AIRWAY INTO TRACHEA, VIA NATURAL OR ARTIFICIAL OPENING: ICD-10-PCS | Performed by: INTERNAL MEDICINE

## 2023-09-13 PROCEDURE — 97535 SELF CARE MNGMENT TRAINING: CPT

## 2023-09-13 PROCEDURE — 80053 COMPREHEN METABOLIC PANEL: CPT

## 2023-09-13 PROCEDURE — 85025 COMPLETE CBC W/AUTO DIFF WBC: CPT

## 2023-09-13 PROCEDURE — 94003 VENT MGMT INPAT SUBQ DAY: CPT

## 2023-09-13 PROCEDURE — 83735 ASSAY OF MAGNESIUM: CPT

## 2023-09-13 PROCEDURE — 6360000002 HC RX W HCPCS: Performed by: NURSE PRACTITIONER

## 2023-09-13 PROCEDURE — 90935 HEMODIALYSIS ONE EVALUATION: CPT

## 2023-09-13 PROCEDURE — 6360000002 HC RX W HCPCS: Performed by: STUDENT IN AN ORGANIZED HEALTH CARE EDUCATION/TRAINING PROGRAM

## 2023-09-13 PROCEDURE — C9254 INJECTION, LACOSAMIDE: HCPCS | Performed by: PHYSICIAN ASSISTANT

## 2023-09-13 PROCEDURE — 6360000002 HC RX W HCPCS

## 2023-09-13 PROCEDURE — 94799 UNLISTED PULMONARY SVC/PX: CPT

## 2023-09-13 PROCEDURE — C9113 INJ PANTOPRAZOLE SODIUM, VIA: HCPCS | Performed by: NURSE PRACTITIONER

## 2023-09-13 PROCEDURE — 94640 AIRWAY INHALATION TREATMENT: CPT

## 2023-09-13 PROCEDURE — 71045 X-RAY EXAM CHEST 1 VIEW: CPT

## 2023-09-13 PROCEDURE — 2500000003 HC RX 250 WO HCPCS

## 2023-09-13 PROCEDURE — 2580000003 HC RX 258: Performed by: PHYSICIAN ASSISTANT

## 2023-09-13 PROCEDURE — 2580000003 HC RX 258: Performed by: STUDENT IN AN ORGANIZED HEALTH CARE EDUCATION/TRAINING PROGRAM

## 2023-09-13 PROCEDURE — 82805 BLOOD GASES W/O2 SATURATION: CPT

## 2023-09-13 PROCEDURE — 84100 ASSAY OF PHOSPHORUS: CPT

## 2023-09-13 PROCEDURE — 70551 MRI BRAIN STEM W/O DYE: CPT

## 2023-09-13 PROCEDURE — 2500000003 HC RX 250 WO HCPCS: Performed by: INTERNAL MEDICINE

## 2023-09-13 PROCEDURE — 99232 SBSQ HOSP IP/OBS MODERATE 35: CPT | Performed by: PHYSICIAN ASSISTANT

## 2023-09-13 PROCEDURE — 97530 THERAPEUTIC ACTIVITIES: CPT

## 2023-09-13 PROCEDURE — 97162 PT EVAL MOD COMPLEX 30 MIN: CPT

## 2023-09-13 RX ORDER — DOBUTAMINE HYDROCHLORIDE 200 MG/100ML
2.5-1 INJECTION INTRAVENOUS CONTINUOUS
Status: DISCONTINUED | OUTPATIENT
Start: 2023-09-13 | End: 2023-09-14 | Stop reason: HOSPADM

## 2023-09-13 RX ORDER — PANTOPRAZOLE SODIUM 40 MG/1
40 TABLET, DELAYED RELEASE ORAL
Status: DISCONTINUED | OUTPATIENT
Start: 2023-09-14 | End: 2023-09-14 | Stop reason: HOSPADM

## 2023-09-13 RX ORDER — SUCCINYLCHOLINE CHLORIDE 20 MG/ML
INJECTION INTRAMUSCULAR; INTRAVENOUS
Status: COMPLETED
Start: 2023-09-13 | End: 2023-09-13

## 2023-09-13 RX ORDER — ETOMIDATE 2 MG/ML
10 INJECTION INTRAVENOUS ONCE
Status: COMPLETED | OUTPATIENT
Start: 2023-09-13 | End: 2023-09-13

## 2023-09-13 RX ORDER — MIDAZOLAM HYDROCHLORIDE 1 MG/ML
1-10 INJECTION, SOLUTION INTRAVENOUS CONTINUOUS
Status: DISCONTINUED | OUTPATIENT
Start: 2023-09-13 | End: 2023-09-14 | Stop reason: HOSPADM

## 2023-09-13 RX ORDER — ETOMIDATE 2 MG/ML
INJECTION INTRAVENOUS
Status: COMPLETED
Start: 2023-09-13 | End: 2023-09-13

## 2023-09-13 RX ORDER — FENTANYL CITRATE-0.9 % NACL/PF 10 MCG/ML
25-200 PLASTIC BAG, INJECTION (ML) INTRAVENOUS CONTINUOUS
Status: DISCONTINUED | OUTPATIENT
Start: 2023-09-13 | End: 2023-09-14 | Stop reason: HOSPADM

## 2023-09-13 RX ORDER — SUCCINYLCHOLINE CHLORIDE 20 MG/ML
10 INJECTION INTRAMUSCULAR; INTRAVENOUS ONCE
Status: COMPLETED | OUTPATIENT
Start: 2023-09-13 | End: 2023-09-13

## 2023-09-13 RX ADMIN — MIDODRINE HYDROCHLORIDE 10 MG: 10 TABLET ORAL at 07:32

## 2023-09-13 RX ADMIN — FENTANYL CITRATE 12.5 MCG: 50 INJECTION INTRAMUSCULAR; INTRAVENOUS at 15:42

## 2023-09-13 RX ADMIN — SODIUM CHLORIDE, PRESERVATIVE FREE 40 MG: 5 INJECTION INTRAVENOUS at 07:33

## 2023-09-13 RX ADMIN — ARFORMOTEROL TARTRATE 15 MCG: 15 SOLUTION RESPIRATORY (INHALATION) at 09:51

## 2023-09-13 RX ADMIN — LACOSAMIDE 75 MG: 10 INJECTION INTRAVENOUS at 20:26

## 2023-09-13 RX ADMIN — MIDODRINE HYDROCHLORIDE 10 MG: 10 TABLET ORAL at 15:39

## 2023-09-13 RX ADMIN — Medication 10 ML: at 20:18

## 2023-09-13 RX ADMIN — CHLORHEXIDINE GLUCONATE 15 ML: 1.2 RINSE ORAL at 07:33

## 2023-09-13 RX ADMIN — SUCCINYLCHOLINE CHLORIDE 10 MG: 20 INJECTION, SOLUTION INTRAMUSCULAR; INTRAVENOUS at 21:57

## 2023-09-13 RX ADMIN — IPRATROPIUM BROMIDE 0.5 MG: 0.5 SOLUTION RESPIRATORY (INHALATION) at 13:11

## 2023-09-13 RX ADMIN — AMIODARONE HYDROCHLORIDE 200 MG: 200 TABLET ORAL at 15:39

## 2023-09-13 RX ADMIN — METOPROLOL TARTRATE 12.5 MG: 25 TABLET, FILM COATED ORAL at 07:32

## 2023-09-13 RX ADMIN — Medication 5000 UNITS: at 07:38

## 2023-09-13 RX ADMIN — IPRATROPIUM BROMIDE 0.5 MG: 0.5 SOLUTION RESPIRATORY (INHALATION) at 20:17

## 2023-09-13 RX ADMIN — SODIUM CHLORIDE: 9 INJECTION, SOLUTION INTRAVENOUS at 05:37

## 2023-09-13 RX ADMIN — IPRATROPIUM BROMIDE AND ALBUTEROL SULFATE 1 DOSE: .5; 2.5 SOLUTION RESPIRATORY (INHALATION) at 09:49

## 2023-09-13 RX ADMIN — MIDODRINE HYDROCHLORIDE 10 MG: 10 TABLET ORAL at 11:48

## 2023-09-13 RX ADMIN — ARFORMOTEROL TARTRATE 15 MCG: 15 SOLUTION RESPIRATORY (INHALATION) at 20:17

## 2023-09-13 RX ADMIN — ETOMIDATE 10 MG: 2 INJECTION INTRAVENOUS at 21:55

## 2023-09-13 RX ADMIN — Medication 300 MG: at 07:38

## 2023-09-13 RX ADMIN — AMIODARONE HYDROCHLORIDE 200 MG: 200 TABLET ORAL at 07:32

## 2023-09-13 RX ADMIN — LEVOTHYROXINE SODIUM 25 MCG: 0.03 TABLET ORAL at 06:09

## 2023-09-13 RX ADMIN — Medication 50 MCG/HR: at 22:03

## 2023-09-13 RX ADMIN — IPRATROPIUM BROMIDE 0.5 MG: 0.5 SOLUTION RESPIRATORY (INHALATION) at 09:49

## 2023-09-13 RX ADMIN — CHLORHEXIDINE GLUCONATE 15 ML: 1.2 RINSE ORAL at 20:18

## 2023-09-13 RX ADMIN — DOBUTAMINE HYDROCHLORIDE 2.5 MCG/KG/MIN: 200 INJECTION INTRAVENOUS at 22:55

## 2023-09-13 RX ADMIN — IPRATROPIUM BROMIDE 0.5 MG: 0.5 SOLUTION RESPIRATORY (INHALATION) at 17:31

## 2023-09-13 RX ADMIN — OXYCODONE HYDROCHLORIDE 10 MG: 10 TABLET ORAL at 10:37

## 2023-09-13 RX ADMIN — SUCCINYLCHOLINE CHLORIDE 10 MG: 20 INJECTION INTRAMUSCULAR; INTRAVENOUS at 21:57

## 2023-09-13 RX ADMIN — DEXTROSE MONOHYDRATE 125 ML: 100 INJECTION, SOLUTION INTRAVENOUS at 18:46

## 2023-09-13 RX ADMIN — LACOSAMIDE 75 MG: 10 INJECTION INTRAVENOUS at 08:36

## 2023-09-13 RX ADMIN — Medication 2 MG/HR: at 22:02

## 2023-09-13 RX ADMIN — FENTANYL CITRATE 12.5 MCG: 50 INJECTION INTRAMUSCULAR; INTRAVENOUS at 04:39

## 2023-09-13 RX ADMIN — Medication 10 ML: at 07:38

## 2023-09-13 RX ADMIN — MEROPENEM 500 MG: 500 INJECTION, POWDER, FOR SOLUTION INTRAVENOUS at 11:51

## 2023-09-13 ASSESSMENT — PULMONARY FUNCTION TESTS
PIF_VALUE: 18
PIF_VALUE: 16
PIF_VALUE: 16
PIF_VALUE: 14
PIF_VALUE: 18
PIF_VALUE: 19
PIF_VALUE: 15
PIF_VALUE: 17
PIF_VALUE: 20
PIF_VALUE: 17
PIF_VALUE: 15
PIF_VALUE: 16
PIF_VALUE: 14
PIF_VALUE: 16
PIF_VALUE: 15
PIF_VALUE: 17
PIF_VALUE: 19
PIF_VALUE: 17
PIF_VALUE: 17
PIF_VALUE: 19
PIF_VALUE: 16
PIF_VALUE: 17
PIF_VALUE: 18
PIF_VALUE: 16
PIF_VALUE: 17
PIF_VALUE: 18
PIF_VALUE: 10

## 2023-09-13 ASSESSMENT — PAIN SCALES - GENERAL
PAINLEVEL_OUTOF10: 0
PAINLEVEL_OUTOF10: 0
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
## 2023-09-13 NOTE — PROGRESS NOTES
DAILY VENTILATOR WEANING ASSESSMENT PERFORMED    P/FIO2 Ratio = 277         (<100= do not Wean)                  Cs =                          (<32= Instability)  Plat. Pressure =   MV = 5.04  RSBI =     Instabilities:       Cardiovascular =       CNS =       Respiratory =       Metabolic =    Parameters    {yes no:575893}    Wean per protocol  {yes TU:[id number]}    Ask Physician for a weaning plan {yes OY:[id number]}    Additional Comments:     Performed by Christopher Sims RCP RRT      Reference Table:    Cardiovascular     CNS      1. Mean BP less than or equal to 75   1. Neuromuscular blockade  2. Heart Rate greater than 130   2. RASS of -3, -4, -5  3. Myocardial Ischemia    3. RASS of +3, +4  4. Mechanical Assist Device    4. ICP greater than 15 or             Intracranial Hypertension         Respiratory      Metabolic  1. PEEP equal to or greater than 10cm/H20  1. Temp. (8hrs) less than 95 or > 103  2. Respiratory Rate greater than 35   2. WBC < 5000 or > 42475  3. Minute Volume greater than 15L  4. pH less than 7.30  5.  Deteriorating chest X-ray

## 2023-09-13 NOTE — PLAN OF CARE
Problem: Pain  Goal: Verbalizes/displays adequate comfort level or baseline comfort level  Outcome: Not Progressing     Problem: Chronic Conditions and Co-morbidities  Goal: Patient's chronic conditions and co-morbidity symptoms are monitored and maintained or improved  Outcome: Not Progressing     Problem: Discharge Planning  Goal: Discharge to home or other facility with appropriate resources  Outcome: Not Progressing     Problem: Safety - Adult  Goal: Free from fall injury  Outcome: Not Progressing     Problem: Respiratory - Adult  Goal: Achieves optimal ventilation and oxygenation  Outcome: Not Progressing     Problem: Nutrition Deficit:  Goal: Optimize nutritional status  Outcome: Not Progressing
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
## 2023-09-13 NOTE — PROGRESS NOTES
09/13/23 1210   Patient Observation   Pulse 73   Respirations 25   SpO2 94 %   Ventilator Settings   PEEP/CPAP (cmH2O) 5   FiO2  35 %   Pressure Support (cm H2O) 5 cm H2O   Vent Patient Data (Readings)   Vt (Measured) 179 mL   Peak Inspiratory Pressure (cmH2O) 10 cmH2O   Rate Measured 25 br/min   Minute Volume (L/min) 5.99 Liters   Mean Airway Pressure (cmH2O) 6 cmH20   Plateau Pressure (cm H2O) 14 cm H2O   Driving Pressure 9   I:E Ratio 1:3.70   Vent Alarm Settings   High Pressure (cmH2O) 40 cmH2O   Low Exhaled Vt (ml) 0 mL   Weaning Parameters   Spontaneous Breathing Trial Complete Yes   Respiratory Rate Observed 28   Ve 5.74      RSBI 168   NIF -23     No audible leak heard when cuff deflated. Tidal volume at 350 no air loss, tidal volume increased to 500, no air loss, no audible leak.

## 2023-09-13 NOTE — PROGRESS NOTES
ADL   Visual/  Perceptual Continue to assess                     Vitals:   HR at rest: 70 bpm HR at end of session: 75 bpm   SpO2 at rest: 98% SpO2 at end of session 96%   BP at rest: 145/84 mmHg BP at end of session 152/98 mmHg   /122 (?)    Treatment: OT treatment provided this date includes:     Instruction/training on safety and adapted techniques for completion of ADLs: to increase independence and safety in self-care. Instruction/training on safe bed mobility/functional mobility/transfer techniques: with focus on safety, body mechanics, and precautions   Proper Positioning/Alignment: for optimal healing, skin integrity, to prevent breakdown, decrease edema, reduce risk of contracture, and encourage functional positioning for interaction with environment. Skilled Monitoring of Vitals: to include BP, spO2, and HR throughout session to maximize safety. Sitting/Standing Balance/Tolerance: to increase balance and activity tolerance during ADLs and facilitate proper posture and positioning. Therapeutic activity: to challenge dynamic sitting/standing balance and endurance to promote safety during ADL tasks and functional transfers and mobility. Delirium Prevention: Environmental and sensory modifications assessed and implemented to decrease ICU acquired delirium and to improve overall orientation, mentation and pt interaction with family/staff. Line management and environmental modifications made prior to and end of session to ensure patient safety and to increase efficiency of session. Skilled monitoring of HR, O2 saturation, blood pressure and patient's response to activity performed throughout session. Comments: Pt case discussed in rounds, OK from RN to see patient. Evaluation completed with PT collaboration d/t decreased functional status of patient and extensive line management. Upon arrival, patient semi supine in bed. Pt pleasant and agreeable to participate in therapy session.   Pt alex standardized testing/informal observation of tasks, assessment of data and development of POC/Goals.      Moni Alonso, OCTAVIO,  OTR/L; [id number]

## 2023-09-13 NOTE — FLOWSHEET NOTE
09/13/23 1550   Vital Signs   /84   Pulse 66   Respirations 30   SpO2 100 %   Post-Hemodialysis Assessment   Post-Treatment Procedures Blood returned;Catheter capped, clamped and heparinized x 2 ports   Machine Disinfection Process Acid/Vinegar Clean;Heat Disinfect; Exterior Machine Disinfection   Rinseback Volume (ml) 300 ml   Blood Volume Processed (Liters) 66.4 L   Dialyzer Clearance Lightly streaked   Duration of Treatment (minutes) 210 minutes   Hemodialysis Intake (ml) 300 ml   Hemodialysis Output (ml) 1300 ml   NET Removed (ml) 1000   Tolerated Treatment Good   Patient Response to Treatment tolerated well   Bilateral Breath Sounds Diminished   Edema Generalized   RUE Edema +2   LUE Edema +2   Time Off 1545   Patient Disposition Remain in ICU/ED

## 2023-09-13 NOTE — FLOWSHEET NOTE
Restraints released and patient pulls at ETT. Unable to redirect patient. Bilateral soft wrist restraints continued to maintain patient safety.

## 2023-09-13 NOTE — PROGRESS NOTES
PROGRESS NOTE       PATIENT PROBLEM LIST:  Patient Active Problem List   Diagnosis Code    Acute GI hemorrhage K92.2    Acute renal failure ([facility]) N17.9    Anticoagulated Z79.01    Lactic acidosis E87.20    Dyspnea on exertion R06.09    Pulmonary fibrosis ([facility]) J84.10    Pulmonary emphysema ([facility]) J43.9    Dyspnea R06.00    Atrial fibrillation with rapid ventricular response ([facility]) I48.91    CHF (congestive heart failure), NYHA class I, acute on chronic, combined ([address]) I50.43    Near syncope R55    Thoracic compression fracture, closed, initial encounter ([address]) S22.000A    Compression fracture of body of thoracic vertebra ([address]) S22.000A    Compression fracture of thoracic spine, non-traumatic, initial encounter ([address]) M48.54XA    Acute on chronic heart failure, unspecified heart failure type ([address]) I50.9    Closed right hip fracture, initial encounter ([address]) S72.001A    Hx of blood clots Z86.718    HFrEF (heart failure with reduced ejection fraction) ([facility]) I50.20    Atrial fibrillation ([facility]) I48.91    VT (ventricular tachycardia) ([facility]) I47.20    Closed fracture of right hip ([address]) S72.001A    Calculus of gallbladder without cholecystitis without obstruction K80.20    Fall W19. [place] Necessary       SUBJECTIVE:  mAandeep Larios remains intubated and resting quietly. REVIEW OF SYSTEMS:  Unable to accurately assess secondary to patient's critical status. Chelsea Michelle MEDICATIONS:   midodrine  10 mg Oral TID WC    amiodarone  200 mg Oral TID    metoprolol tartrate  12.5 mg Oral BID    lacosamide (VIMPAT) 75 mg in sodium chloride 0.9 % 57.5 mL IVPB  75 mg IntraVENous BID    pantoprazole (PROTONIX) 40 mg in sodium chloride (PF) 0.9 % 10 mL injection  40 mg IntraVENous Daily    ferrous sulfate  300 mg Oral Daily with breakfast    levothyroxine  25 mcg Oral Daily    insulin lispro  0-8 Units SubCUTAneous TID WC    chlorhexidine  15 mL Mouth/Throat BID    [Held by provider] lisinopril  10 mg Oral Daily    [Held by provider] rosuvastatin lowering dose of potassium channel blocker but defer this to EP. Prognosis remains guarded. I have spent more than 30 critical care minutes face to face with Duc Colon and reviewing notes and laboratory data, with greater than 50% of this time instructing and counseling the patient  face to face regarding my findings and recommendations with her bedside nurse. Marshall Dawkins, DO FACP,FACC,FSCAI      NOTE:  This report was transcribed using voice recognition software.   Every effort was made to ensure accuracy; however, inadvertent computerized transcription errors may be present

## 2023-09-13 NOTE — PROGRESS NOTES
Patient was seen and evaluated on fourth morning. She remains intubated and sedated in the MICU. She is able to follow commands, able to hand grasp and wiggle toes.     General: Intubated and sedated  Musculoskeletal:  right lower extremity:  Postop dressings changed this morning  Compartments soft and compressible  Able to flex and extend digits on command  2+ PT Toes warm and perfused with brisk cap refill     Assessment:   Status post right hip cephalomedullary nailing on 9/6/2023  Septic shock  Lower lobe HA pneumonia  Hypercapnia/hypoxic respiratory failure  HFrEF  Possible partial anoxic brain injury      Plan:  Weightbearing as tolerated right lower extremity  Continue medical care in MICU  Appreciate medical/intensivist recommendations and treatment  Continue medical care for pneumonia-antibiotics  Variation neurology following  Cardiology following  Nephro-dialysis  Monitor vitals  Ortho will continue to monitor  Dressings were removed to right lower extremity, sutures to be removed in approximately 1 week on 9/20/2023
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
## 2023-09-14 ENCOUNTER — APPOINTMENT (OUTPATIENT)
Dept: GENERAL RADIOLOGY | Age: 83
End: 2023-09-14
Payer: MEDICARE

## 2023-09-14 LAB
AADO2: 356.7 MMHG
ALBUMIN SERPL-MCNC: 3.5 G/DL (ref 3.5–5.2)
ALBUMIN SERPL-MCNC: 3.5 G/DL (ref 3.5–5.2)
ALBUMIN SERPL-MCNC: 3.6 G/DL (ref 3.5–5.2)
ALP SERPL-CCNC: 82 U/L (ref 35–104)
ALP SERPL-CCNC: 88 U/L (ref 35–104)
ALP SERPL-CCNC: 89 U/L (ref 35–104)
ALT SERPL-CCNC: 49 U/L (ref 0–32)
ALT SERPL-CCNC: 62 U/L (ref 0–32)
ALT SERPL-CCNC: 65 U/L (ref 0–32)
ANION GAP SERPL CALCULATED.3IONS-SCNC: 11 MMOL/L (ref 7–16)
ANION GAP SERPL CALCULATED.3IONS-SCNC: 6 MMOL/L (ref 7–16)
ANION GAP SERPL CALCULATED.3IONS-SCNC: 8 MMOL/L (ref 7–16)
AST SERPL-CCNC: 114 U/L (ref 0–31)
AST SERPL-CCNC: 153 U/L (ref 0–31)
AST SERPL-CCNC: 164 U/L (ref 0–31)
B.E.: -0.8 MMOL/L (ref -3–3)
BASOPHILS # BLD: 0.01 K/UL (ref 0–0.2)
BASOPHILS NFR BLD: 0 % (ref 0–2)
BILIRUB SERPL-MCNC: 2.7 MG/DL (ref 0–1.2)
BILIRUB SERPL-MCNC: 3.1 MG/DL (ref 0–1.2)
BILIRUB SERPL-MCNC: 3.3 MG/DL (ref 0–1.2)
BNP SERPL-MCNC: ABNORMAL PG/ML (ref 0–450)
BUN SERPL-MCNC: 23 MG/DL (ref 6–23)
BUN SERPL-MCNC: 25 MG/DL (ref 6–23)
BUN SERPL-MCNC: 30 MG/DL (ref 6–23)
CALCIUM SERPL-MCNC: 8.2 MG/DL (ref 8.6–10.2)
CALCIUM SERPL-MCNC: 8.4 MG/DL (ref 8.6–10.2)
CALCIUM SERPL-MCNC: 8.6 MG/DL (ref 8.6–10.2)
CHLORIDE SERPL-SCNC: 102 MMOL/L (ref 98–107)
CHLORIDE SERPL-SCNC: 103 MMOL/L (ref 98–107)
CHLORIDE SERPL-SCNC: 98 MMOL/L (ref 98–107)
CO2 SERPL-SCNC: 23 MMOL/L (ref 22–29)
CO2 SERPL-SCNC: 26 MMOL/L (ref 22–29)
CO2 SERPL-SCNC: 26 MMOL/L (ref 22–29)
COHB: 1 % (ref 0–1.5)
CREAT SERPL-MCNC: 1.7 MG/DL (ref 0.5–1)
CREAT SERPL-MCNC: 1.8 MG/DL (ref 0.5–1)
CREAT SERPL-MCNC: 2.2 MG/DL (ref 0.5–1)
CRITICAL: ABNORMAL
DATE ANALYZED: ABNORMAL
DATE OF COLLECTION: ABNORMAL
EOSINOPHIL # BLD: 0.13 K/UL (ref 0.05–0.5)
EOSINOPHILS RELATIVE PERCENT: 2 % (ref 0–6)
ERYTHROCYTE [DISTWIDTH] IN BLOOD BY AUTOMATED COUNT: 15.9 % (ref 11.5–15)
FIO2: 90 %
GFR SERPL CREATININE-BSD FRML MDRD: 21 ML/MIN/1.73M2
GFR SERPL CREATININE-BSD FRML MDRD: 29 ML/MIN/1.73M2
GFR SERPL CREATININE-BSD FRML MDRD: 30 ML/MIN/1.73M2
GLUCOSE BLD-MCNC: 69 MG/DL (ref 74–99)
GLUCOSE BLD-MCNC: 74 MG/DL (ref 74–99)
GLUCOSE BLD-MCNC: 97 MG/DL (ref 74–99)
GLUCOSE SERPL-MCNC: 79 MG/DL (ref 74–99)
GLUCOSE SERPL-MCNC: 83 MG/DL (ref 74–99)
GLUCOSE SERPL-MCNC: 96 MG/DL (ref 74–99)
HCO3: 24.8 MMOL/L (ref 22–26)
HCT VFR BLD AUTO: 30.5 % (ref 34–48)
HGB BLD-MCNC: 9.7 G/DL (ref 11.5–15.5)
HHB: 0.7 % (ref 0–5)
IMM GRANULOCYTES # BLD AUTO: 0.17 K/UL (ref 0–0.58)
IMM GRANULOCYTES NFR BLD: 2 % (ref 0–5)
LAB: ABNORMAL
LYMPHOCYTES NFR BLD: 0.69 K/UL (ref 1.5–4)
LYMPHOCYTES RELATIVE PERCENT: 8 % (ref 20–42)
Lab: ABNORMAL
MAGNESIUM SERPL-MCNC: 1.8 MG/DL (ref 1.6–2.6)
MCH RBC QN AUTO: 30.1 PG (ref 26–35)
MCHC RBC AUTO-ENTMCNC: 31.8 G/DL (ref 32–34.5)
MCV RBC AUTO: 94.7 FL (ref 80–99.9)
METHB: 0.7 % (ref 0–1.5)
MODE: AC
MONOCYTES NFR BLD: 0.96 K/UL (ref 0.1–0.95)
MONOCYTES NFR BLD: 11 % (ref 2–12)
NEUTROPHILS NFR BLD: 78 % (ref 43–80)
NEUTS SEG NFR BLD: 6.94 K/UL (ref 1.8–7.3)
O2 CONTENT: 15.1 ML/DL
O2 SATURATION: 99.3 % (ref 92–98.5)
O2HB: 97.6 % (ref 94–97)
OPERATOR ID: 5523
PATIENT TEMP: 37 C
PCO2: 45.5 MMHG (ref 35–45)
PEEP/CPAP: 5 CMH2O
PFO2: 2.4 MMHG/%
PH BLOOD GAS: 7.36 (ref 7.35–7.45)
PHOSPHATE SERPL-MCNC: 2.6 MG/DL (ref 2.5–4.5)
PHOSPHATE SERPL-MCNC: 3 MG/DL (ref 2.5–4.5)
PHOSPHATE SERPL-MCNC: 3.5 MG/DL (ref 2.5–4.5)
PLATELET # BLD AUTO: 94 K/UL (ref 130–450)
PLATELET CONFIRMATION: NORMAL
PMV BLD AUTO: 11.7 FL (ref 7–12)
PO2: 215.9 MMHG (ref 75–100)
POTASSIUM SERPL-SCNC: 3.2 MMOL/L (ref 3.5–5)
POTASSIUM SERPL-SCNC: 3.3 MMOL/L (ref 3.5–5)
POTASSIUM SERPL-SCNC: 3.7 MMOL/L (ref 3.5–5)
PROCALCITONIN SERPL-MCNC: 0.28 NG/ML (ref 0–0.08)
PROT SERPL-MCNC: 4.5 G/DL (ref 6.4–8.3)
PROT SERPL-MCNC: 4.8 G/DL (ref 6.4–8.3)
PROT SERPL-MCNC: 5 G/DL (ref 6.4–8.3)
RBC # BLD AUTO: 3.22 M/UL (ref 3.5–5.5)
RI(T): 1.65
RR MECHANICAL: 10 B/MIN
SODIUM SERPL-SCNC: 132 MMOL/L (ref 132–146)
SODIUM SERPL-SCNC: 134 MMOL/L (ref 132–146)
SODIUM SERPL-SCNC: 137 MMOL/L (ref 132–146)
SOURCE, BLOOD GAS: ABNORMAL
THB: 10.6 G/DL (ref 11.5–16.5)
TIME ANALYZED: 434
VT MECHANICAL: 350 ML
WBC OTHER # BLD: 8.9 K/UL (ref 4.5–11.5)

## 2023-09-14 PROCEDURE — 36592 COLLECT BLOOD FROM PICC: CPT

## 2023-09-14 PROCEDURE — 6370000000 HC RX 637 (ALT 250 FOR IP): Performed by: FAMILY MEDICINE

## 2023-09-14 PROCEDURE — 80053 COMPREHEN METABOLIC PANEL: CPT

## 2023-09-14 PROCEDURE — 71045 X-RAY EXAM CHEST 1 VIEW: CPT

## 2023-09-14 PROCEDURE — 83735 ASSAY OF MAGNESIUM: CPT

## 2023-09-14 PROCEDURE — 99232 SBSQ HOSP IP/OBS MODERATE 35: CPT | Performed by: PHYSICIAN ASSISTANT

## 2023-09-14 PROCEDURE — 2500000003 HC RX 250 WO HCPCS: Performed by: INTERNAL MEDICINE

## 2023-09-14 PROCEDURE — 93005 ELECTROCARDIOGRAM TRACING: CPT | Performed by: INTERNAL MEDICINE

## 2023-09-14 PROCEDURE — 84100 ASSAY OF PHOSPHORUS: CPT

## 2023-09-14 PROCEDURE — 6370000000 HC RX 637 (ALT 250 FOR IP): Performed by: STUDENT IN AN ORGANIZED HEALTH CARE EDUCATION/TRAINING PROGRAM

## 2023-09-14 PROCEDURE — 6360000002 HC RX W HCPCS: Performed by: STUDENT IN AN ORGANIZED HEALTH CARE EDUCATION/TRAINING PROGRAM

## 2023-09-14 PROCEDURE — 84145 PROCALCITONIN (PCT): CPT

## 2023-09-14 PROCEDURE — 6370000000 HC RX 637 (ALT 250 FOR IP): Performed by: NURSE PRACTITIONER

## 2023-09-14 PROCEDURE — 6360000002 HC RX W HCPCS: Performed by: INTERNAL MEDICINE

## 2023-09-14 PROCEDURE — 6370000000 HC RX 637 (ALT 250 FOR IP): Performed by: INTERNAL MEDICINE

## 2023-09-14 PROCEDURE — 2580000003 HC RX 258: Performed by: INTERNAL MEDICINE

## 2023-09-14 PROCEDURE — 2580000003 HC RX 258: Performed by: PHYSICIAN ASSISTANT

## 2023-09-14 PROCEDURE — 94640 AIRWAY INHALATION TREATMENT: CPT

## 2023-09-14 PROCEDURE — 6360000002 HC RX W HCPCS: Performed by: PHYSICIAN ASSISTANT

## 2023-09-14 PROCEDURE — 99291 CRITICAL CARE FIRST HOUR: CPT | Performed by: INTERNAL MEDICINE

## 2023-09-14 PROCEDURE — 82805 BLOOD GASES W/O2 SATURATION: CPT

## 2023-09-14 PROCEDURE — 94003 VENT MGMT INPAT SUBQ DAY: CPT

## 2023-09-14 PROCEDURE — 85025 COMPLETE CBC W/AUTO DIFF WBC: CPT

## 2023-09-14 PROCEDURE — C9254 INJECTION, LACOSAMIDE: HCPCS | Performed by: PHYSICIAN ASSISTANT

## 2023-09-14 PROCEDURE — 82962 GLUCOSE BLOOD TEST: CPT

## 2023-09-14 PROCEDURE — 99232 SBSQ HOSP IP/OBS MODERATE 35: CPT | Performed by: NURSE PRACTITIONER

## 2023-09-14 PROCEDURE — 2580000003 HC RX 258: Performed by: STUDENT IN AN ORGANIZED HEALTH CARE EDUCATION/TRAINING PROGRAM

## 2023-09-14 PROCEDURE — 83880 ASSAY OF NATRIURETIC PEPTIDE: CPT

## 2023-09-14 RX ORDER — POTASSIUM CHLORIDE 7.45 MG/ML
10 INJECTION INTRAVENOUS
Status: ACTIVE | OUTPATIENT
Start: 2023-09-14 | End: 2023-09-14

## 2023-09-14 RX ORDER — SODIUM CHLORIDE 9 MG/ML
INJECTION, SOLUTION INTRAVENOUS
Status: DISCONTINUED
Start: 2023-09-14 | End: 2023-09-14 | Stop reason: HOSPADM

## 2023-09-14 RX ORDER — AMIODARONE HYDROCHLORIDE 50 MG/ML
INJECTION, SOLUTION INTRAVENOUS
Status: DISCONTINUED
Start: 2023-09-14 | End: 2023-09-14 | Stop reason: HOSPADM

## 2023-09-14 RX ORDER — NOREPINEPHRINE BITARTRATE 0.06 MG/ML
1-100 INJECTION, SOLUTION INTRAVENOUS CONTINUOUS
Status: DISCONTINUED | OUTPATIENT
Start: 2023-09-14 | End: 2023-09-14 | Stop reason: HOSPADM

## 2023-09-14 RX ADMIN — ARFORMOTEROL TARTRATE 15 MCG: 15 SOLUTION RESPIRATORY (INHALATION) at 08:48

## 2023-09-14 RX ADMIN — Medication 5 MCG/MIN: at 00:25

## 2023-09-14 RX ADMIN — POTASSIUM CHLORIDE 20 MEQ: 29.8 INJECTION, SOLUTION INTRAVENOUS at 13:59

## 2023-09-14 RX ADMIN — AMIODARONE HYDROCHLORIDE 150 MG: 50 INJECTION, SOLUTION INTRAVENOUS at 19:35

## 2023-09-14 RX ADMIN — Medication 50 MCG/HR: at 11:52

## 2023-09-14 RX ADMIN — AMIODARONE HYDROCHLORIDE 200 MG: 200 TABLET ORAL at 08:06

## 2023-09-14 RX ADMIN — AMIODARONE HYDROCHLORIDE 200 MG: 200 TABLET ORAL at 14:43

## 2023-09-14 RX ADMIN — IPRATROPIUM BROMIDE 0.5 MG: 0.5 SOLUTION RESPIRATORY (INHALATION) at 16:13

## 2023-09-14 RX ADMIN — DOBUTAMINE HYDROCHLORIDE 10 MCG/KG/MIN: 200 INJECTION INTRAVENOUS at 13:02

## 2023-09-14 RX ADMIN — MIDODRINE HYDROCHLORIDE 10 MG: 10 TABLET ORAL at 18:16

## 2023-09-14 RX ADMIN — IPRATROPIUM BROMIDE 0.5 MG: 0.5 SOLUTION RESPIRATORY (INHALATION) at 11:23

## 2023-09-14 RX ADMIN — LACOSAMIDE 75 MG: 10 INJECTION INTRAVENOUS at 09:13

## 2023-09-14 RX ADMIN — Medication 10 ML: at 08:06

## 2023-09-14 RX ADMIN — Medication 300 MG: at 08:05

## 2023-09-14 RX ADMIN — Medication 5000 UNITS: at 14:08

## 2023-09-14 RX ADMIN — MIDODRINE HYDROCHLORIDE 10 MG: 10 TABLET ORAL at 08:05

## 2023-09-14 RX ADMIN — IPRATROPIUM BROMIDE 0.5 MG: 0.5 SOLUTION RESPIRATORY (INHALATION) at 08:48

## 2023-09-14 RX ADMIN — LEVOTHYROXINE SODIUM 25 MCG: 0.03 TABLET ORAL at 05:51

## 2023-09-14 RX ADMIN — CHLORHEXIDINE GLUCONATE 15 ML: 1.2 RINSE ORAL at 08:06

## 2023-09-14 RX ADMIN — MIDODRINE HYDROCHLORIDE 10 MG: 10 TABLET ORAL at 13:02

## 2023-09-14 ASSESSMENT — PULMONARY FUNCTION TESTS
PIF_VALUE: 20
PIF_VALUE: 20
PIF_VALUE: 17
PIF_VALUE: 19
PIF_VALUE: 17
PIF_VALUE: 19
PIF_VALUE: 18
PIF_VALUE: 16
PIF_VALUE: 16
PIF_VALUE: 17
PIF_VALUE: 17
PIF_VALUE: 16
PIF_VALUE: 17
PIF_VALUE: 18
PIF_VALUE: 17
PIF_VALUE: 13
PIF_VALUE: 15
PIF_VALUE: 17
PIF_VALUE: 17
PIF_VALUE: 18
PIF_VALUE: 18
PIF_VALUE: 19
PIF_VALUE: 20
PIF_VALUE: 17
PIF_VALUE: 19
PIF_VALUE: 20
PIF_VALUE: 18
PIF_VALUE: 17
PIF_VALUE: 15
PIF_VALUE: 40
PIF_VALUE: 17
PIF_VALUE: 19
PIF_VALUE: 13
PIF_VALUE: 15
PIF_VALUE: 14

## 2023-09-14 ASSESSMENT — PAIN SCALES - GENERAL
PAINLEVEL_OUTOF10: 0

## 2023-09-14 NOTE — PROGRESS NOTES
09/14/23 0853   Patient Observation   Pulse 94   Respirations 26   SpO2 97 %   Vent Information   Vent Mode AC/VC   $Ventilation $Subsequent Day   Ventilator Settings   Vt (Set, mL) 350 mL   Resp Rate (Set) 10 bmp   PEEP/CPAP (cmH2O) 5   FiO2  85 %   Peak Inspiratory Flow (Set) 50 L/sec   Vent Patient Data (Readings)   Vt (Measured) 365 mL   Peak Inspiratory Pressure (cmH2O) 17 cmH2O   Rate Measured 10 br/min   Minute Volume (L/min) 3.66 Liters   Peak Inspiratory Flow (lpm) 50 L/sec   Mean Airway Pressure (cmH2O) 6 cmH20   Plateau Pressure (cm H2O) 13 cm H2O   Driving Pressure 8   I:E Ratio 1:6.90   Flow Sensitivity 3 L/min   Static Compliance (L/cm H2O) 42   Backup Apnea On   Backup Rate 10 Breaths Per Minute   Backup Vt 350   Vent Alarm Settings   High Pressure (cmH2O) 40 cmH2O   Low Minute Volume (lpm) 3.2 L/min   High Minute Volume (lpm) 20 L/min   Low Exhaled Vt (ml) 250 mL   High Exhaled Vt (ml) 530 mL   RR High (bpm) 35 br/min   Apnea (secs) 20 secs   Additional Respiratoray Assessments   Humidification Source Heated wire   Humidification Temp 37   Circuit Condensation Drained   Ambu Bag With Mask At Bedside Yes   ETT    Placement Date/Time: 09/13/23 (c) 2200   Placement Verified By: Auscultation;Capnometry  Preoxygenation: Yes  Mask Ventilation: Ventilated by mask (1)  Technique: Video laryngoscopy  Airway Tube Size: 8 mm  Laryngoscope: (c)   Blade Size: 4  Location:. ..    Secured At 23 cm   Measured From Lips   Secured By Commercial tube sumner   Site Assessment Dry
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
## 2023-09-14 NOTE — PROGRESS NOTES
PROGRESS NOTE       PATIENT PROBLEM LIST:  Patient Active Problem List   Diagnosis Code    Acute GI hemorrhage K92.2    Acute renal failure ([facility]) N17.9    Anticoagulated Z79.01    Lactic acidosis E87.20    Dyspnea on exertion R06.09    Pulmonary fibrosis ([facility]) J84.10    Pulmonary emphysema ([facility]) J43.9    Dyspnea R06.00    Atrial fibrillation with rapid ventricular response ([facility]) I48.91    CHF (congestive heart failure), NYHA class I, acute on chronic, combined ([address]) I50.43    Near syncope R55    Thoracic compression fracture, closed, initial encounter ([address]) S22.000A    Compression fracture of body of thoracic vertebra ([address]) S22.000A    Compression fracture of thoracic spine, non-traumatic, initial encounter ([address]) M48.54XA    Acute on chronic heart failure, unspecified heart failure type ([address]) I50.9    Closed right hip fracture, initial encounter ([address]) S72.001A    Hx of blood clots Z86.718    HFrEF (heart failure with reduced ejection fraction) ([facility]) I50.20    Atrial fibrillation ([facility]) I48.91    VT (ventricular tachycardia) ([facility]) I47.20    Closed fracture of right hip ([address]) S72.001A    Calculus of gallbladder without cholecystitis without obstruction K80.20    Fall W19. Darian        SUBJECTIVE:  Sonia Carlin remains on ventilator presently and does not interact much despite her eyes being clearly open. Blood pressure and heart rhythm remained stable presently. REVIEW OF SYSTEMS:  Unable to accurately assess secondary to patient's critical status. Nereida Black MEDICATIONS:   [START ON 9/14/2023] pantoprazole  40 mg Oral QAM AC    midodrine  10 mg Oral TID WC    amiodarone  200 mg Oral TID    [Held by provider] metoprolol tartrate  12.5 mg Oral BID    lacosamide (VIMPAT) 75 mg in sodium chloride 0.9 % 57.5 mL IVPB  75 mg IntraVENous BID    ferrous sulfate  300 mg Oral Daily with breakfast    levothyroxine  25 mcg Oral Daily    insulin lispro  0-8 Units SubCUTAneous TID WC    chlorhexidine  15 mL Mouth/Throat BID chronic liver disease. 6. Body wall edema. CTA HEAD W CONTRAST    Result Date: 9/8/2023  1. Stable CT head. No acute intracranial hemorrhage or edema. 2. No intracranial arterial occlusion. 3. No hemodynamically significant stenosis involving the internal carotid arteries or vertebral arteries. CTA NECK W CONTRAST    Result Date: 9/8/2023  1. Stable CT head. No acute intracranial hemorrhage or edema. 2. No intracranial arterial occlusion. 3. No hemodynamically significant stenosis involving the internal carotid arteries or vertebral arteries. XR CHEST PORTABLE    Result Date: 9/8/2023  1. Right IJ venous catheter at the cavoatrial junction/high right atrium area. Otherwise stable support apparatus. 2. Small bilateral pleural effusions with associated atelectasis/consolidation. 3.  Nonspecific interstitial prominence, chronic changes versus component of edema or atypical infection. 4. Low lung volumes. 5. Otherwise stable. US GALLBLADDER RUQ    Result Date: 9/8/2023  Significant gallbladder wall thickening/gallbladder wall edema at 17.2 L with cholelithiasis and sludge. Normal common bile duct. Findings highly worrisome for cholecystitis. CT ABDOMEN PELVIS WO CONTRAST Additional Contrast? None    Result Date: 9/8/2023  Diffuse edema including anasarca appearance and small to moderate right as well as small left pleural effusions. Mesenteric edema with small to moderate abdominopelvic ascites. Limited evaluation of solid organs due to lack of intravenous contrast without mechanical obstructive process of bowel or obstructing uropathy. Distended gallbladder with cholelithiasis present difficult to assess biliary tree however periportal edema adjacent of an overall edematous appearance     CT HEAD WO CONTRAST    Result Date: 9/8/2023  No acute intracranial abnormality. EKG: See Report  Echo: 9/8/2023  Summary   Left ventricle grossly normal in size.    Mild left ventricular concentric of gallbladder without cholecystitis without obstruction K80.20    Fall W19. Nicole Sharma       RECOMMENDATIONS:  Mrs. Ron Tidwell unfortunately has not put out significant quantities of urine since discontinuing CVVH. While her eyes are open she is not interactive this evening. Once again cardiac status has markedly decreased in systolic function since her last admission only a few weeks ago. Her overall prognosis must be considered guarded at best.with significant hepatic injury as demonstrated by her enzymes would consider perhaps lowering loading dose of potassium channel blocker and she is tolerating low-dose beta-blocker presently as well. I have spent more than 30 critical care minutes face to face with Chai Yadav and reviewing notes and laboratory data, with greater than 50% of this time attempting to interact with the patient  face to face regarding my findings and recommendations with her ICU nurse who has been helpful interacting with me to update on most recent clinical changes. Kaushik Hauser, DO FACP,FACC,[facility]      NOTE:  This report was transcribed using voice recognition software.   Every effort was made to ensure accuracy; however, inadvertent computerized transcription errors may be present

## 2023-09-14 NOTE — PLAN OF CARE
Problem: Pain  Goal: Verbalizes/displays adequate comfort level or baseline comfort level  Outcome: Progressing  Flowsheets  Taken 9/13/2023 1600 by Gume Rainey RN  Verbalizes/displays adequate comfort level or baseline comfort level:   Encourage patient to monitor pain and request assistance   Assess pain using appropriate pain scale   Administer analgesics based on type and severity of pain and evaluate response  Taken 9/13/2023 1400 by Gume Rainey RN  Verbalizes/displays adequate comfort level or baseline comfort level:   Encourage patient to monitor pain and request assistance   Assess pain using appropriate pain scale     Problem: Safety - Adult  Goal: Free from fall injury  Outcome: Progressing  Flowsheets (Taken 9/13/2023 2000)  Free From Fall Injury:   Instruct family/caregiver on patient safety   Based on caregiver fall risk screen, instruct family/caregiver to ask for assistance with transferring infant if caregiver noted to have fall risk factors     Problem: Skin/Tissue Integrity  Goal: Absence of new skin breakdown  Description: 1. Monitor for areas of redness and/or skin breakdown  2. Assess vascular access sites hourly  3. Every 4-6 hours minimum:  Change oxygen saturation probe site  4. Every 4-6 hours:  If on nasal continuous positive airway pressure, respiratory therapy assess nares and determine need for appliance change or resting period.   Outcome: Progressing     Problem: Chronic Conditions and Co-morbidities  Goal: Patient's chronic conditions and co-morbidity symptoms are monitored and maintained or improved  Outcome: Not Progressing  Flowsheets (Taken 9/13/2023 2000)  Care Plan - Patient's Chronic Conditions and Co-Morbidity Symptoms are Monitored and Maintained or Improved:   Monitor and assess patient's chronic conditions and comorbid symptoms for stability, deterioration, or improvement   Collaborate with multidisciplinary team to address chronic and comorbid conditions and restrictive measures have been tried or would not be effective before applying the restraint  Taken 9/13/2023 1800 by Gomez Lindsey, RN  Remains free of injury from restraints (restraint for interference with medical device):   Evaluate the patient's condition at the time of restraint application   Determine that other, less restrictive measures have been tried or would not be effective before applying the restraint  Taken 9/13/2023 1434 by Gomez Lindsey, RN  Remains free of injury from restraints (restraint for interference with medical device): Evaluate the patient's condition at the time of restraint application  Taken 7/59/3520 1400 by Gomez Lindsey, [address] [facility] [place] free of injury from restraints (restraint for interference with medical device): Evaluate the patient's condition at the time of restraint application  Taken 8/80/2060 1200 by Gomez Lindsey, RN  Remains free of injury from restraints (restraint for interference with medical device): Evaluate the patient's condition at the time of restraint application     Problem: Respiratory - Adult  Goal: Achieves optimal ventilation and oxygenation  Outcome: Not Progressing     Problem: Nutrition Deficit:  Goal: Optimize nutritional status  Outcome: Not Progressing

## 2023-09-14 NOTE — PROGRESS NOTES
[facility] notified of patient's time of death. [facility] not pursuing organ donation at this time.

## 2023-09-14 NOTE — CARE COORDINATION
Care Coordination: LOS 9  Pt Self extubated last evening, reintubated. Palliative is following, DNR CCA. Dobutamine, Fentanyl, Versed, Levophed,  S/P ORIF R Hip, Renal following for acute renal failure, Neurology and cardiology following as wel. Back door to select to follow.     Electronically signed by Maxime Sarabia RN on 9/14/2023 at 3:44 PM
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
## 2023-09-14 NOTE — PROGRESS NOTES
09/14/23 1126   Patient Observation   Pulse 90   Respirations 14   SpO2 100 %   Vent Information   Vent Mode AC/VC   Ventilator Settings   Vt (Set, mL) 350 mL   Resp Rate (Set) 10 bmp   PEEP/CPAP (cmH2O) 8   FiO2  70 %   Peak Inspiratory Flow (Set) 50 L/sec   Vent Patient Data (Readings)   Vt (Measured) 364 mL   Peak Inspiratory Pressure (cmH2O) 20 cmH2O   Rate Measured 10 br/min   Minute Volume (L/min) 3.68 Liters   Peak Inspiratory Flow (lpm) 50 L/sec   Mean Airway Pressure (cmH2O) 9 cmH20   Plateau Pressure (cm H2O) 13 cm H2O   Driving Pressure 5   I:E Ratio 1:6.90   Flow Sensitivity 3 L/min   Backup Apnea On   Backup Rate 10 Breaths Per Minute   Backup Vt 350   Vent Alarm Settings   High Pressure (cmH2O) 40 cmH2O   Low Minute Volume (lpm) 3.2 L/min   High Minute Volume (lpm) 20 L/min   Low Exhaled Vt (ml) 250 mL   High Exhaled Vt (ml) 530 mL   RR High (bpm) 35 br/min   Apnea (secs) 20 secs   Additional Respiratoray Assessments   Humidification Source Heated wire   Humidification Temp 37   Ambu Bag With Mask At Bedside Yes   ETT    Placement Date/Time: 09/13/23 (c) 2200   Placement Verified By: Auscultation;Capnometry  Preoxygenation: Yes  Mask Ventilation: Ventilated by mask (1)  Technique: Video laryngoscopy  Airway Tube Size: 8 mm  Laryngoscope: (c)   Blade Size: 4  Location:. ..    Secured At 23 cm   Measured From Lips   Secured By Commercial tube sumner   Site Assessment Dry

## 2023-09-14 NOTE — DEATH NOTES
Expiration Note:    Called to bedside to pronounce death after patient was noted to be in asystole at [address] 9/14/2023. NO pupillary, corneal, doll's eye or gag reflex noted. NO heart sounds or pulse noted. NO Chest rise or Lung sounds noted. NO Response to painful stimuli  Time of death declared at 12.       Elena Stallworth MD MD PGY-1  9/14/2023  7:55 PM

## 2023-09-14 NOTE — PROGRESS NOTES
500 mg in dextrose 5 % 250 mL infusion  2.5-10 mcg/kg/min IntraVENous Continuous Merry Bettencourt MD 19.1 mL/hr at 09/14/23 0645 10 mcg/kg/min at 09/14/23 0645    midazolam PF (VERSED) injection 2 mg  2 mg IntraVENous Q2H PRN Merry Bettencourt MD   2 mg at 09/12/23 2250    midodrine (PROAMATINE) tablet 10 mg  10 mg Oral TID  Merry Bettencourt MD   10 mg at 09/14/23 0805    fentaNYL (SUBLIMAZE) injection 12.5 mcg  12.5 mcg IntraVENous Q1H PRN Merry Bettencourt MD   12.5 mcg at 09/13/23 1542    amiodarone (CORDARONE) tablet 200 mg  200 mg Oral TID Synetta Duane, APRN - CNP   200 mg at 09/14/23 6202    [Held by provider] metoprolol tartrate (LOPRESSOR) tablet 12.5 mg  12.5 mg Oral BID Navneet Noemy DO   12.5 mg at 09/13/23 0732    lacosamide (VIMPAT) 75 mg in sodium chloride 0.9 % 57.5 mL IVPB  75 mg IntraVENous BID HAILEY Young   Stopped at 09/13/23 2056    perflutren lipid microspheres (DEFINITY) injection 1.5 mL  1.5 mL IntraVENous ONCE PRN MARKOS Abernathy CNP        ferrous sulfate 300 MG/5ML solution 300 mg  300 mg Oral Daily with breakfast Shari Bowens MD   300 mg at 09/14/23 0805    levothyroxine (SYNTHROID) tablet 25 mcg  25 mcg Oral Daily Guillermina Gillette MD   25 mcg at 09/14/23 0551    insulin lispro (HUMALOG) injection vial 0-8 Units  0-8 Units SubCUTAneous TID  Guillermina Gillette MD   6 Units at 09/08/23 1640    potassium chloride 20 mEq/50 mL IVPB (Central Line)  20 mEq IntraVENous PRN Vivi Jain MD        [facility] by provider] fentaNYL (SUBLIMAZE) 1,000 mcg in sodium chloride 0.9% 100 mL infusion   mcg/hr IntraVENous Continuous Guillermina Gillette MD   Stopped at 09/08/23 0052    [Held by provider] diphenhydrAMINE (BENADRYL) injection 25 mg  25 mg IntraVENous Q6H PRN Guillermina Gillette MD   25 mg at 09/08/23 1453    ipratropium 0.5 mg-albuterol 2.5 mg (DUONEB) nebulizer solution 1 Dose  1 Dose Inhalation Q4H PRN Guillermina Gillette MD   1 Dose at 09/13/23 0949    chlorhexidine (PERIDEX) 0.12 % care     Diagnosis:  9/14/2023  CHF  EF 25%  Septic shock improved, currently off pressors. Possible source of sepsis bilateral pneumonia  S/p cardiac arrest 09/07 with ROSC of 2 minutes  CHF  Suspected anoxic encephalopathy  Bilateral lower lobe pneumonia with pleural effusion   Hypercapnic and hypoxic respiratory failure  A-fib RVR  Status post right ORIF with cephalomedullary nailing  Hyperkalemia  OSORIO with a decreasing urine output on CRRT  Shock liver improving  Coagulopathy due to shock liver and Eliquis use at home  RLL Questionable PE      Plan:  9/142023  Dobutrex + Levophed drip  Amiodarone  Vent support  Extremely poor prognosis  Palliative+ Hospice Cx placed  Unable to contact family  Neuro  : Intubated and following alert but NOT commands. Moving all extremities. Stephen Dayanara started by neurology. Neurology consult appreciated. Pulmonary : Bilateral lower lobe pneumonia noted. Intubated for hypoxic and hypercapnic     respiratory failure. Continues to be ventilator dependent with a PEEP of 5 and     FiO2 40%. Cardiovascular: Chf EF 25% Hold Lasix rising Cr to 1.8. Hold Dobutamine high BP. Off lidocaine drip for V. tach. EP start amiodarone today. Abdomen/GI :  NPO for now, N-acetylcysteine for shock liver. CT abdomen shows generalized        mesenteric edema without any mechanical obstruction. Cholelithiasis without any      obvious cholecystitis. Appreciate general surgery consult. Gallbladder is likely not      the source of infection. Renal   : Worsening OSORIO with reduced urine output, on CRRT  MSK   : No active issues   Skin   : No active issues   Hematology  : No active issues  ID   : Empirical meropenem and vancomycin started. All cultures no growth so far. Endocrine  : Monitor BS  DVT/GI  : Prophylaxis: Protonix and SCD  Code Status  : DNR CCA. Discussed in details about poor prognosis with cousin Germán Castillo. They decided for DNR CCA.   Disposition  : billed separately is not included. Total CRITICAL CARE TIME caring for this patient, including direct patient contact, management of life support systems, review of data including imaging and labs, discussions with other team members and physicians is at least greater than 45 minutes of critical care time spent so far today, excluding procedures. More than 50% of my  time was spent at the bedside counseling/coordinating care with the patient and/or family with face to face contact. This time was spent reviewing notes and laboratory data as well as instructing and counseling the patient. Time I spent with the family or surrogate(s) is included only if the patient was incapable of providing the necessary information or participating in medical decisions. I also discussed the differential diagnosis and all of the proposed management plans with the patient and or individuals accompanying the patient. Rosalba Jorge M.D, [address] [place] [address] and [facility] at [address]  9/14/2023   9:13 AM    Disclaimer: This report, in part or full, may have been transcribed using voice recognition software. Every effort was made to ensure accuracy; however, inadvertent computerized transcription errors may be present. Please excuse any transcriptional grammatical   or spelling errors that may have escaped my editorial review.       Electronically signed by Corby Garg MD on 9/14/2023 at 9:13 AM  [phone]

## 2023-09-14 NOTE — ANESTHESIA PROCEDURE NOTES
Airway  Date/Time: 9/13/2023 10:00 PM  Urgency: emergent    Difficult airway    General Information and Staff    Patient location during procedure: ICU  Anesthesiologist: Lissett Chou DO  Resident/CRNA: MARKOS Reynoso CRNA  Other anesthesia staff: Viri Islas RN  Performed: other anesthesia staff   Performed by: Viri Islas RN  Authorized by: Gris Cyr MD      Consent for Airway (if performed for an anesthetic, see related documentation for consents)  Patient identity confirmed: per hospital policy  Consent: No emergent situation. Verbal consent obtained. Written consent not obtained. Risks and benefits: risks, benefits and alternatives were discussed  Consent given by: patient      Code status verified:yes  Indications and Patient Condition  Indications for airway management: airway protection and respiratory distress  Spontaneous ventilation: present  Sedation level: deep (Etomidate and Sux)  Preoxygenated: yes  Patient position: sniffing  MILS maintained throughout  Mask difficulty assessment: vent by bag mask    Final Airway Details  Final airway type: endotracheal airway      Successful airway: ETT  Cuffed: yes   Successful intubation technique: video laryngoscopy  Facilitating devices/methods: intubating stylet  Endotracheal tube insertion site: oral  Blade type: CMAC.   Blade size: #4  ETT size (mm): 8.0  Cormack-Lehane Classification: grade IIa - partial view of glottis  Placement verified by: chest auscultation and capnometry   Measured from: lips  ETT to lips (cm): 23  Number of attempts at approach: 1  Ventilation between attempts: bag mask  Number of other approaches attempted: 0

## 2023-09-14 NOTE — PROGRESS NOTES
Called to patients room, patient self extubated herself. Patient was placed on a NRB 15LPM%. SPO2 100%. Patient stating she is having a hard time breathing, RR high 30s to mid 40s at times. Dr. Guy Siegel was called. Order to intubate patient. Anesthesia was called to intubated. Patient intubated with a 8.0 ET at 23 cm at the lip. Equal bilateral breath sounds on ausculation, end tidal was 40. Patient placed back on the ventilator of settings that are ordered.

## 2023-09-15 VITALS
SYSTOLIC BLOOD PRESSURE: 54 MMHG | WEIGHT: 148.81 LBS | BODY MASS INDEX: 28.1 KG/M2 | OXYGEN SATURATION: 94 % | HEIGHT: 61 IN | TEMPERATURE: 98.4 F | DIASTOLIC BLOOD PRESSURE: 29 MMHG

## 2023-09-15 LAB
EKG ATRIAL RATE: 183 BPM
EKG P AXIS: 54 DEGREES
EKG P-R INTERVAL: 120 MS
EKG Q-T INTERVAL: 242 MS
EKG QRS DURATION: 56 MS
EKG QTC CALCULATION (BAZETT): 422 MS
EKG R AXIS: 120 DEGREES
EKG T AXIS: 75 DEGREES
EKG VENTRICULAR RATE: 183 BPM

## 2023-09-15 NOTE — PROGRESS NOTES
notified of patient death.  stated patient can be placed in the morgue and can be released to a  home.  will sign death certificate.

## 2023-09-15 NOTE — DISCHARGE SUMMARY
UPPER EXTREMITY LEFT VENOUS    Result Date: 9/11/2023  No evidence of DVT. Superficial vein thrombosis involving the medial cubital and cephalic veins. XR CHEST PORTABLE    Result Date: 9/11/2023  1. Cardiomegaly and pleural effusions 2. Unchanged basilar atelectasis versus pneumonia greater on the left. 3.  Stable support devices. XR CHEST PORTABLE    Result Date: 9/10/2023  1. Stable support apparatus and postsurgical changes. 2. Pleural and parenchymal opacities in the lung bases greater on the left 3. Low lung volumes. XR CHEST PORTABLE    Result Date: 9/9/2023  1. The position and alignment of the tubes and catheters appear within normal range 2. Moderate bilateral pleural effusions and adjacent atelectasis, stable 3. No signs of pneumothorax. CTA CHEST W CONTRAST    Result Date: 9/8/2023  1. Moderate pleural effusions with associated atelectasis. 2. Questionable small nonocclusive emboli in the right lower lobe. No large or central pulmonary embolus. 3. Cardiomegaly and possible right heart dysfunction. 4. Emphysema. 5. Mild ascites and possible chronic liver disease. 6. Body wall edema. CTA HEAD W CONTRAST    Result Date: 9/8/2023  1. Stable CT head. No acute intracranial hemorrhage or edema. 2. No intracranial arterial occlusion. 3. No hemodynamically significant stenosis involving the internal carotid arteries or vertebral arteries. CTA NECK W CONTRAST    Result Date: 9/8/2023  1. Stable CT head. No acute intracranial hemorrhage or edema. 2. No intracranial arterial occlusion. 3. No hemodynamically significant stenosis involving the internal carotid arteries or vertebral arteries. XR CHEST PORTABLE    Result Date: 9/8/2023  1. Right IJ venous catheter at the cavoatrial junction/high right atrium area. Otherwise stable support apparatus. 2. Small bilateral pleural effusions with associated atelectasis/consolidation.  3.  Nonspecific interstitial prominence, chronic changes versus component of edema or atypical infection. 4. Low lung volumes. 5. Otherwise stable. US GALLBLADDER RUQ    Result Date: 9/8/2023  Significant gallbladder wall thickening/gallbladder wall edema at 17.2 L with cholelithiasis and sludge. Normal common bile duct. Findings highly worrisome for cholecystitis. CT ABDOMEN PELVIS WO CONTRAST Additional Contrast? None    Result Date: 9/8/2023  Diffuse edema including anasarca appearance and small to moderate right as well as small left pleural effusions. Mesenteric edema with small to moderate abdominopelvic ascites. Limited evaluation of solid organs due to lack of intravenous contrast without mechanical obstructive process of bowel or obstructing uropathy. Distended gallbladder with cholelithiasis present difficult to assess biliary tree however periportal edema adjacent of an overall edematous appearance     CT HEAD WO CONTRAST    Result Date: 9/8/2023  No acute intracranial abnormality.         [unfilled]    Discharge Medications    Discharge Medication List as of 9/14/2023  9:49 PM        Discharge Medication List as of 9/14/2023  9:49 PM        Discharge Medication List as of 9/14/2023  9:49 PM    CONTINUE these medications which have NOT CHANGED    tiotropium-olodaterol (STIOLTO RESPIMAT) 2.5-2.5 MCG/ACT AERS  Inhale 2 puffs into the lungs daily, Disp-1 each, R-2  Normal    metoprolol succinate (TOPROL XL) 25 MG extended release tablet  Take 1 tablet by mouth daily, Disp-30 tablet, R-3  Normal    bumetanide (BUMEX) 1 MG tablet  Take 1 tablet by mouth daily, Disp-30 tablet, R-3  Normal    spironolactone (ALDACTONE) 25 MG tablet  Take 0.5 tablets by mouth daily, Disp-30 tablet, R-3  Normal    vitamin D3 (CHOLECALCIFEROL) 125 MCG (5000 UT) TABS tablet  Take 1 tablet by mouth daily, Disp-30 tablet, R-0  Normal    bisacodyl (DULCOLAX) 5 MG EC tablet  Take 1 tablet by mouth daily as needed for Constipation, Disp-30 tablet, R-0  Normal    rosuvastatin

## 2023-09-15 NOTE — PROGRESS NOTES
Patient valuables given to [employer] police. See police documents. All other personal items sent with patient to more inside body bag.

## 2023-11-27 NOTE — PLAN OF CARE
FYI - A1c improving quite well. No changes made today. If blood sugar still slightly high at next visit could consider adding jardiance 10 mg daily (added renal protection).   Charly Problem: Discharge Planning  Goal: Discharge to home or other facility with appropriate resources  Outcome: Progressing     Problem: Pain  Goal: Verbalizes/displays adequate comfort level or baseline comfort level  Outcome: Progressing     Problem: Safety - Adult  Goal: Free from fall injury  Outcome: Progressing

## (undated) DEVICE — BIT DRL L500MM DIA6X9MM CANN STP L QUIK CPL FOR DH DC TFN

## (undated) DEVICE — SCREW BNE L34MM DIA5MM TIB LT GRN TI ST CANN LOK FULL THRD
Type: IMPLANTABLE DEVICE | Site: HIP | Status: NON-FUNCTIONAL
Removed: 2023-09-06

## (undated) DEVICE — SOLUTION IRRIG 1000ML STRL H2O USP PLAS POUR BTL

## (undated) DEVICE — BIT DRL L L266MM DIA16MM FEM FLX CANN QUIK CPL

## (undated) DEVICE — SOLUTION IRRIG 1000ML 0.9% SOD CHL USP POUR PLAS BTL

## (undated) DEVICE — ROD RMR L950MM DIA2.5MM W/ EXTN BALL TIP

## (undated) DEVICE — ELECTRODE PT RET AD L9FT HI MOIST COND ADH HYDRGEL CORDED

## (undated) DEVICE — GLOVE ORTHO 8   MSG9480

## (undated) DEVICE — 6619 2 PTNT ISO SYS INCISE AREA&LT;(&GT;&&LT;)&GT;P: Brand: STERI-DRAPE™ IOBAN™ 2

## (undated) DEVICE — FRACTURE TABLE: Brand: MEDLINE INDUSTRIES, INC.

## (undated) DEVICE — GUIDEWIRE ORTH L400MM DIA3.2MM FOR TFN

## (undated) DEVICE — GLOVE ORANGE PI 8   MSG9080

## (undated) DEVICE — BIT DRL L145MM DIA4.2MM ST 3 FLUT NDL PNT QUIK CPL FOR FEM